# Patient Record
Sex: FEMALE | Race: WHITE | URBAN - METROPOLITAN AREA
[De-identification: names, ages, dates, MRNs, and addresses within clinical notes are randomized per-mention and may not be internally consistent; named-entity substitution may affect disease eponyms.]

---

## 2022-11-01 ENCOUNTER — OFFICE (OUTPATIENT)
Dept: URBAN - METROPOLITAN AREA CLINIC 109 | Facility: CLINIC | Age: 82
Setting detail: OPHTHALMOLOGY
End: 2022-11-01
Payer: MEDICARE

## 2022-11-01 DIAGNOSIS — H40.1132: ICD-10-CM

## 2022-11-01 PROCEDURE — 92133 CPTRZD OPH DX IMG PST SGM ON: CPT | Performed by: OPHTHALMOLOGY

## 2022-11-01 PROCEDURE — 92020 GONIOSCOPY: CPT | Performed by: OPHTHALMOLOGY

## 2022-11-01 PROCEDURE — 92083 EXTENDED VISUAL FIELD XM: CPT | Performed by: OPHTHALMOLOGY

## 2022-11-01 PROCEDURE — 99213 OFFICE O/P EST LOW 20 MIN: CPT | Performed by: OPHTHALMOLOGY

## 2022-11-01 ASSESSMENT — REFRACTION_AUTOREFRACTION
OS_CYLINDER: -1.25
OS_SPHERE: +0.50
OD_CYLINDER: -1.50
OD_SPHERE: +0.75
OS_AXIS: 70
OD_AXIS: 105

## 2022-11-01 ASSESSMENT — TONOMETRY
OS_IOP_MMHG: 15
OD_IOP_MMHG: 15

## 2022-11-01 ASSESSMENT — SUPERFICIAL PUNCTATE KERATITIS (SPK)
OS_SPK: 2+
OD_SPK: 2+

## 2022-11-01 ASSESSMENT — SPHEQUIV_DERIVED
OD_SPHEQUIV: 0
OS_SPHEQUIV: -0.125

## 2022-11-01 ASSESSMENT — VISUAL ACUITY
OD_BCVA: 20/20-2
OS_BCVA: 20/20-2

## 2022-11-01 ASSESSMENT — CONFRONTATIONAL VISUAL FIELD TEST (CVF)
OD_FINDINGS: FULL
OS_FINDINGS: FULL

## 2023-05-10 ENCOUNTER — OFFICE (OUTPATIENT)
Dept: URBAN - METROPOLITAN AREA CLINIC 109 | Facility: CLINIC | Age: 83
Setting detail: OPHTHALMOLOGY
End: 2023-05-10
Payer: MEDICARE

## 2023-05-10 DIAGNOSIS — H40.1132: ICD-10-CM

## 2023-05-10 DIAGNOSIS — H43.393: ICD-10-CM

## 2023-05-10 PROCEDURE — 92083 EXTENDED VISUAL FIELD XM: CPT | Performed by: OPHTHALMOLOGY

## 2023-05-10 PROCEDURE — 92014 COMPRE OPH EXAM EST PT 1/>: CPT | Performed by: OPHTHALMOLOGY

## 2023-05-10 PROCEDURE — 92250 FUNDUS PHOTOGRAPHY W/I&R: CPT | Performed by: OPHTHALMOLOGY

## 2023-05-10 ASSESSMENT — REFRACTION_AUTOREFRACTION
OD_SPHERE: +0.75
OS_SPHERE: +0.50
OD_CYLINDER: -1.50
OS_AXIS: 70
OD_AXIS: 105
OS_CYLINDER: -1.25

## 2023-05-10 ASSESSMENT — SUPERFICIAL PUNCTATE KERATITIS (SPK)
OS_SPK: 2+
OD_SPK: 2+

## 2023-05-10 ASSESSMENT — CONFRONTATIONAL VISUAL FIELD TEST (CVF)
OD_FINDINGS: FULL
OS_FINDINGS: FULL

## 2023-05-10 ASSESSMENT — SPHEQUIV_DERIVED
OS_SPHEQUIV: -0.125
OD_SPHEQUIV: 0

## 2023-05-10 ASSESSMENT — VISUAL ACUITY
OD_BCVA: 20/20-2
OS_BCVA: 20/20-2

## 2023-11-14 ENCOUNTER — OFFICE (OUTPATIENT)
Dept: URBAN - METROPOLITAN AREA CLINIC 109 | Facility: CLINIC | Age: 83
Setting detail: OPHTHALMOLOGY
End: 2023-11-14
Payer: MEDICARE

## 2023-11-14 DIAGNOSIS — H40.1132: ICD-10-CM

## 2023-11-14 PROCEDURE — 92012 INTRM OPH EXAM EST PATIENT: CPT | Performed by: OPHTHALMOLOGY

## 2023-11-14 PROCEDURE — 92083 EXTENDED VISUAL FIELD XM: CPT | Performed by: OPHTHALMOLOGY

## 2023-11-14 PROCEDURE — 92133 CPTRZD OPH DX IMG PST SGM ON: CPT | Performed by: OPHTHALMOLOGY

## 2023-11-14 PROCEDURE — 92020 GONIOSCOPY: CPT | Performed by: OPHTHALMOLOGY

## 2023-11-14 ASSESSMENT — REFRACTION_AUTOREFRACTION
OD_CYLINDER: -1.50
OD_SPHERE: +0.75
OS_CYLINDER: -1.25
OD_AXIS: 105
OS_SPHERE: +0.50
OS_AXIS: 70

## 2023-11-14 ASSESSMENT — SPHEQUIV_DERIVED
OD_SPHEQUIV: 0
OS_SPHEQUIV: -0.125

## 2023-11-14 ASSESSMENT — CONFRONTATIONAL VISUAL FIELD TEST (CVF)
OD_FINDINGS: FULL
OS_FINDINGS: FULL

## 2023-11-14 ASSESSMENT — SUPERFICIAL PUNCTATE KERATITIS (SPK)
OS_SPK: 2+
OD_SPK: 2+

## 2024-03-21 ENCOUNTER — INPATIENT (INPATIENT)
Facility: HOSPITAL | Age: 84
LOS: 1 days | Discharge: HOME | DRG: 305 | End: 2024-03-23
Attending: INTERNAL MEDICINE | Admitting: INTERNAL MEDICINE
Payer: MEDICARE

## 2024-03-21 VITALS
WEIGHT: 134.92 LBS | DIASTOLIC BLOOD PRESSURE: 102 MMHG | RESPIRATION RATE: 18 BRPM | HEIGHT: 65 IN | TEMPERATURE: 98 F | SYSTOLIC BLOOD PRESSURE: 208 MMHG | HEART RATE: 86 BPM | OXYGEN SATURATION: 96 %

## 2024-03-21 DIAGNOSIS — I10 ESSENTIAL (PRIMARY) HYPERTENSION: ICD-10-CM

## 2024-03-21 LAB
ALBUMIN SERPL ELPH-MCNC: 4 G/DL — SIGNIFICANT CHANGE UP (ref 3.3–5)
ALP SERPL-CCNC: 93 U/L — SIGNIFICANT CHANGE UP (ref 40–120)
ALT FLD-CCNC: 54 U/L — SIGNIFICANT CHANGE UP (ref 12–78)
ANION GAP SERPL CALC-SCNC: 9 MMOL/L — SIGNIFICANT CHANGE UP (ref 5–17)
AST SERPL-CCNC: 30 U/L — SIGNIFICANT CHANGE UP (ref 15–37)
BASOPHILS # BLD AUTO: 0.07 K/UL — SIGNIFICANT CHANGE UP (ref 0–0.2)
BASOPHILS NFR BLD AUTO: 0.8 % — SIGNIFICANT CHANGE UP (ref 0–2)
BILIRUB SERPL-MCNC: 1.4 MG/DL — HIGH (ref 0.2–1.2)
BUN SERPL-MCNC: 11 MG/DL — SIGNIFICANT CHANGE UP (ref 7–23)
CALCIUM SERPL-MCNC: 9 MG/DL — SIGNIFICANT CHANGE UP (ref 8.5–10.1)
CHLORIDE SERPL-SCNC: 108 MMOL/L — SIGNIFICANT CHANGE UP (ref 96–108)
CO2 SERPL-SCNC: 26 MMOL/L — SIGNIFICANT CHANGE UP (ref 22–31)
CREAT SERPL-MCNC: 0.75 MG/DL — SIGNIFICANT CHANGE UP (ref 0.5–1.3)
EGFR: 78 ML/MIN/1.73M2 — SIGNIFICANT CHANGE UP
EOSINOPHIL # BLD AUTO: 0.12 K/UL — SIGNIFICANT CHANGE UP (ref 0–0.5)
EOSINOPHIL NFR BLD AUTO: 1.3 % — SIGNIFICANT CHANGE UP (ref 0–6)
GLUCOSE SERPL-MCNC: 95 MG/DL — SIGNIFICANT CHANGE UP (ref 70–99)
HCT VFR BLD CALC: 37.2 % — SIGNIFICANT CHANGE UP (ref 34.5–45)
HGB BLD-MCNC: 12.1 G/DL — SIGNIFICANT CHANGE UP (ref 11.5–15.5)
IMM GRANULOCYTES NFR BLD AUTO: 0.4 % — SIGNIFICANT CHANGE UP (ref 0–0.9)
LYMPHOCYTES # BLD AUTO: 1.7 K/UL — SIGNIFICANT CHANGE UP (ref 1–3.3)
LYMPHOCYTES # BLD AUTO: 18.7 % — SIGNIFICANT CHANGE UP (ref 13–44)
MAGNESIUM SERPL-MCNC: 2 MG/DL — SIGNIFICANT CHANGE UP (ref 1.6–2.6)
MCHC RBC-ENTMCNC: 30 PG — SIGNIFICANT CHANGE UP (ref 27–34)
MCHC RBC-ENTMCNC: 32.5 GM/DL — SIGNIFICANT CHANGE UP (ref 32–36)
MCV RBC AUTO: 92.3 FL — SIGNIFICANT CHANGE UP (ref 80–100)
MONOCYTES # BLD AUTO: 0.92 K/UL — HIGH (ref 0–0.9)
MONOCYTES NFR BLD AUTO: 10.1 % — SIGNIFICANT CHANGE UP (ref 2–14)
NEUTROPHILS # BLD AUTO: 6.23 K/UL — SIGNIFICANT CHANGE UP (ref 1.8–7.4)
NEUTROPHILS NFR BLD AUTO: 68.7 % — SIGNIFICANT CHANGE UP (ref 43–77)
NRBC # BLD: 0 /100 WBCS — SIGNIFICANT CHANGE UP (ref 0–0)
PLATELET # BLD AUTO: 320 K/UL — SIGNIFICANT CHANGE UP (ref 150–400)
POTASSIUM SERPL-MCNC: 3.5 MMOL/L — SIGNIFICANT CHANGE UP (ref 3.5–5.3)
POTASSIUM SERPL-SCNC: 3.5 MMOL/L — SIGNIFICANT CHANGE UP (ref 3.5–5.3)
PROT SERPL-MCNC: 7.5 G/DL — SIGNIFICANT CHANGE UP (ref 6–8.3)
RBC # BLD: 4.03 M/UL — SIGNIFICANT CHANGE UP (ref 3.8–5.2)
RBC # FLD: 14.2 % — SIGNIFICANT CHANGE UP (ref 10.3–14.5)
SODIUM SERPL-SCNC: 143 MMOL/L — SIGNIFICANT CHANGE UP (ref 135–145)
TROPONIN I, HIGH SENSITIVITY RESULT: 6.6 NG/L — SIGNIFICANT CHANGE UP
TSH SERPL-MCNC: 0.69 UIU/ML — SIGNIFICANT CHANGE UP (ref 0.36–3.74)
WBC # BLD: 9.08 K/UL — SIGNIFICANT CHANGE UP (ref 3.8–10.5)
WBC # FLD AUTO: 9.08 K/UL — SIGNIFICANT CHANGE UP (ref 3.8–10.5)

## 2024-03-21 PROCEDURE — 93010 ELECTROCARDIOGRAM REPORT: CPT

## 2024-03-21 PROCEDURE — 71045 X-RAY EXAM CHEST 1 VIEW: CPT | Mod: 26

## 2024-03-21 PROCEDURE — 99285 EMERGENCY DEPT VISIT HI MDM: CPT

## 2024-03-21 RX ORDER — OLMESARTAN MEDOXOMIL 5 MG/1
1 TABLET, FILM COATED ORAL
Qty: 30 | Refills: 0
Start: 2024-03-21 | End: 2024-04-19

## 2024-03-21 RX ORDER — METOPROLOL TARTRATE 50 MG
5 TABLET ORAL EVERY 6 HOURS
Refills: 0 | Status: DISCONTINUED | OUTPATIENT
Start: 2024-03-21 | End: 2024-03-22

## 2024-03-21 RX ORDER — HYDRALAZINE HCL 50 MG
10 TABLET ORAL ONCE
Refills: 0 | Status: COMPLETED | OUTPATIENT
Start: 2024-03-21 | End: 2024-03-21

## 2024-03-21 RX ORDER — ACETAMINOPHEN 500 MG
650 TABLET ORAL EVERY 6 HOURS
Refills: 0 | Status: DISCONTINUED | OUTPATIENT
Start: 2024-03-21 | End: 2024-03-23

## 2024-03-21 RX ORDER — METOPROLOL TARTRATE 50 MG
25 TABLET ORAL
Refills: 0 | Status: DISCONTINUED | OUTPATIENT
Start: 2024-03-21 | End: 2024-03-22

## 2024-03-21 RX ORDER — SODIUM CHLORIDE 9 MG/ML
1000 INJECTION INTRAMUSCULAR; INTRAVENOUS; SUBCUTANEOUS ONCE
Refills: 0 | Status: COMPLETED | OUTPATIENT
Start: 2024-03-21 | End: 2024-03-21

## 2024-03-21 RX ORDER — LOSARTAN POTASSIUM 100 MG/1
25 TABLET, FILM COATED ORAL DAILY
Refills: 0 | Status: DISCONTINUED | OUTPATIENT
Start: 2024-03-21 | End: 2024-03-23

## 2024-03-21 RX ORDER — METOPROLOL TARTRATE 50 MG
5 TABLET ORAL ONCE
Refills: 0 | Status: COMPLETED | OUTPATIENT
Start: 2024-03-21 | End: 2024-03-21

## 2024-03-21 RX ADMIN — SODIUM CHLORIDE 1000 MILLILITER(S): 9 INJECTION INTRAMUSCULAR; INTRAVENOUS; SUBCUTANEOUS at 20:11

## 2024-03-21 RX ADMIN — Medication 5 MILLIGRAM(S): at 19:55

## 2024-03-21 RX ADMIN — SODIUM CHLORIDE 1000 MILLILITER(S): 9 INJECTION INTRAMUSCULAR; INTRAVENOUS; SUBCUTANEOUS at 21:11

## 2024-03-21 RX ADMIN — Medication 10 MILLIGRAM(S): at 17:59

## 2024-03-21 NOTE — H&P ADULT - ASSESSMENT
84yo F ho htn, hld, hypothyroid, presents with elevated BP. pt has not had her coreg in a week, she ran out. also lost her  in january and had been taking care of him and was unable to care of herself. was seeing her pcp today and noted to have elevated BP so was sent to cardiologist. saw dr miranda and was found ot have /105. pt was given a 5 mg amlodipine and shanon dto go to ED for more aggressive management of her BP. she denies headache, chest pain, sob, vision changes leg swelling or other complaints. pt just feeling anxious  82yo F ho htn, hld, hypothyroid, presents with elevated BP. pt has not had her coreg in a week, she ran out. also lost her  in january and had been taking care of him and was unable to care of herself. was seeing her pcp today and noted to have elevated BP so was sent to cardiologist. saw dr miranda and was found ot have /105. pt was given a 5 mg amlodipine and shanon dto go to ED for more aggressive management of her BP. she denies headache, chest pain, sob, vision changes leg swelling or other complaints. pt just feeling anxious    1 HTN urgency  - resolved  - cw ACE  - cw bb  - cards fu   - DASH diet    2 Afib with RVR  - cw metoprolol  - cards fu     3 HLD  - cw statin    DVT prophylaxis

## 2024-03-21 NOTE — PATIENT PROFILE ADULT - FALL HARM RISK - HARM RISK INTERVENTIONS

## 2024-03-21 NOTE — ED PROVIDER NOTE - CLINICAL SUMMARY MEDICAL DECISION MAKING FREE TEXT BOX
84yo F ho htn, hld, hypothyroid, presents with elevated BP. pt has not had her coreg in a week, she ran out. also lost her  in january and had been taking care of him and was unable to care of herself. was seeing her pcp today and noted to have elevated BP so was sent to cardiologist. saw dr miranda and was found ot have /105. pt was given a 5 mg amlodipine and shanon dto go to ED for more aggressive management of her BP. she denies headache, chest pain, sob, vision changes leg swelling or other complaints. pt just feeling anxious   asymptomatic htn, I nsetting of missing coreg for 1 week, sent in by crds for iv antihypertensive and then add amlodipine and switch ace>arb   will check basic labs, reassess after hydralazine

## 2024-03-21 NOTE — ED PROVIDER NOTE - PROGRESS NOTE DETAILS
BP now 130/71, will dc with olmesartan 20mg QD, already got script for coreg and amlodipine and will fu with dr miranda  pt reassessed and found to be in rapid afib, will treat and keep for am cards marieal given iv metoprolol with response for afib, luz maria hinton

## 2024-03-21 NOTE — H&P ADULT - NSHPLABSRESULTS_GEN_ALL_CORE
Lab Results:  CBC  CBC Full  -  ( 21 Mar 2024 17:55 )  WBC Count : 9.08 K/uL  RBC Count : 4.03 M/uL  Hemoglobin : 12.1 g/dL  Hematocrit : 37.2 %  Platelet Count - Automated : 320 K/uL  Mean Cell Volume : 92.3 fl  Mean Cell Hemoglobin : 30.0 pg  Mean Cell Hemoglobin Concentration : 32.5 gm/dL  Auto Neutrophil # : 6.23 K/uL  Auto Lymphocyte # : 1.70 K/uL  Auto Monocyte # : 0.92 K/uL  Auto Eosinophil # : 0.12 K/uL  Auto Basophil # : 0.07 K/uL  Auto Neutrophil % : 68.7 %  Auto Lymphocyte % : 18.7 %  Auto Monocyte % : 10.1 %  Auto Eosinophil % : 1.3 %  Auto Basophil % : 0.8 %    .		Differential:	[] Automated		[] Manual  Chemistry                        12.1   9.08  )-----------( 320      ( 21 Mar 2024 17:55 )             37.2     03-21    143  |  108  |  11  ----------------------------<  95  3.5   |  26  |  0.75    Ca    9.0      21 Mar 2024 17:55  Mg     2.0     03-21    TPro  7.5  /  Alb  4.0  /  TBili  1.4<H>  /  DBili  x   /  AST  30  /  ALT  54  /  AlkPhos  93  03-21    LIVER FUNCTIONS - ( 21 Mar 2024 17:55 )  Alb: 4.0 g/dL / Pro: 7.5 g/dL / ALK PHOS: 93 U/L / ALT: 54 U/L / AST: 30 U/L / GGT: x             Urinalysis Basic - ( 21 Mar 2024 17:55 )    Color: x / Appearance: x / SG: x / pH: x  Gluc: 95 mg/dL / Ketone: x  / Bili: x / Urobili: x   Blood: x / Protein: x / Nitrite: x   Leuk Esterase: x / RBC: x / WBC x   Sq Epi: x / Non Sq Epi: x / Bacteria: x            MICROBIOLOGY/CULTURES:      RADIOLOGY RESULTS: reviewed

## 2024-03-21 NOTE — ED ADULT NURSE NOTE - OBJECTIVE STATEMENT
Pt presents to the ED, sent from her MD's office s/p elevated BP. EKG done, pt placed on cardiac monitor.

## 2024-03-21 NOTE — H&P ADULT - HISTORY OF PRESENT ILLNESS
84yo F ho htn, hld, hypothyroid, presents with elevated BP. pt has not had her coreg in a week, she ran out. also lost her  in january and had been taking care of him and was unable to care of herself. was seeing her pcp today and noted to have elevated BP so was sent to cardiologist. saw dr miranda and was found ot have /105. pt was given a 5 mg amlodipine and shanon dto go to ED for more aggressive management of her BP. she denies headache, chest pain, sob, vision changes leg swelling or other complaints. pt just feeling anxious

## 2024-03-21 NOTE — H&P ADULT - NSHPPHYSICALEXAM_GEN_ALL_CORE
General: WN/WD NAD  PERRLA  Neurology: A&Ox3, nonfocal, CRANDALL x 4  Respiratory: CTA B/L  CV: RRR, S1S2, no murmurs, rubs or gallops  Abdominal: Soft, NT, ND +BS, Last BM  Extremities: No edema, + peripheral pulses  Skin Normal

## 2024-03-21 NOTE — ED ADULT TRIAGE NOTE - CHIEF COMPLAINT QUOTE
patient ambulatory to triage a&ox4 with c/o high BP sent from cardiologist. denies chest pain/headaches/changes in vision. takes lisinopril and amlodipine took her medications today

## 2024-03-21 NOTE — H&P ADULT - NSICDXPASTMEDICALHX_GEN_ALL_CORE_FT
"Oncology Rooming Note    April 27, 2022 12:08 PM   Josefina Bennett is a 54 year old female who presents for:    Chief Complaint   Patient presents with     Blood Draw     Labs drawn with  by rn.  VS taken.     Oncology Clinic Visit     Rtn Breast CA; Follow Up     Initial Vitals: BP (!) 153/85 (BP Location: Left arm, Patient Position: Sitting, Cuff Size: Adult Regular)   Pulse 102   Temp 98.2  F (36.8  C) (Oral)   Resp 16   Wt 61.2 kg (135 lb)   LMP 12/18/2014   SpO2 99%   BMI 23.17 kg/m   Estimated body mass index is 23.17 kg/m  as calculated from the following:    Height as of 4/7/22: 1.626 m (5' 4\").    Weight as of this encounter: 61.2 kg (135 lb). Body surface area is 1.66 meters squared.  Moderate Pain (5) Comment: Data Unavailable   Patient's last menstrual period was 12/18/2014.  Allergies reviewed: Yes  Medications reviewed: Yes    Medications: Medication refills not needed today.  Pharmacy name entered into Spo[facility]:    [facility] - [place], [place] - [address] [id number] - [place], [place] - [address]  [facility] - [place], [place] - [address] [facility] - [place], [place] - [address]    Clinical concerns: Pt has no concerns for their provider on April 27, 2022 and would like to discuss the original chief complaint of the appointment.     Kallie Colon, EMT on April 27, 2022 at 12:08 PM            " PAST MEDICAL HISTORY:  HLD (hyperlipidemia)     HTN (hypertension)

## 2024-03-22 LAB
BASE EXCESS BLDV CALC-SCNC: -0.2 MMOL/L — SIGNIFICANT CHANGE UP (ref -2–3)
HCO3 BLDV-SCNC: 25 MMOL/L — SIGNIFICANT CHANGE UP (ref 22–29)
PCO2 BLDV: 41 MMHG — SIGNIFICANT CHANGE UP (ref 39–42)
PH BLDV: 7.39 — SIGNIFICANT CHANGE UP (ref 7.32–7.43)
PO2 BLDV: 104 MMHG — HIGH (ref 25–45)
SAO2 % BLDV: 98.7 % — HIGH (ref 67–88)
T3 SERPL-MCNC: 86 NG/DL — SIGNIFICANT CHANGE UP (ref 80–200)
T4 AB SER-ACNC: 9 UG/DL — SIGNIFICANT CHANGE UP (ref 4.6–12)
TSH SERPL-MCNC: 0.8 UIU/ML — SIGNIFICANT CHANGE UP (ref 0.36–3.74)

## 2024-03-22 PROCEDURE — 93010 ELECTROCARDIOGRAM REPORT: CPT

## 2024-03-22 RX ORDER — LANOLIN ALCOHOL/MO/W.PET/CERES
5 CREAM (GRAM) TOPICAL ONCE
Refills: 0 | Status: COMPLETED | OUTPATIENT
Start: 2024-03-22 | End: 2024-03-22

## 2024-03-22 RX ORDER — ENOXAPARIN SODIUM 100 MG/ML
40 INJECTION SUBCUTANEOUS EVERY 24 HOURS
Refills: 0 | Status: DISCONTINUED | OUTPATIENT
Start: 2024-03-22 | End: 2024-03-23

## 2024-03-22 RX ORDER — LEVOTHYROXINE SODIUM 125 MCG
75 TABLET ORAL DAILY
Refills: 0 | Status: DISCONTINUED | OUTPATIENT
Start: 2024-03-22 | End: 2024-03-23

## 2024-03-22 RX ORDER — ATORVASTATIN CALCIUM 80 MG/1
10 TABLET, FILM COATED ORAL AT BEDTIME
Refills: 0 | Status: DISCONTINUED | OUTPATIENT
Start: 2024-03-22 | End: 2024-03-23

## 2024-03-22 RX ORDER — METOPROLOL TARTRATE 50 MG
50 TABLET ORAL
Refills: 0 | Status: DISCONTINUED | OUTPATIENT
Start: 2024-03-22 | End: 2024-03-23

## 2024-03-22 RX ADMIN — ATORVASTATIN CALCIUM 10 MILLIGRAM(S): 80 TABLET, FILM COATED ORAL at 21:16

## 2024-03-22 RX ADMIN — Medication 25 MILLIGRAM(S): at 05:56

## 2024-03-22 RX ADMIN — ENOXAPARIN SODIUM 40 MILLIGRAM(S): 100 INJECTION SUBCUTANEOUS at 13:56

## 2024-03-22 RX ADMIN — Medication 50 MILLIGRAM(S): at 13:56

## 2024-03-22 RX ADMIN — LOSARTAN POTASSIUM 25 MILLIGRAM(S): 100 TABLET, FILM COATED ORAL at 05:56

## 2024-03-22 NOTE — DIETITIAN INITIAL EVALUATION ADULT - ETIOLOGY
related to social circumstances in the setting of caring for spouse/reduced self care related to not taking BP med, higher than recommended intake of Na

## 2024-03-22 NOTE — DIETITIAN INITIAL EVALUATION ADULT - ORAL INTAKE PTA/DIET HISTORY
Pt reports tries to follow low salt diet at home- doesn't use table salt much, not much in cooking and tries to read labels.  Diet hx reveals Na intake higher than pt thinks as she eats regular bologna, canned soup, Rice-a-Fabien side dish, etc.  Eats out for dinner about 1x/wk with neice. Eats 3 meals a day at home.  Reports wt of 160# 3-4 years ago.  +gradual wt loss due to caring for ill spouse (passed in January) and going up and down split level home stairs multiple times a day to care for him.

## 2024-03-22 NOTE — SOCIAL WORK PROGRESS NOTE - NSSWPROGRESSNOTE_GEN_ALL_CORE
SW consult for positive depression screen marked complete. CHARLEY met with pt. bedside to discuss feelings of depressin. Pt. explained that she has had significant loss since losing her daughter in 2020 to covid19, her son recently due to strokes and  in january. SW and pt. discussed her previous experience in therapy and that she is managing her losses with the support of family and friends support. SW provided support and will remain available as needed.

## 2024-03-22 NOTE — CONSULT NOTE ADULT - SUBJECTIVE AND OBJECTIVE BOX
[facility]    CHIEF COMPLAINT: Patient is a 83y old  Female who presents with a chief complaint of High blood pressure (22 Mar 2024 09:45)      HPI:   84yo F ho htn, hld, hypothyroid, presents with elevated BP. pt has not had her coreg in a week, she ran out. also lost her  in january and had been taking care of him and was unable to care of herself. was seeing her pcp today and noted to have elevated BP so was sent to cardiologist. saw dr galicia and was found ot have /105. pt was given a 5 mg amlodipine and shanon dto go to ED for more aggressive management of her BP. she denies headache, chest pain, sob, vision changes leg swelling or other complaints. pt just feeling anxious (21 Mar 2024 20:45)    PAST MEDICAL & SURGICAL HISTORY:  HTN (hypertension)      HLD (hyperlipidemia)        SOCIAL HISTORY: no tobacco  FAMILY HISTORY:   HTN  MEDICATIONS  (STANDING):  atorvastatin 10 milliGRAM(s) Oral at bedtime  enoxaparin Injectable 40 milliGRAM(s) SubCutaneous every 24 hours  levothyroxine 75 MICROGram(s) Oral daily  losartan 25 milliGRAM(s) Oral daily  metoprolol tartrate 25 milliGRAM(s) Oral two times a day    MEDICATIONS  (PRN):  acetaminophen     Tablet .. 650 milliGRAM(s) Oral every 6 hours PRN Temp greater or equal to 38C (100.4F), Mild Pain (1 - 3)  metoprolol tartrate Injectable 5 milliGRAM(s) IV Push every 6 hours PRN for HR more than 100    Allergies    No Known Allergies    Intolerances        REVIEW OF SYSTEMS:  CONSTITUTIONAL: No weakness, no fevers   EYES/ENT: No visual changes  NECK: No pain or stiffness  RESPIRATORY: No shortness of breath  CARDIOVASCULAR: No chest pain or palpitations  GASTROINTESTINAL: No abdominal pain  GENITOURINARY: No hematuria  NEUROLOGICAL: No weakness  SKIN: No rash  All other review of systems is negative unless indicated above    VITAL SIGNS:   Vital Signs Last 24 Hrs  T(C): 37.1 (22 Mar 2024 04:45), Max: 37.3 (21 Mar 2024 23:16)  T(F): 98.7 (22 Mar 2024 04:45), Max: 99.2 (21 Mar 2024 23:16)  HR: 80 (22 Mar 2024 04:45) (68 - 116)  BP: 158/73 (22 Mar 2024 04:45) (130/71 - 213/97)  BP(mean): --  RR: 18 (22 Mar 2024 04:45) (16 - 18)  SpO2: 93% (22 Mar 2024 04:45) (93% - 98%)    Parameters below as of 22 Mar 2024 04:45  Patient On (Oxygen Delivery Method): room air      I&O's Summary    PHYSICAL EXAM:  Constitutional: NAD  Neurological: Alert and oriented  HEENT: EOMI, no JVD  Cardiovascular: S1 and S2, no murmur  Pulmonary: breath sounds bilaterally  Gastrointestinal: Bowel Sounds present, soft, nontender  Ext: no peripheral edema  Skin: No rashes, No cyanosis.  Psych:  Mood calm    LABS: All Labs Reviewed:                        12.1   9.08  )-----------( 320      ( 21 Mar 2024 17:55 )             37.2     03-21    143  |  108  |  11  ----------------------------<  95  3.5   |  26  |  0.75    Ca    9.0      21 Mar 2024 17:55  Mg     2.0     03-21    TPro  7.5  /  Alb  4.0  /  TBili  1.4<H>  /  DBili  x   /  AST  30  /  ALT  54  /  AlkPhos  93  03-21        HPI:   84yo F ho htn, hld, hypothyroid, presents with elevated BP. pt has not had her coreg in a week, she ran out. also lost her  in january and had been taking care of him and was unable to care of herself. was seeing her pcp today and noted to have elevated BP so was sent to cardiologist. saw dr galicia and was found ot have /105. pt was given a 5 mg amlodipine and shanon dto go to ED for more aggressive management of her BP. she denies headache, chest pain, sob, vision changes leg swelling or other complaints. pt just feeling anxious (21 Mar 2024 20:45)    pt denies CP or SOB  12 lead ekg and ECHO ordered  would inc losartan to 50 mg  will follow here  outpt f/u with Dr GALICIA [phone]          
Date/Time Patient Seen:  		  Referring MD:   Data Reviewed	       Patient is a 83y old  Female who presents with a chief complaint of High blood pressure (21 Mar 2024 20:45)      Subjective/HPI   82yo F ho htn, hld, hypothyroid, presents with elevated BP. pt has not had her coreg in a week, she ran out. also lost her  in january and had been taking care of him and was unable to care of herself. was seeing her pcp today and noted to have elevated BP so was sent to cardiologist. saw dr miranda and was found ot have /105.  PAST MEDICAL & SURGICAL HISTORY:  HTN (hypertension)    HLD (hyperlipidemia)    PAST MEDICAL HISTORY:  HLD (hyperlipidemia)     HTN (hypertension).     Social History:  · Substance use	No   · Social History (marital status, living situation, occupation, and sexual history)	neg     Tobacco Screening:  · Core Measure Site	Yes  · Has the patient used tobacco in the past 30 days?	No     Risk Assessment:    Present on Admission:  Deep Venous Thrombosis	no   Pulmonary Embolus	no      HIV Screening:  · In accordance with [place] State law, we offer every patient who comes to our ED an HIV test. Would you like to be tested today?	Opt out         Medication list         MEDICATIONS  (STANDING):  atorvastatin 10 milliGRAM(s) Oral at bedtime  enoxaparin Injectable 40 milliGRAM(s) SubCutaneous every 24 hours  levothyroxine 75 MICROGram(s) Oral daily  losartan 25 milliGRAM(s) Oral daily  metoprolol tartrate 25 milliGRAM(s) Oral two times a day    MEDICATIONS  (PRN):  acetaminophen     Tablet .. 650 milliGRAM(s) Oral every 6 hours PRN Temp greater or equal to 38C (100.4F), Mild Pain (1 - 3)  metoprolol tartrate Injectable 5 milliGRAM(s) IV Push every 6 hours PRN for HR more than 100         Vitals log        ICU Vital Signs Last 24 Hrs  T(C): 37.1 (22 Mar 2024 04:45), Max: 37.3 (21 Mar 2024 23:16)  T(F): 98.7 (22 Mar 2024 04:45), Max: 99.2 (21 Mar 2024 23:16)  HR: 80 (22 Mar 2024 04:45) (68 - 116)  BP: 158/73 (22 Mar 2024 04:45) (130/71 - 213/97)  BP(mean): --  ABP: --  ABP(mean): --  RR: 18 (22 Mar 2024 04:45) (16 - 18)  SpO2: 93% (22 Mar 2024 04:45) (93% - 98%)    O2 Parameters below as of 22 Mar 2024 04:45  Patient On (Oxygen Delivery Method): room air                 Input and Output:  I&O's Detail      Lab Data                        12.1   9.08  )-----------( 320      ( 21 Mar 2024 17:55 )             37.2     03-21    143  |  108  |  11  ----------------------------<  95  3.5   |  26  |  0.75    Ca    9.0      21 Mar 2024 17:55  Mg     2.0     03-21    TPro  7.5  /  Alb  4.0  /  TBili  1.4<H>  /  DBili  x   /  AST  30  /  ALT  54  /  AlkPhos  93  03-21            Review of Systems	    depressed mood  flat affect    Objective     Physical Examination    heart s1s2  lung dc BS  head nc  verbal  alert  cn grossly int  kyphosis      Pertinent Lab findings & Imaging      Krishna:  NO   Adequate UO     I&O's Detail           Discussed with:     Cultures:	        Radiology  cxr noted  report pending

## 2024-03-22 NOTE — DIETITIAN INITIAL EVALUATION ADULT - PERTINENT MEDS FT
MEDICATIONS  (STANDING):  atorvastatin 10 milliGRAM(s) Oral at bedtime  enoxaparin Injectable 40 milliGRAM(s) SubCutaneous every 24 hours  levothyroxine 75 MICROGram(s) Oral daily  losartan 25 milliGRAM(s) Oral daily  metoprolol tartrate 25 milliGRAM(s) Oral two times a day    MEDICATIONS  (PRN):  acetaminophen     Tablet .. 650 milliGRAM(s) Oral every 6 hours PRN Temp greater or equal to 38C (100.4F), Mild Pain (1 - 3)  metoprolol tartrate Injectable 5 milliGRAM(s) IV Push every 6 hours PRN for HR more than 100

## 2024-03-22 NOTE — DIETITIAN INITIAL EVALUATION ADULT - OTHER INFO
"84yo F ho htn, hld, hypothyroid, presents with elevated BP" admitted for hypertensive urgency, Afib.

## 2024-03-22 NOTE — PROGRESS NOTE ADULT - ASSESSMENT
82yo F ho htn, hld, hypothyroid, presents with elevated BP. pt has not had her coreg in a week, she ran out. also lost her  in january and had been taking care of him and was unable to care of herself. was seeing her pcp today and noted to have elevated BP so was sent to cardiologist. saw dr miranda and was found ot have /105. pt was given a 5 mg amlodipine and shanon dto go to ED for more aggressive management of her BP. she denies headache, chest pain, sob, vision changes leg swelling or other complaints. pt just feeling anxious    1 HTN urgency  - resolved  - cw ACE  - cw bb  - cards fu   - DASH diet    2 Afib with RVR  - unclear as per cards  - telemonitor x 24 hrs   - cw metoprolol  - cards fu     3 HLD  - cw statin    DVT prophylaxis

## 2024-03-22 NOTE — CARE COORDINATION ASSESSMENT. - NSCAREPROVIDERS_GEN_ALL_CORE_FT
CARE PROVIDERS:  Accepting Physician: Liliane Ewing  Administration: Issac Jones  Administration: Beatris Allen  Administration: Cesilia England  Admitting: Liliane Ewing  Attending: Liliane Ewing  Clinical Doc. Improvement: Navneet Kennedy  Consultant: Klever Burnham  Consultant: Corby Seo  ED Attending: Deanna Don  ED Nurse: Marilyn Chau  ED Nurse 2: Justin Cast  Nurse: Flor Mason  Nurse: Beatris Aguilar  Nurse: Tirso Ledezma  Nurse: Lizbet Alonzo  Ordered: ADM, User  Override: Rubia Hines  Override: Norma Kim  Override: Tirso Ledezma  PCA/Nursing Assistant: Nik Ordonez  Registered Dietitian: Sharon Alicia  Registered Dietitian: Manjula Waldron  : Clotilde Chavarria

## 2024-03-22 NOTE — PROVIDER CONTACT NOTE (OTHER) - SITUATION
Informed provider pt /67 HR 74 93%RA 99.2 T
Informed provider pt /75 and HR 76. Informed provider pt complained of mild headache

## 2024-03-22 NOTE — CARE COORDINATION ASSESSMENT. - OTHER PERTINENT DISCHARGE PLANNING INFORMATION:
CM met with the patient at the bedside, educated pt on role of CM and transition planning. Patient verbalized understanding. CM provided direct contact/resource folder and remains available. Pt resides in a house alone, has 3 steps to enter, 7 to upstairs, 7 to basement. Patient has 2 walkers, 3 wheelchairs, but does not use. Per Pt she is independent with ADL's, ambulating and negotiating steps and still drives. Denies any prior home care services. PMD Dr Sangeeta Swain, pharmacy [facility].

## 2024-03-22 NOTE — DIETITIAN INITIAL EVALUATION ADULT - PERTINENT LABORATORY DATA
03-21    143  |  108  |  11  ----------------------------<  95  3.5   |  26  |  0.75    Ca    9.0      21 Mar 2024 17:55  Mg     2.0     03-21    TPro  7.5  /  Alb  4.0  /  TBili  1.4<H>  /  DBili  x   /  AST  30  /  ALT  54  /  AlkPhos  93  03-21

## 2024-03-22 NOTE — CONSULT NOTE ADULT - ASSESSMENT
84yo F ho htn, hld, hypothyroid, presents with elevated BP. pt has not had her coreg in a week, she ran out. also lost her  in january and had been taking care of him and was unable to care of herself. was seeing her pcp today and noted to have elevated BP so was sent to cardiologist. saw dr miranda and was found ot have /105.    HTN  HLD  Kyphosis  Grieving  Hypothyroidism    cardio eval  cvs rx regimen optimization and BP control  dietary discretion  kyphosis on exam - assessment for restrictive lung disease - non urgent PFT, will check VBG  monitor VS and HD and Sat  seasonal vaccination discussion  emotional support - grieving

## 2024-03-23 ENCOUNTER — TRANSCRIPTION ENCOUNTER (OUTPATIENT)
Age: 84
End: 2024-03-23

## 2024-03-23 ENCOUNTER — RESULT REVIEW (OUTPATIENT)
Age: 84
End: 2024-03-23

## 2024-03-23 VITALS
SYSTOLIC BLOOD PRESSURE: 158 MMHG | HEART RATE: 60 BPM | RESPIRATION RATE: 18 BRPM | OXYGEN SATURATION: 94 % | TEMPERATURE: 99 F | DIASTOLIC BLOOD PRESSURE: 82 MMHG

## 2024-03-23 LAB
CHOLEST SERPL-MCNC: 145 MG/DL — SIGNIFICANT CHANGE UP
HDLC SERPL-MCNC: 71 MG/DL — SIGNIFICANT CHANGE UP
LIDOCAIN IGE QN: 72 U/L — SIGNIFICANT CHANGE UP (ref 13–75)
LIPID PNL WITH DIRECT LDL SERPL: 64 MG/DL — SIGNIFICANT CHANGE UP
NON HDL CHOLESTEROL: 74 MG/DL — SIGNIFICANT CHANGE UP
TRIGL SERPL-MCNC: 41 MG/DL — SIGNIFICANT CHANGE UP

## 2024-03-23 PROCEDURE — 84480 ASSAY TRIIODOTHYRONINE (T3): CPT

## 2024-03-23 PROCEDURE — 85025 COMPLETE CBC W/AUTO DIFF WBC: CPT

## 2024-03-23 PROCEDURE — 80053 COMPREHEN METABOLIC PANEL: CPT

## 2024-03-23 PROCEDURE — 82803 BLOOD GASES ANY COMBINATION: CPT

## 2024-03-23 PROCEDURE — 96374 THER/PROPH/DIAG INJ IV PUSH: CPT

## 2024-03-23 PROCEDURE — 93306 TTE W/DOPPLER COMPLETE: CPT

## 2024-03-23 PROCEDURE — 83690 ASSAY OF LIPASE: CPT

## 2024-03-23 PROCEDURE — 84484 ASSAY OF TROPONIN QUANT: CPT

## 2024-03-23 PROCEDURE — 99285 EMERGENCY DEPT VISIT HI MDM: CPT

## 2024-03-23 PROCEDURE — 83735 ASSAY OF MAGNESIUM: CPT

## 2024-03-23 PROCEDURE — 36415 COLL VENOUS BLD VENIPUNCTURE: CPT

## 2024-03-23 PROCEDURE — 93005 ELECTROCARDIOGRAM TRACING: CPT

## 2024-03-23 PROCEDURE — 84436 ASSAY OF TOTAL THYROXINE: CPT

## 2024-03-23 PROCEDURE — 71045 X-RAY EXAM CHEST 1 VIEW: CPT

## 2024-03-23 PROCEDURE — 84443 ASSAY THYROID STIM HORMONE: CPT

## 2024-03-23 PROCEDURE — 80061 LIPID PANEL: CPT

## 2024-03-23 PROCEDURE — 96375 TX/PRO/DX INJ NEW DRUG ADDON: CPT

## 2024-03-23 RX ORDER — LISINOPRIL 2.5 MG/1
1 TABLET ORAL
Refills: 0 | DISCHARGE

## 2024-03-23 RX ORDER — LOSARTAN POTASSIUM 100 MG/1
1 TABLET, FILM COATED ORAL
Qty: 30 | Refills: 0
Start: 2024-03-23 | End: 2024-04-21

## 2024-03-23 RX ORDER — LOSARTAN POTASSIUM 100 MG/1
50 TABLET, FILM COATED ORAL DAILY
Refills: 0 | Status: DISCONTINUED | OUTPATIENT
Start: 2024-03-23 | End: 2024-03-23

## 2024-03-23 RX ADMIN — Medication 5 MILLIGRAM(S): at 01:33

## 2024-03-23 RX ADMIN — LOSARTAN POTASSIUM 25 MILLIGRAM(S): 100 TABLET, FILM COATED ORAL at 06:25

## 2024-03-23 RX ADMIN — Medication 75 MICROGRAM(S): at 06:25

## 2024-03-23 RX ADMIN — Medication 50 MILLIGRAM(S): at 06:25

## 2024-03-23 NOTE — DISCHARGE NOTE PROVIDER - HOSPITAL COURSE
84yo F ho htn, hld, hypothyroid, presents with elevated BP. pt has not had her coreg in a week, she ran out. also lost her  in january and had been taking care of him and was unable to care of herself. was seeing her pcp today and noted to have elevated BP so was sent to cardiologist. saw dr miranda and was found ot have /105. pt was given a 5 mg amlodipine and shanon dto go to ED for more aggressive management of her BP. she denies headache, chest pain, sob, vision changes leg swelling or other complaints. pt just feeling anxious    1 HTN urgency  - resolved  - increase losartan to  50   - cw bb  - cards fu   - DASH diet    2 Afib with RVR  - ruled out  - dw cards likley artifact, pt has been in normal sinus  - cw metoprolol  - cards fu     3 HLD  - cw statin    DVT prophylaxis

## 2024-03-23 NOTE — DISCHARGE NOTE PROVIDER - NSDCCPCAREPLAN_GEN_ALL_CORE_FT
PRINCIPAL DISCHARGE DIAGNOSIS  Diagnosis: Hypertensive urgency  Assessment and Plan of Treatment: increased losartan to 50 daily  resolved  outpt fu withh cards      SECONDARY DISCHARGE DIAGNOSES  Diagnosis: Atrial fibrillation  Assessment and Plan of Treatment: ruled out  dw cards, pt has been normal sinus

## 2024-03-23 NOTE — PROGRESS NOTE ADULT - ASSESSMENT
84yo F ho htn, hld, hypothyroid, presents with elevated BP. pt has not had her coreg in a week, she ran out. also lost her  in january and had been taking care of him and was unable to care of herself. was seeing her pcp today and noted to have elevated BP so was sent to cardiologist. saw dr galicia and was found ot have /105. pt was given a 5 mg amlodipine and shanon dto go to ED for more aggressive management of her BP. she denies headache, chest pain, sob, vision changes leg swelling or other complaints. pt just feeling anxious (21 Mar 2024 20:45)    pt denies CP or SOB  12 lead ekg and ECHO ordered  would inc losartan to 50 mg  will follow here  outpt f/u with Dr GALICIA [phone]  82yo F ho htn, hld, hypothyroid, presents with elevated BP. pt has not had her coreg in a week, she ran out. also lost her  in january and had been taking care of him and was unable to care of herself. was seeing her pcp today and noted to have elevated BP so was sent to cardiologist. saw dr galicia and was found ot have /105. pt was given a 5 mg amlodipine and shanon dto go to ED for more aggressive management of her BP. she denies headache, chest pain, sob, vision changes leg swelling or other complaints. pt just feeling anxious (21 Mar 2024 20:45)    Sinus rhythm on monitor without events overnight  12 lead ekg and ECHO ordered  would inc losartan to 50 mg  will follow here  outpt f/u with Dr GALICIA [phone]

## 2024-03-23 NOTE — DISCHARGE NOTE NURSING/CASE MANAGEMENT/SOCIAL WORK - PATIENT PORTAL LINK FT
You can access the FollowMyHealth Patient Portal offered by [facility] by registering at the following website: [email or web address]. By joining [facility]’s FollowMyHealth portal, you will also be able to view your health information using other applications (apps) compatible with our system.

## 2024-03-23 NOTE — PROGRESS NOTE ADULT - ASSESSMENT
84yo F ho htn, hld, hypothyroid, presents with elevated BP. pt has not had her coreg in a week, she ran out. also lost her  in january and had been taking care of him and was unable to care of herself. was seeing her pcp today and noted to have elevated BP so was sent to cardiologist. saw dr miranda and was found ot have /105.    HTN  HLD  Kyphosis  Grieving  Hypothyroidism    TFT and VBG noted  vs noted  cardio eval noted    cardio eval  cvs rx regimen optimization and BP control  dietary discretion  kyphosis on exam - assessment for restrictive lung disease - non urgent PFT,   monitor VS and HD and Sat  seasonal vaccination discussion  emotional support - grieving

## 2024-03-23 NOTE — DISCHARGE NOTE PROVIDER - NSDCMRMEDTOKEN_GEN_ALL_CORE_FT
anastrozole 1 mg oral tablet: 1 tab(s) orally once a day  carvedilol 80 mg oral capsule, extended release: 1 cap(s) orally once a day  latanoprost 0.005% ophthalmic solution: 1 drop(s) in each eye 2 times a day  losartan 50 mg oral tablet: 1 tab(s) orally once a day  pravastatin 40 mg oral tablet: 1 tab(s) orally once a day  Synthroid 75 mcg (0.075 mg) oral tablet: 1 tab(s) orally once a day (at bedtime)

## 2024-03-23 NOTE — DISCHARGE NOTE PROVIDER - NSCORESITESY/N_GEN_A_CORE_RD
You can access the FollowMyHealth Patient Portal offered by [facility] by registering at the following website: [email or web address]. By joining [email or web address]’s FollowMyHealth portal, you will also be able to view your health information using other applications (apps) compatible with our system. Yes

## 2024-03-23 NOTE — DISCHARGE NOTE PROVIDER - CARE PROVIDER_API CALL
Umberto Frias  Cardiovascular Disease  [address] [place] [address], [place] [address]  Phone: [phone]  Fax: [phone]  Follow Up Time:

## 2024-03-23 NOTE — PROGRESS NOTE ADULT - SUBJECTIVE AND OBJECTIVE BOX
Date/Time Patient Seen:  		  Referring MD:   Data Reviewed	       Patient is a 83y old  Female who presents with a chief complaint of High blood pressure (22 Mar 2024 13:29)      Subjective/HPI     PAST MEDICAL & SURGICAL HISTORY:  HTN (hypertension)    HLD (hyperlipidemia)          Medication list         MEDICATIONS  (STANDING):  atorvastatin 10 milliGRAM(s) Oral at bedtime  enoxaparin Injectable 40 milliGRAM(s) SubCutaneous every 24 hours  levothyroxine 75 MICROGram(s) Oral daily  losartan 25 milliGRAM(s) Oral daily  metoprolol tartrate 50 milliGRAM(s) Oral two times a day    MEDICATIONS  (PRN):  acetaminophen     Tablet .. 650 milliGRAM(s) Oral every 6 hours PRN Temp greater or equal to 38C (100.4F), Mild Pain (1 - 3)         Vitals log        ICU Vital Signs Last 24 Hrs  T(C): 36.7 (23 Mar 2024 04:43), Max: 36.8 (22 Mar 2024 13:00)  T(F): 98.1 (23 Mar 2024 04:43), Max: 98.3 (22 Mar 2024 20:33)  HR: 63 (23 Mar 2024 04:43) (63 - 91)  BP: 163/76 (23 Mar 2024 04:43) (150/77 - 177/80)  BP(mean): --  ABP: --  ABP(mean): --  RR: 18 (23 Mar 2024 04:43) (18 - 18)  SpO2: 97% (23 Mar 2024 04:43) (95% - 97%)    O2 Parameters below as of 23 Mar 2024 04:43  Patient On (Oxygen Delivery Method): room air                 Input and Output:  I&O's Detail      Lab Data                        12.1   9.08  )-----------( 320      ( 21 Mar 2024 17:55 )             37.2     03-21    143  |  108  |  11  ----------------------------<  95  3.5   |  26  |  0.75    Ca    9.0      21 Mar 2024 17:55  Mg     2.0     03-21    TPro  7.5  /  Alb  4.0  /  TBili  1.4<H>  /  DBili  x   /  AST  30  /  ALT  54  /  AlkPhos  93  03-21            Review of Systems	      Objective     Physical Examination  heart s1s2  lung dc BS  head nc        Pertinent Lab findings & Imaging      Keller:  NO   Adequate UO     I&O's Detail           Discussed with:     Cultures:	        Radiology                            
[facility] Cardiology Progress Note [phone] (Dr. Frias, Can, Gabbi, Susan)    CHIEF COMPLAINT: Patient is a 83y old  Female who presents with a chief complaint of High blood pressure (23 Mar 2024 05:27)      Follow Up Today: The patient denies any chest discomfort or shortness of breath.    HPI:   84yo F ho htn, hld, hypothyroid, presents with elevated BP. pt has not had her coreg in a week, she ran out. also lost her  in january and had been taking care of him and was unable to care of herself. was seeing her pcp today and noted to have elevated BP so was sent to cardiologist. saw dr miranda and was found ot have /105. pt was given a 5 mg amlodipine and shanon dto go to ED for more aggressive management of her BP. she denies headache, chest pain, sob, vision changes leg swelling or other complaints. pt just feeling anxious (21 Mar 2024 20:45)      PAST MEDICAL & SURGICAL HISTORY:  HTN (hypertension)      HLD (hyperlipidemia)          MEDICATIONS  (STANDING):  atorvastatin 10 milliGRAM(s) Oral at bedtime  enoxaparin Injectable 40 milliGRAM(s) SubCutaneous every 24 hours  levothyroxine 75 MICROGram(s) Oral daily  losartan 25 milliGRAM(s) Oral daily  metoprolol tartrate 50 milliGRAM(s) Oral two times a day    MEDICATIONS  (PRN):  acetaminophen     Tablet .. 650 milliGRAM(s) Oral every 6 hours PRN Temp greater or equal to 38C (100.4F), Mild Pain (1 - 3)      Allergies    No Known Allergies    Intolerances        REVIEW OF SYSTEMS:    All other review of systems is negative unless indicated above    Vital Signs Last 24 Hrs  T(C): 36.7 (23 Mar 2024 04:43), Max: 36.8 (22 Mar 2024 13:00)  T(F): 98.1 (23 Mar 2024 04:43), Max: 98.3 (22 Mar 2024 20:33)  HR: 63 (23 Mar 2024 04:43) (63 - 91)  BP: 163/76 (23 Mar 2024 04:43) (150/77 - 177/80)  BP(mean): --  RR: 18 (23 Mar 2024 04:43) (18 - 18)  SpO2: 97% (23 Mar 2024 04:43) (95% - 97%)    Parameters below as of 23 Mar 2024 04:43  Patient On (Oxygen Delivery Method): room air        I&O's Summary      PHYSICAL EXAM:    Constitutional: NAD, awake and alert, well-developed  Eyes:  EOMI,  Pupils round, No oral cyanosis.  HEENT: No exudate or erythema  Pulmonary: Non-labored, breath sounds are clear bilaterally, No wheezing, rales or rhonchi  Cardiovascular: Regular, S1 and S2, No murmurs  Gastrointestinal: Bowel Sounds present, soft, nontender.   Ext: No significant LE edema  Neurological: Alert, no gross focal motor deficits  Skin: No rashes.  Psych:  Mood & affect appropriate    LABS: All Labs Reviewed:                        12.1   9.08  )-----------( 320      ( 21 Mar 2024 17:55 )             37.2     21 Mar 2024 17:55    143    |  108    |  11     ----------------------------<  95     3.5     |  26     |  0.75     Ca    9.0        21 Mar 2024 17:55  Mg     2.0       21 Mar 2024 17:55    TPro  7.5    /  Alb  4.0    /  TBili  1.4    /  DBili  x      /  AST  30     /  ALT  54     /  AlkPhos  93     21 Mar 2024 17:55          Blood Culture:     03-22 @ 10:00  TSH: 0.80  03-21 @ 17:55  TSH: 0.69      RADIOLOGY/EKG:    Attending Attestation:   50 minutes spent on total encounter; more than 50% of the visit was spent counseling and/or coordinating care by the attending physician.     ASSESSMENT AND PLAN
Patient is a 83y old  Female who presents with a chief complaint of Primary hypertension     (22 Mar 2024 10:58)    Date of servie : 03-22-24 @ 13:29  INTERVAL HPI/OVERNIGHT EVENTS:  T(C): 36.8 (03-22-24 @ 13:00), Max: 37.3 (03-21-24 @ 23:16)  HR: 91 (03-22-24 @ 13:00) (68 - 116)  BP: 177/80 (03-22-24 @ 13:00) (130/71 - 213/97)  RR: 18 (03-22-24 @ 13:00) (16 - 18)  SpO2: 93% (03-22-24 @ 04:45) (93% - 98%)  Wt(kg): --  I&O's Summary      LABS:                        12.1   9.08  )-----------( 320      ( 21 Mar 2024 17:55 )             37.2     03-21    143  |  108  |  11  ----------------------------<  95  3.5   |  26  |  0.75    Ca    9.0      21 Mar 2024 17:55  Mg     2.0     03-21    TPro  7.5  /  Alb  4.0  /  TBili  1.4<H>  /  DBili  x   /  AST  30  /  ALT  54  /  AlkPhos  93  03-21      Urinalysis Basic - ( 21 Mar 2024 17:55 )    Color: x / Appearance: x / SG: x / pH: x  Gluc: 95 mg/dL / Ketone: x  / Bili: x / Urobili: x   Blood: x / Protein: x / Nitrite: x   Leuk Esterase: x / RBC: x / WBC x   Sq Epi: x / Non Sq Epi: x / Bacteria: x      CAPILLARY BLOOD GLUCOSE            Urinalysis Basic - ( 21 Mar 2024 17:55 )    Color: x / Appearance: x / SG: x / pH: x  Gluc: 95 mg/dL / Ketone: x  / Bili: x / Urobili: x   Blood: x / Protein: x / Nitrite: x   Leuk Esterase: x / RBC: x / WBC x   Sq Epi: x / Non Sq Epi: x / Bacteria: x        MEDICATIONS  (STANDING):  atorvastatin 10 milliGRAM(s) Oral at bedtime  enoxaparin Injectable 40 milliGRAM(s) SubCutaneous every 24 hours  levothyroxine 75 MICROGram(s) Oral daily  losartan 25 milliGRAM(s) Oral daily  metoprolol tartrate 25 milliGRAM(s) Oral two times a day    MEDICATIONS  (PRN):  acetaminophen     Tablet .. 650 milliGRAM(s) Oral every 6 hours PRN Temp greater or equal to 38C (100.4F), Mild Pain (1 - 3)  metoprolol tartrate Injectable 5 milliGRAM(s) IV Push every 6 hours PRN for HR more than 100          PHYSICAL EXAM:  GENERAL: NAD, well-groomed, well-developed  HEAD:  Atraumatic, Normocephalic  CHEST/LUNG: Clear to percussion bilaterally; No rales, rhonchi, wheezing, or rubs  HEART: Regular rate and rhythm; No murmurs, rubs, or gallops  ABDOMEN: Soft, Nontender, Nondistended; Bowel sounds present  EXTREMITIES:  2+ Peripheral Pulses, No clubbing, cyanosis, or edema  LYMPH: No lymphadenopathy noted  SKIN: No rashes or lesions    Care Discussed with Consultants/Other Providers [ ] YES  [ ] NO

## 2024-03-23 NOTE — CASE MANAGEMENT PROGRESS NOTE - NSCMPROGRESSNOTE_GEN_ALL_CORE
Per MD, patient is medically cleared for discharge home today.  CM met with patient to discussed discharge disposition home with no formal services (patient declined). Discharge notice signed and copy given to patient. Patient stated her daughter in law will transport her home. Patient verbalized understanding of the transition plan and is in agreement.  CM remains available throughout hospital stay.

## 2024-03-24 RX ORDER — CARVEDILOL PHOSPHATE 80 MG/1
1 CAPSULE, EXTENDED RELEASE ORAL
Refills: 0
Start: 2024-03-24

## 2024-03-24 RX ORDER — CARVEDILOL PHOSPHATE 80 MG/1
1 CAPSULE, EXTENDED RELEASE ORAL
Qty: 30 | Refills: 0
Start: 2024-03-24 | End: 2024-04-22

## 2024-03-25 RX ORDER — CARVEDILOL PHOSPHATE 80 MG/1
1 CAPSULE, EXTENDED RELEASE ORAL
Qty: 30 | Refills: 0
Start: 2024-03-25 | End: 2024-04-23

## 2024-03-25 RX ORDER — CARVEDILOL PHOSPHATE 80 MG/1
1 CAPSULE, EXTENDED RELEASE ORAL
Refills: 0 | DISCHARGE

## 2024-04-19 PROBLEM — E78.5 HYPERLIPIDEMIA, UNSPECIFIED: Chronic | Status: ACTIVE | Noted: 2024-03-21

## 2024-04-19 PROBLEM — I10 ESSENTIAL (PRIMARY) HYPERTENSION: Chronic | Status: ACTIVE | Noted: 2024-03-21

## 2024-04-25 ENCOUNTER — APPOINTMENT (OUTPATIENT)
Dept: ORTHOPEDIC SURGERY | Facility: CLINIC | Age: 84
End: 2024-04-25
Payer: MEDICARE

## 2024-04-25 VITALS — HEIGHT: 65 IN | WEIGHT: 135 LBS | BODY MASS INDEX: 22.49 KG/M2

## 2024-04-25 DIAGNOSIS — M43.16 SPONDYLOLISTHESIS, LUMBAR REGION: ICD-10-CM

## 2024-04-25 DIAGNOSIS — M70.62 TROCHANTERIC BURSITIS, LEFT HIP: ICD-10-CM

## 2024-04-25 DIAGNOSIS — M51.16 INTERVERTEBRAL DISC DISORDERS WITH RADICULOPATHY, LUMBAR REGION: ICD-10-CM

## 2024-04-25 DIAGNOSIS — I10 ESSENTIAL (PRIMARY) HYPERTENSION: ICD-10-CM

## 2024-04-25 DIAGNOSIS — M51.36 OTHER INTERVERTEBRAL DISC DEGENERATION, LUMBAR REGION: ICD-10-CM

## 2024-04-25 PROBLEM — Z00.00 ENCOUNTER FOR PREVENTIVE HEALTH EXAMINATION: Status: ACTIVE | Noted: 2024-04-25

## 2024-04-25 PROCEDURE — 20610 DRAIN/INJ JOINT/BURSA W/O US: CPT | Mod: LT

## 2024-04-25 PROCEDURE — 99204 OFFICE O/P NEW MOD 45 MIN: CPT | Mod: 25

## 2024-04-25 RX ORDER — CARVEDILOL PHOSPHATE 80 MG/1
80 CAPSULE, EXTENDED RELEASE ORAL
Refills: 0 | Status: ACTIVE | COMMUNITY

## 2024-04-25 RX ORDER — LEVOTHYROXINE SODIUM 75 UG/1
75 CAPSULE ORAL
Refills: 0 | Status: ACTIVE | COMMUNITY

## 2024-04-25 RX ORDER — IBUPROFEN 800 MG/1
800 TABLET, FILM COATED ORAL
Refills: 0 | Status: ACTIVE | COMMUNITY

## 2024-04-25 RX ORDER — LATANOPROST/PF 0.005 %
DROPS OPHTHALMIC (EYE)
Refills: 0 | Status: ACTIVE | COMMUNITY

## 2024-04-25 RX ORDER — FERROUS SULFATE 325(65) MG
TABLET ORAL
Refills: 0 | Status: ACTIVE | COMMUNITY

## 2024-04-25 RX ORDER — PRAVASTATIN SODIUM 40 MG/1
40 TABLET ORAL
Refills: 0 | Status: ACTIVE | COMMUNITY

## 2024-04-25 RX ORDER — ANASTROZOLE 1 MG/1
1 TABLET ORAL
Refills: 0 | Status: ACTIVE | COMMUNITY

## 2024-04-25 RX ORDER — METHYLPREDNISOLONE 4 MG/1
4 TABLET ORAL
Qty: 1 | Refills: 0 | Status: ACTIVE | COMMUNITY
Start: 2024-04-25 | End: 1900-01-01

## 2024-04-25 RX ORDER — IRBESARTAN 150 MG/1
150 TABLET ORAL
Refills: 0 | Status: ACTIVE | COMMUNITY

## 2024-04-25 NOTE — HISTORY OF PRESENT ILLNESS
[10] : 10 [Dull/Aching] : dull/aching [Constant] : constant [Household chores] : household chores [Leisure] : leisure [Sleep] : sleep [Meds] : meds [Walking] : walking [de-identified] : Has pain left hip, radiating down leg to ankle. Had a fall 3 months ago, seemed to start after that. Walks without ambulatory aide. Had recent xrays of back and hip, referred to office [] : no [FreeTextEntry1] : Left hip [FreeTextEntry3] : 12/27/23 [FreeTextEntry5] : Pain started 12/27/23, patient fell. Wasn't initially aware that she hurt her hip, but states pain radiates down her left leg. Pain got worse over time, had x-ray at [facility] [FreeTextEntry7] : Pain radiates down left leg to foot

## 2024-04-25 NOTE — DATA REVIEWED
[Lumbar Spine] : lumbar spine [Outside X-rays] : outside x-rays [Left] : left [Hip] : hip [I reviewed the films/CD and agree] : I reviewed the films/CD and agree [FreeTextEntry1] : ddd, spondylolisthesis L4/5 [FreeTextEntry2] : negative

## 2024-04-25 NOTE — PHYSICAL EXAM
[Absent] : achilles reflex absent [Left] : left hip [5___] : flexion 5[unfilled]/5 [] : no groin pain with resisted straight leg raise

## 2024-04-25 NOTE — PROCEDURE
[Large Joint Injection] : Large joint injection [Left] : of the left [Pain] : pain [Inflammation] : inflammation [X-ray evidence of Osteoarthritis on this or prior visit] : x-ray evidence of Osteoarthritis on this or prior visit [Alcohol] : alcohol [Betadine] : betadine [Ethyl Chloride sprayed topically] : ethyl chloride sprayed topically [Sterile technique used] : sterile technique used [___ cc    6mg] :  Betamethasone (Celestone) ~Vcc of 6mg [___ cc    0.5%] : Bupivacaine (Marcaine) ~Vcc of 0.5%  [] : Patient tolerated procedure well [Call if redness, pain or fever occur] : call if redness, pain or fever occur [Apply ice for 15min out of every hour for the next 12-24 hours as tolerated] : apply ice for 15 minutes out of every hour for the next 12-24 hours as tolerated [Patient was advised to rest the joint(s) for ____ days] : patient was advised to rest the joint(s) for [unfilled] days [Previous OTC use and PT nontherapeutic] : patient has tried OTC's including aspirin, Ibuprofen, Aleve, etc or prescription NSAIDS, and/or exercises at home and/or physical therapy without satisfactory response [Patient had decreased mobility in the joint] : patient had decreased mobility in the joint [Risks, benefits, alternatives discussed / Verbal consent obtained] : the risks benefits, and alternatives have been discussed, and verbal consent was obtained

## 2024-04-28 ENCOUNTER — INPATIENT (INPATIENT)
Facility: HOSPITAL | Age: 84
LOS: 14 days | Discharge: NURSING, REHAB OR LONG-TERM CARE | DRG: 66 | End: 2024-05-13
Attending: STUDENT IN AN ORGANIZED HEALTH CARE EDUCATION/TRAINING PROGRAM | Admitting: STUDENT IN AN ORGANIZED HEALTH CARE EDUCATION/TRAINING PROGRAM
Payer: MEDICARE

## 2024-04-28 ENCOUNTER — EMERGENCY (EMERGENCY)
Facility: HOSPITAL | Age: 84
LOS: 1 days | Discharge: ANOTHER HOSPITAL | End: 2024-04-28
Attending: STUDENT IN AN ORGANIZED HEALTH CARE EDUCATION/TRAINING PROGRAM | Admitting: STUDENT IN AN ORGANIZED HEALTH CARE EDUCATION/TRAINING PROGRAM
Payer: MEDICARE

## 2024-04-28 VITALS
HEIGHT: 65 IN | HEART RATE: 69 BPM | DIASTOLIC BLOOD PRESSURE: 63 MMHG | SYSTOLIC BLOOD PRESSURE: 128 MMHG | RESPIRATION RATE: 16 BRPM | TEMPERATURE: 98 F | OXYGEN SATURATION: 95 %

## 2024-04-28 VITALS
DIASTOLIC BLOOD PRESSURE: 62 MMHG | OXYGEN SATURATION: 96 % | TEMPERATURE: 98 F | RESPIRATION RATE: 16 BRPM | HEART RATE: 61 BPM | SYSTOLIC BLOOD PRESSURE: 137 MMHG

## 2024-04-28 VITALS
RESPIRATION RATE: 16 BRPM | HEIGHT: 65 IN | HEART RATE: 56 BPM | TEMPERATURE: 98 F | DIASTOLIC BLOOD PRESSURE: 102 MMHG | SYSTOLIC BLOOD PRESSURE: 214 MMHG | OXYGEN SATURATION: 96 %

## 2024-04-28 DIAGNOSIS — I61.5 NONTRAUMATIC INTRACEREBRAL HEMORRHAGE, INTRAVENTRICULAR: ICD-10-CM

## 2024-04-28 LAB
ALBUMIN SERPL ELPH-MCNC: 3.2 G/DL — LOW (ref 3.3–5)
ALBUMIN SERPL ELPH-MCNC: 3.8 G/DL — SIGNIFICANT CHANGE UP (ref 3.3–5.2)
ALP SERPL-CCNC: 62 U/L — SIGNIFICANT CHANGE UP (ref 40–120)
ALP SERPL-CCNC: 63 U/L — SIGNIFICANT CHANGE UP (ref 40–120)
ALT FLD-CCNC: 47 U/L — HIGH
ALT FLD-CCNC: 60 U/L — SIGNIFICANT CHANGE UP (ref 12–78)
ANION GAP SERPL CALC-SCNC: 13 MMOL/L — SIGNIFICANT CHANGE UP (ref 5–17)
ANION GAP SERPL CALC-SCNC: 7 MMOL/L — SIGNIFICANT CHANGE UP (ref 5–17)
APTT BLD: 30.3 SEC — SIGNIFICANT CHANGE UP (ref 24.5–35.6)
AST SERPL-CCNC: 30 U/L — SIGNIFICANT CHANGE UP
AST SERPL-CCNC: 43 U/L — HIGH (ref 15–37)
BASOPHILS # BLD AUTO: 0 K/UL — SIGNIFICANT CHANGE UP (ref 0–0.2)
BASOPHILS # BLD AUTO: 0.01 K/UL — SIGNIFICANT CHANGE UP (ref 0–0.2)
BASOPHILS NFR BLD AUTO: 0 % — SIGNIFICANT CHANGE UP (ref 0–2)
BASOPHILS NFR BLD AUTO: 0.1 % — SIGNIFICANT CHANGE UP (ref 0–2)
BILIRUB SERPL-MCNC: 0.9 MG/DL — SIGNIFICANT CHANGE UP (ref 0.4–2)
BILIRUB SERPL-MCNC: 1.1 MG/DL — SIGNIFICANT CHANGE UP (ref 0.2–1.2)
BLD GP AB SCN SERPL QL: SIGNIFICANT CHANGE UP
BUN SERPL-MCNC: 21 MG/DL — HIGH (ref 8–20)
BUN SERPL-MCNC: 21 MG/DL — SIGNIFICANT CHANGE UP (ref 7–23)
BURR CELLS BLD QL SMEAR: PRESENT — SIGNIFICANT CHANGE UP
CALCIUM SERPL-MCNC: 8 MG/DL — LOW (ref 8.5–10.1)
CALCIUM SERPL-MCNC: 8.4 MG/DL — SIGNIFICANT CHANGE UP (ref 8.4–10.5)
CHLORIDE SERPL-SCNC: 101 MMOL/L — SIGNIFICANT CHANGE UP (ref 96–108)
CHLORIDE SERPL-SCNC: 94 MMOL/L — LOW (ref 96–108)
CO2 SERPL-SCNC: 22 MMOL/L — SIGNIFICANT CHANGE UP (ref 22–29)
CO2 SERPL-SCNC: 25 MMOL/L — SIGNIFICANT CHANGE UP (ref 22–31)
CREAT SERPL-MCNC: 0.43 MG/DL — LOW (ref 0.5–1.3)
CREAT SERPL-MCNC: 0.61 MG/DL — SIGNIFICANT CHANGE UP (ref 0.5–1.3)
EGFR: 88 ML/MIN/1.73M2 — SIGNIFICANT CHANGE UP
EGFR: 95 ML/MIN/1.73M2 — SIGNIFICANT CHANGE UP
ELLIPTOCYTES BLD QL SMEAR: SLIGHT — SIGNIFICANT CHANGE UP
EOSINOPHIL # BLD AUTO: 0 K/UL — SIGNIFICANT CHANGE UP (ref 0–0.5)
EOSINOPHIL # BLD AUTO: 0 K/UL — SIGNIFICANT CHANGE UP (ref 0–0.5)
EOSINOPHIL NFR BLD AUTO: 0 % — SIGNIFICANT CHANGE UP (ref 0–6)
EOSINOPHIL NFR BLD AUTO: 0 % — SIGNIFICANT CHANGE UP (ref 0–6)
GLUCOSE SERPL-MCNC: 122 MG/DL — HIGH (ref 70–99)
GLUCOSE SERPL-MCNC: 139 MG/DL — HIGH (ref 70–99)
HCT VFR BLD CALC: 33.2 % — LOW (ref 34.5–45)
HCT VFR BLD CALC: 33.3 % — LOW (ref 34.5–45)
HGB BLD-MCNC: 11 G/DL — LOW (ref 11.5–15.5)
HGB BLD-MCNC: 11.3 G/DL — LOW (ref 11.5–15.5)
IMM GRANULOCYTES NFR BLD AUTO: 0.8 % — SIGNIFICANT CHANGE UP (ref 0–0.9)
INR BLD: 1.03 RATIO — SIGNIFICANT CHANGE UP (ref 0.85–1.18)
LYMPHOCYTES # BLD AUTO: 1.28 K/UL — SIGNIFICANT CHANGE UP (ref 1–3.3)
LYMPHOCYTES # BLD AUTO: 1.29 K/UL — SIGNIFICANT CHANGE UP (ref 1–3.3)
LYMPHOCYTES # BLD AUTO: 7.8 % — LOW (ref 13–44)
LYMPHOCYTES # BLD AUTO: 9.4 % — LOW (ref 13–44)
MAGNESIUM SERPL-MCNC: 1.8 MG/DL — SIGNIFICANT CHANGE UP (ref 1.6–2.6)
MANUAL SMEAR VERIFICATION: SIGNIFICANT CHANGE UP
MCHC RBC-ENTMCNC: 29.5 PG — SIGNIFICANT CHANGE UP (ref 27–34)
MCHC RBC-ENTMCNC: 30.1 PG — SIGNIFICANT CHANGE UP (ref 27–34)
MCHC RBC-ENTMCNC: 33.1 GM/DL — SIGNIFICANT CHANGE UP (ref 32–36)
MCHC RBC-ENTMCNC: 33.9 GM/DL — SIGNIFICANT CHANGE UP (ref 32–36)
MCV RBC AUTO: 88.6 FL — SIGNIFICANT CHANGE UP (ref 80–100)
MCV RBC AUTO: 89 FL — SIGNIFICANT CHANGE UP (ref 80–100)
MONOCYTES # BLD AUTO: 1.47 K/UL — HIGH (ref 0–0.9)
MONOCYTES # BLD AUTO: 2.02 K/UL — HIGH (ref 0–0.9)
MONOCYTES NFR BLD AUTO: 10.8 % — SIGNIFICANT CHANGE UP (ref 2–14)
MONOCYTES NFR BLD AUTO: 12.2 % — SIGNIFICANT CHANGE UP (ref 2–14)
NEUTROPHILS # BLD AUTO: 10.69 K/UL — HIGH (ref 1.8–7.4)
NEUTROPHILS # BLD AUTO: 13.22 K/UL — HIGH (ref 1.8–7.4)
NEUTROPHILS NFR BLD AUTO: 78.9 % — HIGH (ref 43–77)
NEUTROPHILS NFR BLD AUTO: 79.1 % — HIGH (ref 43–77)
NEUTS BAND # BLD: 0.9 % — SIGNIFICANT CHANGE UP (ref 0–8)
NRBC # BLD: 0 /100 WBCS — SIGNIFICANT CHANGE UP (ref 0–0)
PHOSPHATE SERPL-MCNC: 2.1 MG/DL — LOW (ref 2.5–4.5)
PLAT MORPH BLD: NORMAL — SIGNIFICANT CHANGE UP
PLATELET # BLD AUTO: 266 K/UL — SIGNIFICANT CHANGE UP (ref 150–400)
PLATELET # BLD AUTO: 273 K/UL — SIGNIFICANT CHANGE UP (ref 150–400)
POIKILOCYTOSIS BLD QL AUTO: SLIGHT — SIGNIFICANT CHANGE UP
POLYCHROMASIA BLD QL SMEAR: SLIGHT — SIGNIFICANT CHANGE UP
POTASSIUM SERPL-MCNC: 3.5 MMOL/L — SIGNIFICANT CHANGE UP (ref 3.5–5.3)
POTASSIUM SERPL-MCNC: 3.9 MMOL/L — SIGNIFICANT CHANGE UP (ref 3.5–5.3)
POTASSIUM SERPL-SCNC: 3.5 MMOL/L — SIGNIFICANT CHANGE UP (ref 3.5–5.3)
POTASSIUM SERPL-SCNC: 3.9 MMOL/L — SIGNIFICANT CHANGE UP (ref 3.5–5.3)
PROT SERPL-MCNC: 6.1 G/DL — LOW (ref 6.6–8.7)
PROT SERPL-MCNC: 6.5 G/DL — SIGNIFICANT CHANGE UP (ref 6–8.3)
PROTHROM AB SERPL-ACNC: 11.4 SEC — SIGNIFICANT CHANGE UP (ref 9.5–13)
RBC # BLD: 3.73 M/UL — LOW (ref 3.8–5.2)
RBC # BLD: 3.76 M/UL — LOW (ref 3.8–5.2)
RBC # FLD: 13.7 % — SIGNIFICANT CHANGE UP (ref 10.3–14.5)
RBC # FLD: 13.8 % — SIGNIFICANT CHANGE UP (ref 10.3–14.5)
RBC BLD AUTO: ABNORMAL
SODIUM SERPL-SCNC: 129 MMOL/L — LOW (ref 135–145)
SODIUM SERPL-SCNC: 133 MMOL/L — LOW (ref 135–145)
TROPONIN I, HIGH SENSITIVITY RESULT: 3.3 NG/L — SIGNIFICANT CHANGE UP
WBC # BLD: 13.56 K/UL — HIGH (ref 3.8–10.5)
WBC # BLD: 16.53 K/UL — HIGH (ref 3.8–10.5)
WBC # FLD AUTO: 13.56 K/UL — HIGH (ref 3.8–10.5)
WBC # FLD AUTO: 16.53 K/UL — HIGH (ref 3.8–10.5)

## 2024-04-28 PROCEDURE — 83735 ASSAY OF MAGNESIUM: CPT

## 2024-04-28 PROCEDURE — 99291 CRITICAL CARE FIRST HOUR: CPT | Mod: FS

## 2024-04-28 PROCEDURE — 70450 CT HEAD/BRAIN W/O DYE: CPT | Mod: 26,XU,MC

## 2024-04-28 PROCEDURE — 84484 ASSAY OF TROPONIN QUANT: CPT

## 2024-04-28 PROCEDURE — 70450 CT HEAD/BRAIN W/O DYE: CPT | Mod: 26,59,MC

## 2024-04-28 PROCEDURE — 93005 ELECTROCARDIOGRAM TRACING: CPT

## 2024-04-28 PROCEDURE — 70450 CT HEAD/BRAIN W/O DYE: CPT | Mod: XU,MC

## 2024-04-28 PROCEDURE — 70498 CT ANGIOGRAPHY NECK: CPT | Mod: 26,MC

## 2024-04-28 PROCEDURE — 99291 CRITICAL CARE FIRST HOUR: CPT

## 2024-04-28 PROCEDURE — 70496 CT ANGIOGRAPHY HEAD: CPT | Mod: 26,MC

## 2024-04-28 PROCEDURE — 82962 GLUCOSE BLOOD TEST: CPT

## 2024-04-28 PROCEDURE — 99292 CRITICAL CARE ADDL 30 MIN: CPT | Mod: FS

## 2024-04-28 PROCEDURE — 96375 TX/PRO/DX INJ NEW DRUG ADDON: CPT | Mod: XU

## 2024-04-28 PROCEDURE — 84100 ASSAY OF PHOSPHORUS: CPT

## 2024-04-28 PROCEDURE — 36415 COLL VENOUS BLD VENIPUNCTURE: CPT

## 2024-04-28 PROCEDURE — 96374 THER/PROPH/DIAG INJ IV PUSH: CPT | Mod: XU

## 2024-04-28 PROCEDURE — 96376 TX/PRO/DX INJ SAME DRUG ADON: CPT | Mod: XU

## 2024-04-28 PROCEDURE — 70496 CT ANGIOGRAPHY HEAD: CPT | Mod: MC

## 2024-04-28 PROCEDURE — 93010 ELECTROCARDIOGRAM REPORT: CPT

## 2024-04-28 PROCEDURE — 85025 COMPLETE CBC W/AUTO DIFF WBC: CPT

## 2024-04-28 PROCEDURE — 80053 COMPREHEN METABOLIC PANEL: CPT

## 2024-04-28 PROCEDURE — 70498 CT ANGIOGRAPHY NECK: CPT | Mod: MC

## 2024-04-28 RX ORDER — CHLORHEXIDINE GLUCONATE 213 G/1000ML
1 SOLUTION TOPICAL DAILY
Refills: 0 | Status: DISCONTINUED | OUTPATIENT
Start: 2024-04-28 | End: 2024-04-30

## 2024-04-28 RX ORDER — SODIUM CHLORIDE 5 G/100ML
1000 INJECTION, SOLUTION INTRAVENOUS
Refills: 0 | Status: DISCONTINUED | OUTPATIENT
Start: 2024-04-28 | End: 2024-04-29

## 2024-04-28 RX ORDER — ONDANSETRON 8 MG/1
4 TABLET, FILM COATED ORAL ONCE
Refills: 0 | Status: COMPLETED | OUTPATIENT
Start: 2024-04-28 | End: 2024-04-28

## 2024-04-28 RX ORDER — ANDEXANET ALFA 200 MG/20ML
480 INJECTION, POWDER, LYOPHILIZED, FOR SOLUTION INTRAVENOUS ONCE
Refills: 0 | Status: COMPLETED | OUTPATIENT
Start: 2024-04-28 | End: 2024-04-28

## 2024-04-28 RX ORDER — HYDRALAZINE HCL 50 MG
10 TABLET ORAL ONCE
Refills: 0 | Status: COMPLETED | OUTPATIENT
Start: 2024-04-28 | End: 2024-04-28

## 2024-04-28 RX ORDER — NICARDIPINE HYDROCHLORIDE 30 MG/1
5 CAPSULE, EXTENDED RELEASE ORAL
Qty: 40 | Refills: 0 | Status: DISCONTINUED | OUTPATIENT
Start: 2024-04-28 | End: 2024-05-02

## 2024-04-28 RX ORDER — HYDRALAZINE HCL 50 MG
10 TABLET ORAL
Refills: 0 | Status: DISCONTINUED | OUTPATIENT
Start: 2024-04-28 | End: 2024-05-13

## 2024-04-28 RX ORDER — NICARDIPINE HYDROCHLORIDE 30 MG/1
5 CAPSULE, EXTENDED RELEASE ORAL
Qty: 40 | Refills: 0 | Status: DISCONTINUED | OUTPATIENT
Start: 2024-04-28 | End: 2024-04-30

## 2024-04-28 RX ORDER — ANDEXANET ALFA 200 MG/20ML
400 INJECTION, POWDER, LYOPHILIZED, FOR SOLUTION INTRAVENOUS ONCE
Refills: 0 | Status: COMPLETED | OUTPATIENT
Start: 2024-04-28 | End: 2024-04-28

## 2024-04-28 RX ORDER — MORPHINE SULFATE 50 MG/1
2 CAPSULE, EXTENDED RELEASE ORAL ONCE
Refills: 0 | Status: DISCONTINUED | OUTPATIENT
Start: 2024-04-28 | End: 2024-04-28

## 2024-04-28 RX ORDER — ACETAMINOPHEN 500 MG
1000 TABLET ORAL ONCE
Refills: 0 | Status: COMPLETED | OUTPATIENT
Start: 2024-04-28 | End: 2024-04-28

## 2024-04-28 RX ORDER — POTASSIUM CHLORIDE 20 MEQ
10 PACKET (EA) ORAL
Refills: 0 | Status: COMPLETED | OUTPATIENT
Start: 2024-04-28 | End: 2024-04-29

## 2024-04-28 RX ORDER — CHLORHEXIDINE GLUCONATE 213 G/1000ML
1 SOLUTION TOPICAL DAILY
Refills: 0 | Status: DISCONTINUED | OUTPATIENT
Start: 2024-04-28 | End: 2024-04-28

## 2024-04-28 RX ORDER — NICARDIPINE HYDROCHLORIDE 30 MG/1
5 CAPSULE, EXTENDED RELEASE ORAL
Qty: 40 | Refills: 0 | Status: DISCONTINUED | OUTPATIENT
Start: 2024-04-28 | End: 2024-04-28

## 2024-04-28 RX ORDER — SODIUM CHLORIDE 9 MG/ML
1000 INJECTION INTRAMUSCULAR; INTRAVENOUS; SUBCUTANEOUS
Refills: 0 | Status: DISCONTINUED | OUTPATIENT
Start: 2024-04-28 | End: 2024-04-28

## 2024-04-28 RX ORDER — SENNA PLUS 8.6 MG/1
2 TABLET ORAL AT BEDTIME
Refills: 0 | Status: DISCONTINUED | OUTPATIENT
Start: 2024-04-28 | End: 2024-05-13

## 2024-04-28 RX ORDER — LABETALOL HCL 100 MG
10 TABLET ORAL
Refills: 0 | Status: DISCONTINUED | OUTPATIENT
Start: 2024-04-28 | End: 2024-05-13

## 2024-04-28 RX ADMIN — ANDEXANET ALFA 184.62 MILLIGRAM(S): 200 INJECTION, POWDER, LYOPHILIZED, FOR SOLUTION INTRAVENOUS at 20:38

## 2024-04-28 RX ADMIN — MORPHINE SULFATE 2 MILLIGRAM(S): 50 CAPSULE, EXTENDED RELEASE ORAL at 23:50

## 2024-04-28 RX ADMIN — NICARDIPINE HYDROCHLORIDE 25 MG/HR: 30 CAPSULE, EXTENDED RELEASE ORAL at 20:09

## 2024-04-28 RX ADMIN — Medication 1000 MILLIGRAM(S): at 23:45

## 2024-04-28 RX ADMIN — Medication 400 MILLIGRAM(S): at 23:07

## 2024-04-28 RX ADMIN — NICARDIPINE HYDROCHLORIDE 25 MG/HR: 30 CAPSULE, EXTENDED RELEASE ORAL at 23:37

## 2024-04-28 RX ADMIN — ONDANSETRON 4 MILLIGRAM(S): 8 TABLET, FILM COATED ORAL at 20:24

## 2024-04-28 RX ADMIN — MORPHINE SULFATE 2 MILLIGRAM(S): 50 CAPSULE, EXTENDED RELEASE ORAL at 23:40

## 2024-04-28 RX ADMIN — ANDEXANET ALFA 24 MILLIGRAM(S): 200 INJECTION, POWDER, LYOPHILIZED, FOR SOLUTION INTRAVENOUS at 20:43

## 2024-04-28 RX ADMIN — Medication 10 MILLIGRAM(S): at 19:52

## 2024-04-28 NOTE — ED PROVIDER NOTE - PHYSICAL EXAMINATION
Constitutional: Awake, Alert, non-toxic. NAD  HEAD: Normocephalic, atraumatic.   EYES: PERRL, EOM intact, conjunctiva and sclera are clear bilaterally  ENT: No rhinorrhea, normal pharynx, patent, no tonsillar exudate or enlargement, mucous membranes pink/moist, no erythema, no drooling or stridor.   NECK: Supple, non-tender  CARDIOVASCULAR: Normal S1, S2; regular rate and rhythm.  RESPIRATORY: Normal respiratory effort; breath sounds CTAB, no wheezes, rhonchi, or rales. Speaking in full sentences. No accessory muscle use.   ABDOMEN: Soft; non-tender, non-distended  EXTREMITIES: Full passive and active ROM in all extremities; non-tender to palpation; distal pulses palpable and symmetric, no edema, no crepitus or step off  SKIN: Warm, dry; good skin turgor, no apparent lesions or rashes, no ecchymosis, brisk capillary refill.  NEURO: A&O x3. Sensory and motor functions are grossly intact. Speech is normal. Appearance and judgement seem appropriate for gender and age. No neurological deficits. Neurovascular sensation intact motor function 5/5 of upper and lower extremities, CN II-XII grossly intact, no ataxia, absent pronator drift, intact cerebellar function. Speech clear, without articulation or word-finding difficulties. Eyes- PERRL bilaterally. EOMs in tact. No nystagmus. No facial droop. NIH-0
Pt stated he is a drinker and drank 4 beers yesterday

## 2024-04-28 NOTE — ED PROVIDER NOTE - CONSTITUTIONAL, MLM
normal... Well appearing, awake, alert, oriented to person, place only, not time and in no apparent distress.

## 2024-04-28 NOTE — ED ADULT NURSE NOTE - HOW OFTEN DO YOU HAVE A DRINK CONTAINING ALCOHOL?
Refill Routing Note   Medication(s) are not appropriate for processing by [facility] for the following reason(s):      Medication outside of protocol: OMEPRAZOLE dosage outside [facility] protocol    [facility] action(s):  Route  Approve Care Due:  Labs due              Appointments  past 12m or future 3m with PCP    Date Provider   Last Visit   8/8/2023 Janett Montoya MD   Next Visit   10/12/2023 Janett Montoya MD   ED visits in past 90 days: 0        Note composed:12:43 PM 10/12/2023         Never

## 2024-04-28 NOTE — H&P ADULT - NSHPLABSRESULTS_GEN_ALL_CORE
Radiology performed @ [facility]   CT head performed @ [facility] 4/28 @ 7:56PM   Intraventricular hemorrhage, as described above. Ill-defined parenchymal hemorrhage within the left caudate body and caudate head. Mild dilatation of the lateral and third ventricles.  Critical findings were discussed with Dr. Elizabeth at 8:08 PM on 4/28/2024.    CTA NECK @ [facility] 8:01 PM : No significant stenosis of the cervical carotid arteries based on NASCET criteria. Patent cervical vertebral arteries.    CTA HEAD@ [facility]: No high-grade stenosis or occlusion of the major proximal arterial branches. No evidence of an intracranial arterial aneurysm or arteriovenous malformation on CTA. No evidence of active bleeding within the left caudate and intraventricular hemorrhage.

## 2024-04-28 NOTE — ED PROVIDER NOTE - OBJECTIVE STATEMENT
84 y/o female with PMHx HTN and A Fib (on Eliquis) BIBA from home due to headache. pt reports her blood pressure was elevated earlier today but could not recall the number. According to EMS< BP around 206 systolic. pt currently on Carvedilol. pt unable to recall other bp meds. pt denies cp, sob, visual changes, slurred speech, fall/head injury, numbness/weakness, or any other complaints.

## 2024-04-28 NOTE — ED PROVIDER NOTE - CRITICAL CARE ATTENDING CONTRIBUTION TO CARE
82 y/o F PMH of HTN, HLD, Afib on Eliquis, Hypothyroidism with headache.  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 Myrick by DIEUDONNE Chatman accepted for Tier 1 transfer   Remains AAOx3 protecting airway NIHHS: 0 82 y/o F PMH of HTN, HLD, Afib on Eliquis, Hypothyroidism with headache.  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 Myrick by DIEUDONNE Chatman accepted for Tier 1 transfer   Remains AAOx3 protecting airway NIHHS: 0    The patient's condition is critical. Management options were put in place to ensure further deterioration does not occur. In addition to the usual care provided, I have spent additional time with this patient through but not limited to the following: additional documentation  reviewing test results,  discussing with consultants,  discussing with patient / patient's family prognosis and course of care,  reassessment of patient's status and response to interventions.

## 2024-04-28 NOTE — ED ADULT TRIAGE NOTE - CHIEF COMPLAINT QUOTE
Transfer from [facility] for intraventricular bleed.  Pt with HA and vomiting at 12pm. On Eliquis. On Nicardipine gtt at 5mg/ hour and Adexanate gtt. Pt brought to critical care.  MD edge called to bedside.

## 2024-04-28 NOTE — ED ADULT NURSE NOTE - CHPI ED NUR SYMPTOMS NEG
no blurred vision/no change in level of consciousness/no dizziness/no fever/no loss of consciousness/no numbness/no weakness

## 2024-04-28 NOTE — ED ADULT NURSE NOTE - NSFALLHARMRISKINTERV_ED_ALL_ED
Assistance OOB with selected safe patient handling equipment if applicable/Assistance with ambulation/Communicate risk of Fall with Harm to all staff, patient, and family/Monitor gait and stability/Monitor for mental status changes and reorient to person, place, and time, as needed/Provide visual cue: red socks, yellow wristband, yellow gown, etc/Reinforce activity limits and safety measures with patient and family/Toileting schedule using arm’s reach rule for commode and bathroom/Use of alarms - bed, stretcher, chair and/or video monitoring/Bed in lowest position, wheels locked, appropriate side rails in place/Call bell, personal items and telephone in reach/Instruct patient to call for assistance before getting out of bed/chair/stretcher/Non-slip footwear applied when patient is off stretcher/[place] to call system/Physically safe environment - no spills, clutter or unnecessary equipment/Purposeful Proactive Rounding/Room/bathroom lighting operational, light cord in reach

## 2024-04-28 NOTE — ED ADULT NURSE NOTE - NSFALLHARMRISKINTERV_ED_ALL_ED

## 2024-04-28 NOTE — ED ADULT NURSE NOTE - OBJECTIVE STATEMENT
Pt BIBEMS c/o headache since 8am worsening throughout the day with associated nausea.  Denies any chest pain or SOB.  Pt vomited x3 in ED.  h/o afib- pt takes eliquis and carvedilol.  No neuro deficits noted.  Denies vision changes.  Maintain comfort.

## 2024-04-28 NOTE — ED ADULT NURSE NOTE - CHIEF COMPLAINT QUOTE
Transfer from [facility] for intraventricular bleed.  Pt with HA and vomiting at 12pm. On Eliquis. On Nicardipine gtt at 5mg/ hour and Adexanate gtt. Pt brought to critical care.  MD edge called to bedside.

## 2024-04-28 NOTE — ED PROVIDER NOTE - OBJECTIVE STATEMENT
83-year-old female past medical history of hypertension, hyperlipidemia, A-fib on Eliquis presents as a transfer from [facility] for intraventricular hemorrhage.  Patient reports she had acute onset headache and confusion, was brought to [facility], where she was found to be markedly hypertensive with a systolic blood pressure over 200 and an acute intraventricular hemorrhage.  She was given andexanet jennifer for the Eliquis, started on a Cardene drip and transferred here for neurosurgical evaluation.

## 2024-04-28 NOTE — CONSULT NOTE ADULT - SUBJECTIVE AND OBJECTIVE BOX
HPI: 83F transfer from [facility] where she presented earlier today after headache, vomited in ED and CTH was done showing intraventricular hemorrhage, SBP there was > 200, pt on eliquis for Afib -   Andexa and KCentra given.      On arrival she is Alert and conversant, denies headache, N/V vision changes.  Lives alone,  recently passed away.        PAST MEDICAL & SURGICAL HISTORY:  HTN (hypertension)      HLD (hyperlipidemia)    2020 breast cancer - surgery, no chemo no radiation      FAMILY HISTORY:      HOME MEDICATIONS:  Home Medications:  anastrozole 1 mg oral tablet: 1 tab(s) orally once a day (22 Mar 2024 07:55)  latanoprost 0.005% ophthalmic solution: 1 drop(s) in each eye 2 times a day (22 Mar 2024 07:55)  pravastatin 40 mg oral tablet: 1 tab(s) orally once a day (22 Mar 2024 07:54)  Synthroid 75 mcg (0.075 mg) oral tablet: 1 tab(s) orally once a day (at bedtime) (22 Mar 2024 07:53)      MEDICATIONS:  Antibiotics:    Neuro:    Anticoagulation:    OTHER:  niCARdipine Infusion 5 mG/Hr IV Continuous <Continuous>    IVF:        Allergies    No Known Allergies    Intolerances          SOCIAL HISTORY:  Tobacco Use: denies  EtOH use:  denies  Substance:    LABS:                        11.0   13.56 )-----------( 273      ( 28 Apr 2024 20:20 )             33.2     04-28    133<L>  |  101  |  21  ----------------------------<  139<H>  3.9   |  25  |  0.61    Ca    8.0<L>      28 Apr 2024 20:20  Phos  2.1     04-28  Mg     1.8     04-28    TPro  6.5  /  Alb  3.2<L>  /  TBili  1.1  /  DBili  x   /  AST  43<H>  /  ALT  60  /  AlkPhos  62  04-28      Urinalysis Basic - ( 28 Apr 2024 20:20 )    Color: x / Appearance: x / SG: x / pH: x  Gluc: 139 mg/dL / Ketone: x  / Bili: x / Urobili: x   Blood: x / Protein: x / Nitrite: x   Leuk Esterase: x / RBC: x / WBC x   Sq Epi: x / Non Sq Epi: x / Bacteria: x        CULTURES:      RADIOLOGY & ADDITIONAL STUDIES:    [facility] - CTH shows IVH, 4th ventricle casted    CTA negative for aneurysm or malformation        REVIEW OF SYSTEMS  RESPIRATORY: No cough, wheezing  CARDIOVASCULAR: No chest pain, palpitations  GASTROINTESTINAL: No abdominal or epigastric pain  GENITOURINARY: No dysuria, frequency        Vital Signs Last 24 Hrs  T(C): 36.6 (28 Apr 2024 21:34), Max: 36.9 (28 Apr 2024 20:40)  T(F): 97.8 (28 Apr 2024 21:34), Max: 98.4 (28 Apr 2024 20:40)  HR: 64 (28 Apr 2024 21:46) (56 - 69)  BP: 149/60 (28 Apr 2024 21:46) (128/63 - 220/95)  BP(mean): 85 (28 Apr 2024 21:46) (85 - 85)  RR: 18 (28 Apr 2024 21:46) (16 - 18)  SpO2: 95% (28 Apr 2024 21:46) (92% - 97%)    Parameters below as of 28 Apr 2024 21:46  Patient On (Oxygen Delivery Method): nasal cannula  O2 Flow (L/min): 2        PHYSICAL EXAM:  GENERAL: NAD, well-groomed  HEAD:  Atraumatic, normocephalic  HERMANN COMA SCORE: E4- V5- M6- = 15  MENTAL STATUS: AAO x3  CRANIAL NERVES:  EOMI smile bilateral tongue midline SILT V123 b/l hearing ok head turn shoulder shrug ok  No drift  M5/5 UE LE b/l

## 2024-04-28 NOTE — H&P ADULT - ASSESSMENT
PLAN:   84 y/o female with PMHx HTN and A Fib (on Eliquis) BIBA from home due to headache. Found to have Large IVH with extension into 3rd ventricle. Transferred to [facility] for further treatment.     Neuro:  - Q1 hour Neuro checks, Q1 hour Vitals  - Low threshold for EVD placement  - potential Angiogram 4/29 to r/o vascular abnormalities  - S/p Adnexxa to reverse Eliquis use.   - HOB 30 degrees, Neck midline position  - Maintain normothermia, PO acetaminophen for temp>38 C or pain  - repeat CTH now and  in 6hrs  or sooner if neurological decline.   	  CV:  - SBP Goal   - on nicardipine to maintain SBP goals.   - PRN: hydralazine|labetolol   Pulm:  - Supplemental O2 PRN to maintain Spo2>92%  GI:  - NPO	  Gu:  - NS@75   - Monitor Electrolytes & Renal Function  Heme:  - Monitor H&H,  - Type and screen, PT, INR, APTT   - DVT ppx: SCDs  - pending  LE duplex 	  ID:  - Monitor WBC and Temperature  - MRSA swab   Endo  - monitor glucose.

## 2024-04-28 NOTE — H&P ADULT - HISTORY OF PRESENT ILLNESS
82 y/o female with PMHx HTN and A Fib (started taking  Eliquisx1 week ago, last dose 4/28 AM) BIBA from home due to headache. pt reports her blood pressure was elevated earlier today but could not recall the number. According to EMS< BP around 206 systolic. pt currently on Carvedilol. pt unable to recall other bp meds. pt denies cp, sob, visual changes, slurred speech, fall/head injury, numbness/weakness, or any other complaints. ICH 3      NIH Stroke Scale:   · Intubated	No   · Sedated/Unresponsive	No   · NIH Stroke Scale: LOC	(0) Alert; keenly responsive   · NIH Stroke Scale: LOC Question	(0) Answers both questions correctly   · NIH Stroke Scale: LOC Command	(0) Performs both tasks correctly   · NIH Stroke Scale: Gaze	(0) Normal   · NIH Stroke Scale: Visual	(0) No visual loss   · NIH Stroke Scale: Facial	(0) Normal symmetrical movements   · NIH Stroke Scale: Arm Left	(0) No drift; limb holds 90 (or 45) degrees for full 10 secs   · NIH Stroke Scale: Arm Right	(0) No drift; limb holds 90 (or 45) degrees for full 10 secs   · NIH Stroke Scale: Leg Left	(0) No drift; leg holds 30 degree position for full 5 secs   · NIH Stroke Scale: Leg Right	(0) No drift; leg holds 30 degree position for full 5 secs   · NIH Stroke Scale: Ataxia	(0) Absent   · NIH Stroke Scale: Sensory	(0) Normal; no sensory loss   · NIH Stroke Scale: Language	(0) No aphasia; normal   · NIH Stroke Scale: Dysarthria	(0) Normal   · NIH Stroke Scale: Extinct Inattention	(0) No abnormality   · NIH Stroke Scale: Total	0

## 2024-04-28 NOTE — H&P ADULT - NSHPPHYSICALEXAM_GEN_ALL_CORE
NeuroExam:     PHYSICAL EXAM:  General: No Acute Distress   Neurological: Awake, alert & oriented to person, place and time, Following Commands, PERRL, EOMI, Visual fields intact, Face Symmetric, Speech Fluent, Moving all extremities   Muscle Strength: RUE: 5/5 LUE: 5/5 RLE: 5/5 LLE: 5/5  No Drift   Sensation Intact to Light Touch   Cerebellar: There is no dysmetria on finger to nose testing.  Gait : deferred  Pulmonary: non labored breathing, no use of accessory muscles  Cardiovascular:  Regular Rate and Rhythm   Gastrointestinal: Soft, Nontender, Nondistended

## 2024-04-28 NOTE — ED ADULT TRIAGE NOTE - NS ED NURSE AMBULANCES
History reviewed. No pertinent past medical history.    History reviewed. No pertinent surgical history.    Current Outpatient Prescriptions   Medication Sig    biotin 1 mg Cap Take 10,000 mcg by mouth.    calcium carbonate-vitamin D3 500 mg(1,250mg) -125 unit per tablet Take 1,500 mg by mouth.    hydrocodone-acetaminophen 10-325mg (NORCO)  mg Tab Take by mouth.    oxybutynin (DITROPAN) 5 MG Tab Take 5 mg by mouth.    pseudoephedrine (SUDAFED) 30 MG tablet Take 30 mg by mouth every 4 (four) hours as needed for Congestion.    tramadol (ULTRAM) 50 mg tablet Take 50 mg by mouth every 6 (six) hours as needed for Pain.    venlafaxine (EFFEXOR-XR) 75 MG 24 hr capsule Take 75 mg by mouth once daily.    vitamin D 1000 units Tab Take 5,000 Units by mouth.     No current facility-administered medications for this visit.        Review of patient's allergies indicates:   Allergen Reactions    Metrizamide Hives    Morphine Other (See Comments)     Took two days to recover    Oxycodone-acetaminophen Other (See Comments)     CONSTIPATION       History reviewed. No pertinent family history.    Social History     Social History    Marital status:      Spouse name: N/A    Number of children: N/A    Years of education: N/A     Occupational History    Not on file.     Social History Main Topics    Smoking status: Never Smoker    Smokeless tobacco: Never Used    Alcohol use No    Drug use: No    Sexual activity: Not on file     Other Topics Concern    Not on file     Social History Narrative    No narrative on file       Chief Complaint:   Chief Complaint   Patient presents with    Wrist Injury     closed fx distal end of LEFT radius 7/14/17       Consulting Physician: No ref. provider found    History of present illness:    This is a 72 y.o. year old female who complains of wrist pain following a fall on 7/14/17. She puts her pain today at a 0 out of 10.    Review of Systems:    Constitution: Denies  "chills, fever, and sweats.  HENT: Denies headaches or blurry vision.  Cardiovascular: Denies chest pain or irregular heart beat.  Respiratory: Denies cough or shortness of breath.  Gastrointestinal: Denies abdominal pain, nausea, or vomiting.  Musculoskeletal:  Denies muscle cramps.  Neurological: Denies dizziness or focal weakness.  Psychiatric/Behavioral: Normal mental status.  Hematologic/Lymphatic: Denies bleeding problem or easy bruising/bleeding.  Skin: Denies rash or suspicious lesions.    Examination:    Vital Signs:    Vitals:    10/02/17 1036   BP: 137/73   Pulse: 60   Weight: 59.4 kg (130 lb 15.3 oz)   Height: 5' 1" (1.549 m)   PainSc: 0-No pain   PainLoc: Wrist       Body mass index is 24.74 kg/m².    This a well-developed, well nourished patient in no acute distress.    Alert and oriented and cooperative to examination.       Physical Exam: Left Wrist Exam in cast    Skin  Scars:   None  Rash:   None    Inspection  Erythema:  None  Bruising:  none  Swelling:  none  Masses  None  Lymphadenopathy: None    Coordination:  Normal  Instability:  None    Range of Motion: Normal    Strength:  Normal    Tenderness:  none    Pulse:   2+ radial  Sensation:  Intact          Imaging: X-rays ordered and reviewed today of the left foot show a minimally displaced left distal radius fracture with acceptable alignment.        Assessment: Closed fracture of distal end of left radius with routine healing, unspecified fracture morphology, subsequent encounter        Plan: D/c wrist immobilizer today.  PT and HEP helpful.  Will follow up as needed.    DISCLAIMER: This note may have been dictated using voice recognition software and may contain grammatical errors.     NOTE: Consult report sent to referring provider via EPIC EMR.  " [facility]

## 2024-04-28 NOTE — CONSULT NOTE ADULT - ASSESSMENT
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

## 2024-04-28 NOTE — ED ADULT TRIAGE NOTE - IDEAL BODY WEIGHT(KG)
Alert-The patient is alert, awake and responds to voice. The patient is oriented to time, place, and person. The triage nurse is able to obtain subjective information.
57

## 2024-04-28 NOTE — ED PROVIDER NOTE - CARE PLAN
1 Principal Discharge DX:	Brain bleed   Principal Discharge DX:	Intraparenchymal hemorrhage of brain

## 2024-04-28 NOTE — ED ADULT TRIAGE NOTE - CHIEF COMPLAINT QUOTE
per ems from home with headache all day today, nausea, and high blood pressure. pt takes her bp medications as prescribed

## 2024-04-28 NOTE — ED ADULT NURSE REASSESSMENT NOTE - NS ED NURSE REASSESS COMMENT FT1
Pt headache relieved at this time.  Nicardipine drip at 5g/hr maintained- pt tolerating well.  Received Zofran, nausea resolved at this time.  Pt remains neurologically intact.  Pending transfer to [place].  Maintain comfort.

## 2024-04-28 NOTE — ED ADULT NURSE NOTE - NSFALLRISKFACTORS_ED_ALL_ED
eliquis/Age: 85 years old or older/Coagulation: Bleeding disorder either through use of anticoagulants or underlying clinical condition(s)

## 2024-04-29 ENCOUNTER — TRANSCRIPTION ENCOUNTER (OUTPATIENT)
Age: 84
End: 2024-04-29

## 2024-04-29 LAB
A1C WITH ESTIMATED AVERAGE GLUCOSE RESULT: 5.3 % — SIGNIFICANT CHANGE UP (ref 4–5.6)
ANION GAP SERPL CALC-SCNC: 12 MMOL/L — SIGNIFICANT CHANGE UP (ref 5–17)
ANION GAP SERPL CALC-SCNC: 12 MMOL/L — SIGNIFICANT CHANGE UP (ref 5–17)
ANION GAP SERPL CALC-SCNC: 14 MMOL/L — SIGNIFICANT CHANGE UP (ref 5–17)
APTT BLD: 27.9 SEC — SIGNIFICANT CHANGE UP (ref 24.5–35.6)
BLD GP AB SCN SERPL QL: SIGNIFICANT CHANGE UP
BUN SERPL-MCNC: 16.2 MG/DL — SIGNIFICANT CHANGE UP (ref 8–20)
BUN SERPL-MCNC: 17.7 MG/DL — SIGNIFICANT CHANGE UP (ref 8–20)
BUN SERPL-MCNC: 19.2 MG/DL — SIGNIFICANT CHANGE UP (ref 8–20)
CALCIUM SERPL-MCNC: 8 MG/DL — LOW (ref 8.4–10.5)
CALCIUM SERPL-MCNC: 8.4 MG/DL — SIGNIFICANT CHANGE UP (ref 8.4–10.5)
CALCIUM SERPL-MCNC: 8.6 MG/DL — SIGNIFICANT CHANGE UP (ref 8.4–10.5)
CHLORIDE SERPL-SCNC: 96 MMOL/L — SIGNIFICANT CHANGE UP (ref 96–108)
CHLORIDE SERPL-SCNC: 98 MMOL/L — SIGNIFICANT CHANGE UP (ref 96–108)
CHLORIDE SERPL-SCNC: 98 MMOL/L — SIGNIFICANT CHANGE UP (ref 96–108)
CHOLEST SERPL-MCNC: 151 MG/DL — SIGNIFICANT CHANGE UP
CO2 SERPL-SCNC: 20 MMOL/L — LOW (ref 22–29)
CO2 SERPL-SCNC: 22 MMOL/L — SIGNIFICANT CHANGE UP (ref 22–29)
CO2 SERPL-SCNC: 23 MMOL/L — SIGNIFICANT CHANGE UP (ref 22–29)
CREAT SERPL-MCNC: 0.41 MG/DL — LOW (ref 0.5–1.3)
CREAT SERPL-MCNC: 0.42 MG/DL — LOW (ref 0.5–1.3)
CREAT SERPL-MCNC: 0.42 MG/DL — LOW (ref 0.5–1.3)
EGFR: 96 ML/MIN/1.73M2 — SIGNIFICANT CHANGE UP
ESTIMATED AVERAGE GLUCOSE: 105 MG/DL — SIGNIFICANT CHANGE UP (ref 68–114)
GLUCOSE SERPL-MCNC: 103 MG/DL — HIGH (ref 70–99)
GLUCOSE SERPL-MCNC: 146 MG/DL — HIGH (ref 70–99)
GLUCOSE SERPL-MCNC: 97 MG/DL — SIGNIFICANT CHANGE UP (ref 70–99)
HDLC SERPL-MCNC: 79 MG/DL — SIGNIFICANT CHANGE UP
INR BLD: 1.25 RATIO — HIGH (ref 0.85–1.18)
LIPID PNL WITH DIRECT LDL SERPL: 60 MG/DL — SIGNIFICANT CHANGE UP
MAGNESIUM SERPL-MCNC: 1.7 MG/DL — LOW (ref 1.8–2.6)
MRSA PCR RESULT.: SIGNIFICANT CHANGE UP
NON HDL CHOLESTEROL: 72 MG/DL — SIGNIFICANT CHANGE UP
PHOSPHATE SERPL-MCNC: 2.3 MG/DL — LOW (ref 2.4–4.7)
POTASSIUM SERPL-MCNC: 3.6 MMOL/L — SIGNIFICANT CHANGE UP (ref 3.5–5.3)
POTASSIUM SERPL-MCNC: 3.7 MMOL/L — SIGNIFICANT CHANGE UP (ref 3.5–5.3)
POTASSIUM SERPL-MCNC: 4.2 MMOL/L — SIGNIFICANT CHANGE UP (ref 3.5–5.3)
POTASSIUM SERPL-SCNC: 3.6 MMOL/L — SIGNIFICANT CHANGE UP (ref 3.5–5.3)
POTASSIUM SERPL-SCNC: 3.7 MMOL/L — SIGNIFICANT CHANGE UP (ref 3.5–5.3)
POTASSIUM SERPL-SCNC: 4.2 MMOL/L — SIGNIFICANT CHANGE UP (ref 3.5–5.3)
PROTHROM AB SERPL-ACNC: 13.8 SEC — HIGH (ref 9.5–13)
S AUREUS DNA NOSE QL NAA+PROBE: SIGNIFICANT CHANGE UP
SODIUM SERPL-SCNC: 130 MMOL/L — LOW (ref 135–145)
SODIUM SERPL-SCNC: 131 MMOL/L — LOW (ref 135–145)
SODIUM SERPL-SCNC: 132 MMOL/L — LOW (ref 135–145)
TRIGL SERPL-MCNC: 62 MG/DL — SIGNIFICANT CHANGE UP
TSH SERPL-MCNC: 1.24 UIU/ML — SIGNIFICANT CHANGE UP (ref 0.27–4.2)

## 2024-04-29 PROCEDURE — 70450 CT HEAD/BRAIN W/O DYE: CPT | Mod: 26

## 2024-04-29 PROCEDURE — 99232 SBSQ HOSP IP/OBS MODERATE 35: CPT | Mod: FS

## 2024-04-29 PROCEDURE — 99291 CRITICAL CARE FIRST HOUR: CPT

## 2024-04-29 PROCEDURE — 93010 ELECTROCARDIOGRAM REPORT: CPT

## 2024-04-29 PROCEDURE — 93970 EXTREMITY STUDY: CPT | Mod: 26

## 2024-04-29 RX ORDER — SODIUM CHLORIDE 9 MG/ML
1000 INJECTION INTRAMUSCULAR; INTRAVENOUS; SUBCUTANEOUS
Refills: 0 | Status: DISCONTINUED | OUTPATIENT
Start: 2024-04-29 | End: 2024-04-29

## 2024-04-29 RX ORDER — ANASTROZOLE 1 MG/1
1 TABLET ORAL
Refills: 0 | DISCHARGE

## 2024-04-29 RX ORDER — POTASSIUM CHLORIDE 20 MEQ
10 PACKET (EA) ORAL ONCE
Refills: 0 | Status: DISCONTINUED | OUTPATIENT
Start: 2024-04-29 | End: 2024-04-29

## 2024-04-29 RX ORDER — LEVOTHYROXINE SODIUM 125 MCG
75 TABLET ORAL DAILY
Refills: 0 | Status: DISCONTINUED | OUTPATIENT
Start: 2024-04-29 | End: 2024-05-13

## 2024-04-29 RX ORDER — ONDANSETRON 8 MG/1
4 TABLET, FILM COATED ORAL ONCE
Refills: 0 | Status: COMPLETED | OUTPATIENT
Start: 2024-04-29 | End: 2024-04-29

## 2024-04-29 RX ORDER — SODIUM CHLORIDE 5 G/100ML
1000 INJECTION, SOLUTION INTRAVENOUS
Refills: 0 | Status: DISCONTINUED | OUTPATIENT
Start: 2024-04-29 | End: 2024-04-29

## 2024-04-29 RX ORDER — LATANOPROST 0.05 MG/ML
1 SOLUTION/ DROPS OPHTHALMIC; TOPICAL AT BEDTIME
Refills: 0 | Status: DISCONTINUED | OUTPATIENT
Start: 2024-04-29 | End: 2024-05-13

## 2024-04-29 RX ORDER — IRBESARTAN 75 MG/1
1 TABLET ORAL
Refills: 0 | DISCHARGE

## 2024-04-29 RX ORDER — ACETAMINOPHEN 500 MG
650 TABLET ORAL ONCE
Refills: 0 | Status: COMPLETED | OUTPATIENT
Start: 2024-04-29 | End: 2024-04-29

## 2024-04-29 RX ORDER — VALSARTAN 80 MG/1
40 TABLET ORAL DAILY
Refills: 0 | Status: DISCONTINUED | OUTPATIENT
Start: 2024-04-29 | End: 2024-05-01

## 2024-04-29 RX ORDER — LEVOTHYROXINE SODIUM 125 MCG
1 TABLET ORAL
Refills: 0 | DISCHARGE

## 2024-04-29 RX ORDER — INFLUENZA VIRUS VACCINE 15; 15; 15; 15 UG/.5ML; UG/.5ML; UG/.5ML; UG/.5ML
0.7 SUSPENSION INTRAMUSCULAR ONCE
Refills: 0 | Status: DISCONTINUED | OUTPATIENT
Start: 2024-04-29 | End: 2024-05-13

## 2024-04-29 RX ORDER — CARVEDILOL PHOSPHATE 80 MG/1
25 CAPSULE, EXTENDED RELEASE ORAL EVERY 12 HOURS
Refills: 0 | Status: DISCONTINUED | OUTPATIENT
Start: 2024-04-29 | End: 2024-04-30

## 2024-04-29 RX ORDER — ANASTROZOLE 1 MG/1
1 TABLET ORAL DAILY
Refills: 0 | Status: DISCONTINUED | OUTPATIENT
Start: 2024-04-29 | End: 2024-05-13

## 2024-04-29 RX ORDER — POTASSIUM CHLORIDE 20 MEQ
10 PACKET (EA) ORAL ONCE
Refills: 0 | Status: COMPLETED | OUTPATIENT
Start: 2024-04-29 | End: 2024-04-29

## 2024-04-29 RX ORDER — MAGNESIUM SULFATE 500 MG/ML
2 VIAL (ML) INJECTION ONCE
Refills: 0 | Status: COMPLETED | OUTPATIENT
Start: 2024-04-29 | End: 2024-04-29

## 2024-04-29 RX ORDER — SODIUM CHLORIDE 5 G/100ML
1000 INJECTION, SOLUTION INTRAVENOUS
Refills: 0 | Status: DISCONTINUED | OUTPATIENT
Start: 2024-04-29 | End: 2024-04-30

## 2024-04-29 RX ORDER — POTASSIUM CHLORIDE 20 MEQ
40 PACKET (EA) ORAL ONCE
Refills: 0 | Status: COMPLETED | OUTPATIENT
Start: 2024-04-29 | End: 2024-04-30

## 2024-04-29 RX ORDER — POTASSIUM PHOSPHATE, MONOBASIC POTASSIUM PHOSPHATE, DIBASIC 236; 224 MG/ML; MG/ML
15 INJECTION, SOLUTION INTRAVENOUS ONCE
Refills: 0 | Status: COMPLETED | OUTPATIENT
Start: 2024-04-29 | End: 2024-04-29

## 2024-04-29 RX ORDER — LATANOPROST 0.05 MG/ML
1 SOLUTION/ DROPS OPHTHALMIC; TOPICAL
Refills: 0 | DISCHARGE

## 2024-04-29 RX ADMIN — NICARDIPINE HYDROCHLORIDE 25 MG/HR: 30 CAPSULE, EXTENDED RELEASE ORAL at 06:17

## 2024-04-29 RX ADMIN — Medication 100 MILLIEQUIVALENT(S): at 02:13

## 2024-04-29 RX ADMIN — Medication 100 MILLIEQUIVALENT(S): at 00:02

## 2024-04-29 RX ADMIN — VALSARTAN 40 MILLIGRAM(S): 80 TABLET ORAL at 12:14

## 2024-04-29 RX ADMIN — Medication 100 MILLIEQUIVALENT(S): at 08:01

## 2024-04-29 RX ADMIN — POTASSIUM PHOSPHATE, MONOBASIC POTASSIUM PHOSPHATE, DIBASIC 62.5 MILLIMOLE(S): 236; 224 INJECTION, SOLUTION INTRAVENOUS at 07:47

## 2024-04-29 RX ADMIN — Medication 650 MILLIGRAM(S): at 14:01

## 2024-04-29 RX ADMIN — NICARDIPINE HYDROCHLORIDE 25 MG/HR: 30 CAPSULE, EXTENDED RELEASE ORAL at 22:47

## 2024-04-29 RX ADMIN — Medication 100 MILLIEQUIVALENT(S): at 01:08

## 2024-04-29 RX ADMIN — Medication 650 MILLIGRAM(S): at 15:00

## 2024-04-29 RX ADMIN — ONDANSETRON 4 MILLIGRAM(S): 8 TABLET, FILM COATED ORAL at 10:29

## 2024-04-29 RX ADMIN — CARVEDILOL PHOSPHATE 25 MILLIGRAM(S): 80 CAPSULE, EXTENDED RELEASE ORAL at 23:28

## 2024-04-29 RX ADMIN — SODIUM CHLORIDE 30 MILLILITER(S): 5 INJECTION, SOLUTION INTRAVENOUS at 02:17

## 2024-04-29 RX ADMIN — LATANOPROST 1 DROP(S): 0.05 SOLUTION/ DROPS OPHTHALMIC; TOPICAL at 22:47

## 2024-04-29 RX ADMIN — NICARDIPINE HYDROCHLORIDE 25 MG/HR: 30 CAPSULE, EXTENDED RELEASE ORAL at 07:47

## 2024-04-29 RX ADMIN — Medication 25 GRAM(S): at 07:47

## 2024-04-29 RX ADMIN — CHLORHEXIDINE GLUCONATE 1 APPLICATION(S): 213 SOLUTION TOPICAL at 12:15

## 2024-04-29 NOTE — OCCUPATIONAL THERAPY INITIAL EVALUATION ADULT - GENERAL OBSERVATIONS, REHAB EVAL
Received pt semifowler in bed, NAD, +IV, +Tele//BP, +2LO2NC, +b/l VCB, +primafit +bed alarm A&Ox2-3 with choices. Pre/post pain 4/10, "head pain"; RN aware. Pt agreeable to OT evaluation

## 2024-04-29 NOTE — OCCUPATIONAL THERAPY INITIAL EVALUATION ADULT - IMPAIRED TRANSFERS: SIT/STAND, REHAB EVAL
pt +dizzy and nauseous with transfer and mobility, vitals stable, left in chair with RN bedside and aware./impaired balance/impaired coordination/impaired motor control/decreased strength

## 2024-04-29 NOTE — DISCHARGE NOTE NURSING/CASE MANAGEMENT/SOCIAL WORK - NSDCPETBCESMAN_GEN_ALL_CORE
Department of Anesthesiology  Preprocedure Note       Name:  Chad Morgan   Age:  1 y.o.  :  2020                                          MRN:  [id number]         Date:  8/3/2021      Surgeon: Mita Rosas): Hossein Wynn MD    Procedure: Procedure(s):  REMOVAL OF FOREIGN BODY NOSE, 30 MINS, PAT ON ADMIT/ RAPID COVID ON ADMIT, TO BE LATEX FREE (DR. Lozano Friend TO COVER H&P)    Medications prior to admission:   Prior to Admission medications    Medication Sig Start Date End Date Taking? 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 Past Surgical History:  No past surgical history on file.     Social History:    Social History     Tobacco Use    Smoking status: Never Smoker    Smokeless tobacco: Never Used   Substance Use Topics    Alcohol use: Not on file                                Counseling given: Not Answered      Vital Signs (Current):   Vitals:    21 0615 21 0627   Pulse: 101    Resp: 22    Temp: 97.2 °F (36.2 °C)    TempSrc: Temporal    SpO2: 99%    Weight: 34 lb (15.4 kg) 34 lb (15.4 kg)   Height: 38\" (96.5 cm) 38\" (96.5 cm)                                              BP Readings from Last 3 Encounters:   20 94/60 (78 %, Z = 0.77 /  95 %, Z = 1.60)*     *BP percentiles are based on the 2017 AAP Clinical Practice Guideline for girls       NPO Status: Time of last liquid consumption:                         Time of last solid If you are a smoker, it is important for your health to stop smoking. Please be aware that second hand smoke is also harmful. consumption: 2000                        Date of last liquid consumption: 08/02/21                        Date of last solid food consumption: 08/02/21    BMI:   Wt Readings from Last 3 Encounters:   08/03/21 34 lb (15.4 kg) (67 %, Z= 0.44)*   10/06/20 29 lb 9.6 oz (13.4 kg) (59 %, Z= 0.23)*   02/05/20 25 lb (11.3 kg) (54 %, Z= 0.09)     * Growth percentiles are based on CDC (Girls, 2-20 Years) data.  Growth percentiles are based on WHO (Girls, 0-2 years) data. Body mass index is 16.55 kg/m². CBC: No results found for: WBC, RBC, HGB, HCT, MCV, RDW, PLT    CMP: No results found for: NA, K, CL, CO2, BUN, CREATININE, GFRAA, AGRATIO, LABGLOM, GLUCOSE, PROT, CALCIUM, BILITOT, ALKPHOS, AST, ALT    POC Tests: No results for input(s): POCGLU, POCNA, POCK, POCCL, POCBUN, POCHEMO, POCHCT in the last 72 hours. Coags: No results found for: PROTIME, INR, APTT    HCG (If Applicable): No results found for: PREGTESTUR, PREGSERUM, HCG, HCGQUANT     ABGs: No results found for: PHART, PO2ART, XCI0EAI, LWK8MLK, BEART, M0CBRCTD     Type & Screen (If Applicable):  No results found for: LABABO, LABRH    Drug/Infectious Status (If Applicable):  No results found for: HIV, HEPCAB    COVID-19 Screening (If Applicable):   Lab Results   Component Value Date    COVID19 Not Detected 10/06/2020           Anesthesia Evaluation  Patient summary reviewed and Nursing notes reviewed no history of anesthetic complications:   Airway: Mallampati: Unable to assess / NA        Dental: normal exam         Pulmonary:Negative Pulmonary ROS and normal exam                               Cardiovascular:Negative CV ROS  Exercise tolerance: good (>4 METS),         ECG reviewed               Beta Blocker:  Not on Beta Blocker         Neuro/Psych:   Negative Neuro/Psych ROS              GI/Hepatic/Renal: Neg GI/Hepatic/Renal ROS            Endo/Other: Negative Endo/Other ROS             Pt had PAT visit.        Abdominal:             Vascular: negative vascular ROS. Other Findings: Normal exam for age            Anesthesia Plan      general     ASA 1       Induction: inhalational.      Anesthetic plan and risks discussed with father. Plan discussed with CRNA and attending.     Attending anesthesiologist reviewed and agrees with Preprocedure content              Lilibeth Sim MD   8/3/2021

## 2024-04-29 NOTE — PHARMACOTHERAPY INTERVENTION NOTE - COMMENTS
Used Surescripts and spoke with patient at bedside in regards to home medication list. Patient reports stopped aspirin when placed on eliquis

## 2024-04-29 NOTE — DISCHARGE NOTE NURSING/CASE MANAGEMENT/SOCIAL WORK - NSDCPEFALRISK_GEN_ALL_CORE
For information on Fall & Injury Prevention, visit: [email or web address] OR  [email or web address] OR  https://www.cdc.gov/steadi/patient.html

## 2024-04-29 NOTE — OCCUPATIONAL THERAPY INITIAL EVALUATION ADULT - DIAGNOSIS, OT EVAL
83 year old Female with PMHx HTN and A Fib (on Eliquis) BIBA from home due to headache. 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 Transferred to [facility] from [place] for further treatment.

## 2024-04-29 NOTE — PHYSICAL THERAPY INITIAL EVALUATION ADULT - GENERAL OBSERVATIONS, REHAB EVAL
Pt received in bed, + IV + tele//BP monitoring, +2LO2NC, +b/l VCB, +primafit, in 0/10 pain, breathing on RA in NAD,

## 2024-04-29 NOTE — PROGRESS NOTE ADULT - ASSESSMENT
83F PMH Afib (on Eliquis), HTN with L Caudate ICH with significant IVH sp reversal    PLAN  - Stability CTH at 8p  - Hydrocephalus watch  - HOLD AC  - SBP <150 on Cardene  - Start Valsartan for HTN, Hold home carvedilol given bradycardia  - Metoprolol for Afib rate control if needed  - SpO2 > 94% on RA  - NPO for now, advance diet if 24h CTH stable  - Na goal normal, 2% for hypovolemic hyponatremia  - DVT Ppx: SCD, SQL after 24h CTH    My full attention was spent providing medically necessary critical care to the patient with details documented in my note above.   Critical care time spent examining patient, reviewing vitals, labs, medications, imaging and discussing with the team goals of care   The combined critical care time provided to the patient was 60 minutes  This time does not include bedside procedures that are documented separately.

## 2024-04-29 NOTE — PROGRESS NOTE ADULT - ASSESSMENT
PLAN:   82 y/o female with PMHx HTN and A Fib (on Eliquis) BIBA from home due to headache. Found to have Large IVH with extension into 3rd ventricle. Transferred to [facility] for further treatment.     Neuro:  - Q1 hour Neuro checks, Q1 hour Vitals  - Low threshold for EVD placement  - potential Angiogram 4/29 to r/o vascular abnormalities  - S/p Adnexxa to reverse Eliquis use.   - repeat CTH if neurological decline.   -SBP: , on nicardipine to maintain   - NPO at this time.   -normonatremic, 2% @ 30,   - Monitor H&H,  - hold chemical DVT prophylaxis given acute hemorrhage.   - DVT ppx: SCDs  - pending  LE duplex

## 2024-04-29 NOTE — OCCUPATIONAL THERAPY INITIAL EVALUATION ADULT - LIVES WITH, PROFILE
Pt lives alone in a house with 4-5 DAVID no handrail, 7 steps inside with handrail to bed/bath. Pt states brother can probably come stay with her to assist (From [place]) and also has a daughter in law that can assist prn./alone

## 2024-04-29 NOTE — DISCHARGE NOTE NURSING/CASE MANAGEMENT/SOCIAL WORK - PATIENT PORTAL LINK FT
You can access the FollowMyHealth Patient Portal offered by [facility] by registering at the following website: [email or web address]. By joining [facility]’s FollowMyHealth portal, you will also be able to view your health information using other applications (apps) compatible with our system.

## 2024-04-29 NOTE — PROGRESS NOTE ADULT - SUBJECTIVE AND OBJECTIVE BOX
HPI:  HPI:   84 y/o female with PMHx HTN and A Fib (started taking  Eliquisx1 week ago, last dose 4/28 AM) BIBA from home due to headache. pt reports her blood pressure was elevated earlier today but could not recall the number. According to EMS< BP around 206 systolic. pt currently on Carvedilol. pt unable to recall other bp meds. pt denies cp, sob, visual changes, slurred speech, fall/head injury, numbness/weakness, or any other complaints. ICH 3   (28 Apr 2024 22:41)    INTERVAL HPI/OVERNIGHT EVENTS:  83y F seen and examined at bedside. Noted to be neurologically intact. Rpeat CTH Stable. Low threshold for EVD placement if neruological decline     Vital Signs Last 24 Hrs  T(C): 36.4 (29 Apr 2024 00:36), Max: 36.9 (28 Apr 2024 20:40)  T(F): 97.6 (29 Apr 2024 00:36), Max: 98.4 (28 Apr 2024 20:40)  HR: 63 (28 Apr 2024 23:42) (56 - 69)  BP: 148/77 (28 Apr 2024 23:42) (128/63 - 220/95)  BP(mean): 98 (28 Apr 2024 23:42) (85 - 98)  RR: 18 (28 Apr 2024 23:42) (15 - 18)  SpO2: 95% (28 Apr 2024 23:42) (92% - 97%)    Parameters below as of 28 Apr 2024 23:42  Patient On (Oxygen Delivery Method): room air    LABS:                        11.3   16.53 )-----------( 266      ( 28 Apr 2024 21:54 )             33.3     04-28    129<L>  |  94<L>  |  21.0<H>  ----------------------------<  122<H>  3.5   |  22.0  |  0.43<L>    Ca    8.4      28 Apr 2024 21:54  Phos  2.1     04-28  Mg     1.8     04-28    TPro  6.1<L>  /  Alb  3.8  /  TBili  0.9  /  DBili  x   /  AST  30  /  ALT  47<H>  /  AlkPhos  63  04-28    PT/INR - ( 28 Apr 2024 21:54 )   PT: 11.4 sec;   INR: 1.03 ratio         PTT - ( 28 Apr 2024 21:54 )  PTT:30.3 sec  Urinalysis Basic - ( 28 Apr 2024 21:54 )    Color: x / Appearance: x / SG: x / pH: x  Gluc: 122 mg/dL / Ketone: x  / Bili: x / Urobili: x   Blood: x / Protein: x / Nitrite: x   Leuk Esterase: x / RBC: x / WBC x   Sq Epi: x / Non Sq Epi: x / Bacteria: x      PHYSICAL EXAM:  General: No Acute Distress   Neurological: Awake, alert & oriented to person, place and time, Following Commands, PERRL, EOMI, Visual fields intact, Face Symmetric, Speech Fluent, Moving all extremities   Muscle Strength: RUE: 5/5 LUE: 5/5 RLE: 5/5 LLE: 5/5  No Drift   Sensation Intact to Light Touch   Cerebellar: There is no dysmetria on finger to nose testing.  Gait : deferred  Pulmonary: non labored breathing, no use of accessory muscles  Cardiovascular:  Regular Rate and Rhythm   Gastrointestinal: Soft, Nontender, Nondistended      RADIOLOGY & ADDITIONAL TESTS:  < from: CT Head No Cont (04.28.24 @ 22:53) >  IMPRESSION:    No change inintraventricular hemorrhage and dilatation of the lateral   and third ventricles compared with prior exam. Ill-defined hemorrhage   again seen in the left caudate body and left caudate head.    < end of copied text >

## 2024-04-29 NOTE — OCCUPATIONAL THERAPY INITIAL EVALUATION ADULT - RANGE OF MOTION EXAMINATION, UPPER EXTREMITY
RUE noted with effortful movement for all, weakness present, shoulder AROM grossly 110 degrees +drift, decreased motor control throughout/bilateral UE Active ROM was WFL  (within functional limits)

## 2024-04-29 NOTE — PATIENT PROFILE ADULT - FALL HARM RISK - HARM RISK INTERVENTIONS
Assistance with ambulation/Communicate Risk of Fall with Harm to all staff/Reinforce activity limits and safety measures with patient and family/Tailored Fall Risk Interventions/Use of alarms - bed, chair and/or voice tab/Visual Cue: Yellow wristband and red socks/Bed in lowest position, wheels locked, appropriate side rails in place/Call bell, personal items and telephone in reach/Instruct patient to call for assistance before getting out of bed or chair/Non-slip footwear when patient is out of bed/[place] to call system/Physically safe environment - no spills, clutter or unnecessary equipment/Purposeful Proactive Rounding/Room/bathroom lighting operational, light cord in reach

## 2024-04-29 NOTE — PHYSICAL THERAPY INITIAL EVALUATION ADULT - PERTINENT HX OF CURRENT PROBLEM, REHAB EVAL
82 y/o female with PMHx HTN and A Fib (on Eliquis) BIBA from home due to headache. Found to have Large IVH with extension into 3rd ventricle. Transferred to [facility] for further treatment.

## 2024-04-29 NOTE — PROGRESS NOTE ADULT - SUBJECTIVE AND OBJECTIVE BOX
Chief complaint:   Patient is a 83y old  Female who presents with a chief complaint of IVH (29 Apr 2024 00:37)    HPI:   84 y/o female with PMHx HTN and A Fib (started taking  Eliquisx1 week ago, last dose 4/28 AM) BIBA from home due to headache. pt reports her blood pressure was elevated earlier today but could not recall the number. According to EMS< BP around 206 systolic. pt currently on Carvedilol. pt unable to recall other bp meds. pt denies cp, sob, visual changes, slurred speech, fall/head injury, numbness/weakness, or any other complaints. ICH 3    24hr EVENTS:  4/28 - Eliquis reversed, Admitted to NSICU    ROS: [ ]  Unable to assess due to mental status   All other systems negative      -----------------------------------------------------------------------------------------------------------------------------------------------------------------------------------    PHYSICAL EXAM:  General: Calm  HEENT: MMM  Neuro:  -Mental status- No acute distress  -CN- PERRL 3mm, EOMI, tongue midline, face symmetric  - Strength & Sensation intact, symmetric    CV: RRR  Pulm: clear to auscultation  Abd: Soft, nontender, nondistended  Ext: no noted edema in lower ext  Skin: warm, dry        -----------------------------------------------------------------------------------------------------------------------------------------------------------------------------------  ICU Vital Signs Last 24 Hrs  T(C): 36.7 (29 Apr 2024 11:26), Max: 36.9 (28 Apr 2024 20:40)  T(F): 98 (29 Apr 2024 11:26), Max: 98.4 (28 Apr 2024 20:40)  HR: 55 (29 Apr 2024 11:00) (51 - 81)  BP: 135/85 (29 Apr 2024 11:00) (121/84 - 220/95)  BP(mean): 100 (29 Apr 2024 11:00) (79 - 103)  ABP: --  ABP(mean): --  RR: 14 (29 Apr 2024 11:00) (12 - 25)  SpO2: 96% (29 Apr 2024 11:00) (91% - 97%)    O2 Parameters below as of 29 Apr 2024 11:00  Patient On (Oxygen Delivery Method): nasal cannula  O2 Flow (L/min): 2          I&O's Summary    28 Apr 2024 07:01  -  29 Apr 2024 07:00  --------------------------------------------------------  IN: 557.5 mL / OUT: 600 mL / NET: -42.5 mL    29 Apr 2024 07:01  -  29 Apr 2024 12:11  --------------------------------------------------------  IN: 540 mL / OUT: 450 mL / NET: 90 mL        MEDICATIONS  (STANDING):  chlorhexidine 2% Cloths 1 Application(s) Topical daily  influenza  Vaccine (HIGH DOSE) 0.7 milliLiter(s) IntraMuscular once  latanoprost 0.005% Ophthalmic Solution 1 Drop(s) Both EYES at bedtime  levothyroxine 75 MICROGram(s) Oral daily  niCARdipine Infusion 5 mG/Hr (25 mL/Hr) IV Continuous <Continuous>  sodium chloride 2% . 1000 milliLiter(s) (50 mL/Hr) IV Continuous <Continuous>  valsartan 40 milliGRAM(s) Oral daily      RESPIRATORY:        IMAGING:   Recent imaging studies were reviewed.    LAB RESULTS:                          11.3   16.53 )-----------( 266      ( 28 Apr 2024 21:54 )             33.3       PT/INR - ( 29 Apr 2024 06:00 )   PT: 13.8 sec;   INR: 1.25 ratio         PTT - ( 29 Apr 2024 06:00 )  PTT:27.9 sec    04-29    130<L>  |  96  |  17.7  ----------------------------<  103<H>  3.6   |  22.0  |  0.42<L>    Ca    8.4      29 Apr 2024 06:00  Phos  2.3     04-29  Mg     1.7     04-29    TPro  6.1<L>  /  Alb  3.8  /  TBili  0.9  /  DBili  x   /  AST  30  /  ALT  47<H>  /  AlkPhos  63  04-28

## 2024-04-29 NOTE — OCCUPATIONAL THERAPY INITIAL EVALUATION ADULT - ADDITIONAL COMMENTS
Pt has a tub with curtains and no grab bars. Pt was independent PTA without device but admits to furniture surfing past 2 days for stabilization. Pt owns a RW, cane, shower chair and commode. Pt R handed and drives. Pt is not on home O2. Pt states can have rail installed on outside steps if needed.

## 2024-04-30 ENCOUNTER — RESULT REVIEW (OUTPATIENT)
Age: 84
End: 2024-04-30

## 2024-04-30 LAB
ANION GAP SERPL CALC-SCNC: 11 MMOL/L — SIGNIFICANT CHANGE UP (ref 5–17)
ANION GAP SERPL CALC-SCNC: 12 MMOL/L — SIGNIFICANT CHANGE UP (ref 5–17)
ANION GAP SERPL CALC-SCNC: 12 MMOL/L — SIGNIFICANT CHANGE UP (ref 5–17)
BUN SERPL-MCNC: 19.5 MG/DL — SIGNIFICANT CHANGE UP (ref 8–20)
BUN SERPL-MCNC: 23.7 MG/DL — HIGH (ref 8–20)
BUN SERPL-MCNC: 29.7 MG/DL — HIGH (ref 8–20)
CALCIUM SERPL-MCNC: 7.8 MG/DL — LOW (ref 8.4–10.5)
CALCIUM SERPL-MCNC: 8.3 MG/DL — LOW (ref 8.4–10.5)
CALCIUM SERPL-MCNC: 8.4 MG/DL — SIGNIFICANT CHANGE UP (ref 8.4–10.5)
CHLORIDE SERPL-SCNC: 104 MMOL/L — SIGNIFICANT CHANGE UP (ref 96–108)
CHLORIDE SERPL-SCNC: 106 MMOL/L — SIGNIFICANT CHANGE UP (ref 96–108)
CHLORIDE SERPL-SCNC: 99 MMOL/L — SIGNIFICANT CHANGE UP (ref 96–108)
CO2 SERPL-SCNC: 21 MMOL/L — LOW (ref 22–29)
CO2 SERPL-SCNC: 22 MMOL/L — SIGNIFICANT CHANGE UP (ref 22–29)
CO2 SERPL-SCNC: 22 MMOL/L — SIGNIFICANT CHANGE UP (ref 22–29)
CREAT SERPL-MCNC: 0.37 MG/DL — LOW (ref 0.5–1.3)
CREAT SERPL-MCNC: 0.54 MG/DL — SIGNIFICANT CHANGE UP (ref 0.5–1.3)
CREAT SERPL-MCNC: 0.63 MG/DL — SIGNIFICANT CHANGE UP (ref 0.5–1.3)
EGFR: 87 ML/MIN/1.73M2 — SIGNIFICANT CHANGE UP
EGFR: 90 ML/MIN/1.73M2 — SIGNIFICANT CHANGE UP
EGFR: 99 ML/MIN/1.73M2 — SIGNIFICANT CHANGE UP
GLUCOSE SERPL-MCNC: 120 MG/DL — HIGH (ref 70–99)
GLUCOSE SERPL-MCNC: 134 MG/DL — HIGH (ref 70–99)
GLUCOSE SERPL-MCNC: 142 MG/DL — HIGH (ref 70–99)
HCT VFR BLD CALC: 32.3 % — LOW (ref 34.5–45)
HGB BLD-MCNC: 11.2 G/DL — LOW (ref 11.5–15.5)
MAGNESIUM SERPL-MCNC: 2 MG/DL — SIGNIFICANT CHANGE UP (ref 1.6–2.6)
MCHC RBC-ENTMCNC: 30.1 PG — SIGNIFICANT CHANGE UP (ref 27–34)
MCHC RBC-ENTMCNC: 34.7 GM/DL — SIGNIFICANT CHANGE UP (ref 32–36)
MCV RBC AUTO: 86.8 FL — SIGNIFICANT CHANGE UP (ref 80–100)
PHOSPHATE SERPL-MCNC: 2.7 MG/DL — SIGNIFICANT CHANGE UP (ref 2.4–4.7)
PLATELET # BLD AUTO: 238 K/UL — SIGNIFICANT CHANGE UP (ref 150–400)
POTASSIUM SERPL-MCNC: 3.8 MMOL/L — SIGNIFICANT CHANGE UP (ref 3.5–5.3)
POTASSIUM SERPL-MCNC: 3.8 MMOL/L — SIGNIFICANT CHANGE UP (ref 3.5–5.3)
POTASSIUM SERPL-MCNC: 3.9 MMOL/L — SIGNIFICANT CHANGE UP (ref 3.5–5.3)
POTASSIUM SERPL-SCNC: 3.8 MMOL/L — SIGNIFICANT CHANGE UP (ref 3.5–5.3)
POTASSIUM SERPL-SCNC: 3.8 MMOL/L — SIGNIFICANT CHANGE UP (ref 3.5–5.3)
POTASSIUM SERPL-SCNC: 3.9 MMOL/L — SIGNIFICANT CHANGE UP (ref 3.5–5.3)
RBC # BLD: 3.72 M/UL — LOW (ref 3.8–5.2)
RBC # FLD: 13.2 % — SIGNIFICANT CHANGE UP (ref 10.3–14.5)
SODIUM SERPL-SCNC: 132 MMOL/L — LOW (ref 135–145)
SODIUM SERPL-SCNC: 138 MMOL/L — SIGNIFICANT CHANGE UP (ref 135–145)
SODIUM SERPL-SCNC: 139 MMOL/L — SIGNIFICANT CHANGE UP (ref 135–145)
WBC # BLD: 8.19 K/UL — SIGNIFICANT CHANGE UP (ref 3.8–10.5)
WBC # FLD AUTO: 8.19 K/UL — SIGNIFICANT CHANGE UP (ref 3.8–10.5)

## 2024-04-30 PROCEDURE — 99223 1ST HOSP IP/OBS HIGH 75: CPT

## 2024-04-30 PROCEDURE — 99232 SBSQ HOSP IP/OBS MODERATE 35: CPT | Mod: FS

## 2024-04-30 PROCEDURE — 93308 TTE F-UP OR LMTD: CPT | Mod: 26

## 2024-04-30 PROCEDURE — 93321 DOPPLER ECHO F-UP/LMTD STD: CPT | Mod: 26

## 2024-04-30 PROCEDURE — 93325 DOPPLER ECHO COLOR FLOW MAPG: CPT | Mod: 26

## 2024-04-30 RX ORDER — POTASSIUM CHLORIDE 20 MEQ
20 PACKET (EA) ORAL ONCE
Refills: 0 | Status: DISCONTINUED | OUTPATIENT
Start: 2024-04-30 | End: 2024-04-30

## 2024-04-30 RX ORDER — HEPARIN SODIUM 5000 [USP'U]/ML
5000 INJECTION INTRAVENOUS; SUBCUTANEOUS EVERY 8 HOURS
Refills: 0 | Status: DISCONTINUED | OUTPATIENT
Start: 2024-04-30 | End: 2024-05-13

## 2024-04-30 RX ORDER — CALCIUM GLUCONATE 100 MG/ML
1 VIAL (ML) INTRAVENOUS ONCE
Refills: 0 | Status: COMPLETED | OUTPATIENT
Start: 2024-04-30 | End: 2024-04-30

## 2024-04-30 RX ORDER — ENOXAPARIN SODIUM 100 MG/ML
40 INJECTION SUBCUTANEOUS
Refills: 0 | Status: DISCONTINUED | OUTPATIENT
Start: 2024-04-30 | End: 2024-04-30

## 2024-04-30 RX ORDER — CARVEDILOL PHOSPHATE 80 MG/1
25 CAPSULE, EXTENDED RELEASE ORAL EVERY 12 HOURS
Refills: 0 | Status: DISCONTINUED | OUTPATIENT
Start: 2024-04-30 | End: 2024-05-01

## 2024-04-30 RX ADMIN — Medication 75 MICROGRAM(S): at 05:32

## 2024-04-30 RX ADMIN — Medication 10 MILLIGRAM(S): at 19:11

## 2024-04-30 RX ADMIN — SODIUM CHLORIDE 100 MILLILITER(S): 5 INJECTION, SOLUTION INTRAVENOUS at 00:00

## 2024-04-30 RX ADMIN — CARVEDILOL PHOSPHATE 25 MILLIGRAM(S): 80 CAPSULE, EXTENDED RELEASE ORAL at 11:25

## 2024-04-30 RX ADMIN — ANASTROZOLE 1 MILLIGRAM(S): 1 TABLET ORAL at 11:24

## 2024-04-30 RX ADMIN — HEPARIN SODIUM 5000 UNIT(S): 5000 INJECTION INTRAVENOUS; SUBCUTANEOUS at 21:09

## 2024-04-30 RX ADMIN — Medication 100 GRAM(S): at 07:32

## 2024-04-30 RX ADMIN — VALSARTAN 40 MILLIGRAM(S): 80 TABLET ORAL at 05:30

## 2024-04-30 RX ADMIN — CHLORHEXIDINE GLUCONATE 1 APPLICATION(S): 213 SOLUTION TOPICAL at 11:25

## 2024-04-30 RX ADMIN — LATANOPROST 1 DROP(S): 0.05 SOLUTION/ DROPS OPHTHALMIC; TOPICAL at 21:09

## 2024-04-30 RX ADMIN — Medication 40 MILLIEQUIVALENT(S): at 05:31

## 2024-04-30 NOTE — DIETITIAN INITIAL EVALUATION ADULT - PERTINENT LABORATORY DATA
04-30    132<L>  |  99  |  19.5  ----------------------------<  120<H>  3.8   |  21.0<L>  |  0.37<L>    Ca    7.8<L>      30 Apr 2024 04:30  Phos  2.7     04-30  Mg     2.0     04-30    TPro  6.1<L>  /  Alb  3.8  /  TBili  0.9  /  DBili  x   /  AST  30  /  ALT  47<H>  /  AlkPhos  63  04-28  A1C with Estimated Average Glucose Result: 5.3 % (04-29-24 @ 06:00)

## 2024-04-30 NOTE — CHART NOTE - NSCHARTNOTEFT_GEN_A_CORE
[facility]    [facility]    Transfer to: (  ) Medicine    (  ) Telemetry    (  ) RCU   ( ) Neurosurgery                               (  ) Palliative    ( x ) Stroke Unit    (  ) MICU    (  ) __________________    Accepting Physician: Dr. Wu    Signout given to: Dr. Wu    [facility]:  84 y/o female with PMHx HTN and A Fib (started taking  Eliquisx1 week ago, last dose 4/28 AM) BIBA from home due to headache. According to EMS< BP around 206 systolic. On CTH found to have Left sided Caudate hemorrhage w/ extension into the ventricle. ICH 3. Repeat CTH Stable. Exam intact.      Vital Signs Last 24 Hrs  T(C): 36.6 (30 Apr 2024 08:06), Max: 37 (29 Apr 2024 20:00)  T(F): 97.9 (30 Apr 2024 08:06), Max: 98.6 (29 Apr 2024 20:00)  HR: 76 (30 Apr 2024 11:00) (53 - 81)  BP: 148/69 (30 Apr 2024 11:00) (101/66 - 155/68)  BP(mean): 93 (30 Apr 2024 11:00) (70 - 120)  RR: 15 (30 Apr 2024 11:00) (13 - 24)  SpO2: 95% (30 Apr 2024 11:00) (82% - 100%)    Parameters below as of 30 Apr 2024 04:00  Patient On (Oxygen Delivery Method): nasal cannula  O2 Flow (L/min): 2      I&O's Summary    29 Apr 2024 07:01  -  30 Apr 2024 07:00  --------------------------------------------------------  IN: 2602.5 mL / OUT: 1975 mL / NET: 627.5 mL    30 Apr 2024 07:01  -  30 Apr 2024 13:35  --------------------------------------------------------  IN: 690 mL / OUT: 600 mL / NET: 90 mL        Physical Exam:   AAOX3, cooperative, slightly off at baseline, follows commands, speech fluent, comprehension seem intact, face symmetric, tongue midline, EOMI, PERRLA, motor - CRANDALL 5/5 with no drift, sensation intact.  S1S2 present.  CTAB, no crackles.  Abd soft, NT, ND.  No peripheral swelling.      LABS:                           11.2   8.19  )-----------( 238      ( 30 Apr 2024 04:30 )             32.3     04-30    132<L>  |  99  |  19.5  ----------------------------<  120<H>  3.8   |  21.0<L>  |  0.37<L>    Ca    7.8<L>      30 Apr 2024 04:30  Phos  2.7     04-30  Mg     2.0     04-30    TPro  6.1<L>  /  Alb  3.8  /  TBili  0.9  /  DBili  x   /  AST  30  /  ALT  47<H>  /  AlkPhos  63  04-28      PT/INR - ( 29 Apr 2024 06:00 )   PT: 13.8 sec;   INR: 1.25 ratio         PTT - ( 29 Apr 2024 06:00 )  PTT:27.9 sec    Imaging:  < from: CT Head No Cont (04.29.24 @ 20:33) >    IMPRESSION: Stable acute small left caudate head intraparenchymal   hemorrhage measuring 6 mm. Moderate intraventricular hemorrhage   throughout the left lateral ventricle, third ventricle and fourth   ventricle. Mild intraventricular hemorrhage in the right lateral   ventricle. Mild to moderate hydrocephalus, unchanged..    --- End of Report ---            ADRIENNE LAWSON MD; Attending Radiologist  This document has been electronically signed. Apr 29 2024  8:59PM    < end of copied text >    < from: CT Head No Cont (04.28.24 @ 22:53) >    IMPRESSION:    No change inintraventricular hemorrhage and dilatation of the lateral   and third ventricles compared with prior exam. Ill-defined hemorrhage   again seen in the left caudate body and left caudate head.    --- End of Report ---            KEVIN BARBOSA DO; Attending Radiologist  This document has been electronically signed. Apr 28 2024 11:46PM    < end of copied text >    < from: CT Brain Stroke Protocol (04.28.24 @ 20:02) >    IMPRESSION:    Intraventricular hemorrhage, as described above. Ill-defined parenchymal   hemorrhage within the left caudate body and caudate head. Mild dilatation   of the lateral and third ventricles.    Critical findings were discussed with Dr. Elizabeth at 8:08 PM on   4/28/2024.    --- End of Report ---            KEVIN BARBOSA DO; Attending Radiologist  This document has been electronically signed. Apr 28 2024  8:12PM    < end of copied text >          ASSESSMENT & PLAN:   HPI:     ASSESSMENT/PLAN:    NEURO:  Neuro/Vital checks q 2  MRI decision per primary team  Pain control- PRN  Activity: [x] OOB as tolerated [] Bedrest [x] PT [x] OT [] PMNR    PULM: RA  Incentive Spirometry as tolerated    CV:  SBP goal: 100-160  Carvedilol 25q12  Valsartan 40qd    RENAL:  Fluids: 2% @ 100, Wean per primary team    GI:  Diet: Regular  GI prophylaxis [] not indicated [] PPI [] other:  Bowel regimen [] colace [x] senna [] other:    ENDO:   Goal euglycemia (-180)  Anastrazole 1mg daily  Levothyroxine 75mcg daily    HEME/ONC:  VTE prophylaxis: [x] SCDs    ID: Afebrile  Monitor Fever Curve [facility]    [facility]    Transfer to: (  ) Medicine    (  ) Telemetry    (  ) RCU   ( ) Neurosurgery                               (  ) Palliative    ( x ) Stroke Unit    (  ) MICU    (  ) __________________    Accepting Physician: Dr. Wu    Signout given to: Dr. Wu    [facility]:  82 y/o female with PMHx HTN and A Fib (started taking  Eliquisx1 week ago, last dose 4/28 AM) BIBA from home due to headache. According to EMS< BP around 206 systolic. On CTH found to have Left sided Caudate hemorrhage w/ extension into the ventricle. ICH 3. Repeat CTH Stable. Exam intact.      Vital Signs Last 24 Hrs  T(C): 36.6 (30 Apr 2024 08:06), Max: 37 (29 Apr 2024 20:00)  T(F): 97.9 (30 Apr 2024 08:06), Max: 98.6 (29 Apr 2024 20:00)  HR: 76 (30 Apr 2024 11:00) (53 - 81)  BP: 148/69 (30 Apr 2024 11:00) (101/66 - 155/68)  BP(mean): 93 (30 Apr 2024 11:00) (70 - 120)  RR: 15 (30 Apr 2024 11:00) (13 - 24)  SpO2: 95% (30 Apr 2024 11:00) (82% - 100%)    Parameters below as of 30 Apr 2024 04:00  Patient On (Oxygen Delivery Method): nasal cannula  O2 Flow (L/min): 2      I&O's Summary    29 Apr 2024 07:01  -  30 Apr 2024 07:00  --------------------------------------------------------  IN: 2602.5 mL / OUT: 1975 mL / NET: 627.5 mL    30 Apr 2024 07:01  -  30 Apr 2024 13:35  --------------------------------------------------------  IN: 690 mL / OUT: 600 mL / NET: 90 mL        Physical Exam:   AAOX3, cooperative, slightly off at baseline, follows commands, speech fluent, comprehension seem intact, face symmetric, tongue midline, EOMI, PERRLA, motor - CRANDALL 5/5 with no drift, sensation intact.  S1S2 present.  CTAB, no crackles.  Abd soft, NT, ND.  No peripheral swelling.      LABS:                           11.2   8.19  )-----------( 238      ( 30 Apr 2024 04:30 )             32.3     04-30    132<L>  |  99  |  19.5  ----------------------------<  120<H>  3.8   |  21.0<L>  |  0.37<L>    Ca    7.8<L>      30 Apr 2024 04:30  Phos  2.7     04-30  Mg     2.0     04-30    TPro  6.1<L>  /  Alb  3.8  /  TBili  0.9  /  DBili  x   /  AST  30  /  ALT  47<H>  /  AlkPhos  63  04-28      PT/INR - ( 29 Apr 2024 06:00 )   PT: 13.8 sec;   INR: 1.25 ratio         PTT - ( 29 Apr 2024 06:00 )  PTT:27.9 sec    Imaging:  < from: CT Head No Cont (04.29.24 @ 20:33) >    IMPRESSION: Stable acute small left caudate head intraparenchymal   hemorrhage measuring 6 mm. Moderate intraventricular hemorrhage   throughout the left lateral ventricle, third ventricle and fourth   ventricle. Mild intraventricular hemorrhage in the right lateral   ventricle. Mild to moderate hydrocephalus, unchanged..    --- End of Report ---            ADRIENNE LAWSON MD; Attending Radiologist  This document has been electronically signed. Apr 29 2024  8:59PM    < end of copied text >    < from: CT Head No Cont (04.28.24 @ 22:53) >    IMPRESSION:    No change inintraventricular hemorrhage and dilatation of the lateral   and third ventricles compared with prior exam. Ill-defined hemorrhage   again seen in the left caudate body and left caudate head.    --- End of Report ---            KEVIN BARBOSA DO; Attending Radiologist  This document has been electronically signed. Apr 28 2024 11:46PM    < end of copied text >    < from: CT Brain Stroke Protocol (04.28.24 @ 20:02) >    IMPRESSION:    Intraventricular hemorrhage, as described above. Ill-defined parenchymal   hemorrhage within the left caudate body and caudate head. Mild dilatation   of the lateral and third ventricles.    Critical findings were discussed with Dr. Elizabeth at 8:08 PM on   4/28/2024.    --- End of Report ---            KEVIN BARBOSA DO; Attending Radiologist  This document has been electronically signed. Apr 28 2024  8:12PM    < end of copied text >          ASSESSMENT & PLAN:  83F s/p HTN ICH w/ ivh extension      NEURO:  Neuro/Vital checks q 2  MRI decision per primary team  Pain control- PRN  Activity: [x] OOB as tolerated [] Bedrest [x] PT [x] OT [] PMNR    PULM: RA  Incentive Spirometry as tolerated    CV:  SBP goal: 100-160  Carvedilol 25q12  Valsartan 40qd    RENAL:  Fluids: 2% @ 100, Wean per primary team    GI:  Diet: Regular  GI prophylaxis [] not indicated [] PPI [] other:  Bowel regimen [] colace [x] senna [] other:    ENDO:   Goal euglycemia (-180)  Anastrazole 1mg daily  Levothyroxine 75mcg daily    HEME/ONC:  VTE prophylaxis: [x] SCDs [ x ] Lovenox 40mg daily     ID: Afebrile  Monitor Fever Curve

## 2024-04-30 NOTE — CHART NOTE - NSCHARTNOTEFT_GEN_A_CORE
Spoke with patient's brother, Umberto Butler ([phone]). Updated on patient's clinical status, all questions were answered to family's satisfaction.

## 2024-04-30 NOTE — CONSULT NOTE ADULT - ASSESSMENT
82 y/o female with PMHx HTN and A Fib ( reportedly only started taking  Eliquis prior to admission, last dose 4/28 AM), hx of Breast CA? on Anastrazole, who presented to [facility] hospital with headache.  Patient is a poor historian so hx obtained from chart.  Per notes from [facility], EMS had reported SBP > 200 upon arrival.  Upon [facility] arrival, CT head was done and showed an IVH and L caudate IPH and she was reversed with Andexxa and started on Cardene gtt, then transferred here.  Cardene was discontinued this AM.    Repeat CTs stables.      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  Patient has a hx of noncompliance to meds.        PLAN:  Neuro: IPH/IVH, Disorientation, Hydrocephalus  - close monitoring and frequent neurochecks for signs of neurologic deterioration   - STAT head CT for any neurologic change, especially given patient has hydrocephalus  - Will need serial imaging to monitor hydrocephalus  - head of bed at 30 degrees   - check MRI of the brain w/wo  - blood pressure control with goal blood pressure under 150/90 (goal range 130-150 systolic)   - hold all antiplatelets/anticoagulants.  Eventually will need to be restarted on at minimum antiplatelets, but would hold of at least 14 days.  Anticoagulation only to be restarted if BP remains stable and if she can demonstrate compliance (with current mental status, likely NO)  - maintain normovolemia, normoglycemia, normonatremia, and normothermia   - consider restarting pharmacologic DVT prophylaxis  - consult PT/OT/SLP    Cardio:  HTN emergency on admission  - Cont. Diovan for now. Was on Irbesartan at home  - Cont. Coreg 25mg BID  - Hydralazine prn  - TTE wnl    Heme: Anemia  - Monitor H&H  - LE duplex     Renal: Hyponatremia  - Monitor Na      OK to transfer patient to Stroke service

## 2024-04-30 NOTE — DIETITIAN INITIAL EVALUATION ADULT - ADD RECOMMEND
Refusing supplementation at this time  Encourage improved appetite  nutrition food choices discussed for optimal po intake.   Will provide magic cup BID  Rec MVI daily

## 2024-04-30 NOTE — PROGRESS NOTE ADULT - SUBJECTIVE AND OBJECTIVE BOX
HPI:   84 y/o female with PMHx HTN and A Fib (started taking  Eliquisx1 week ago, last dose 4/28 AM) BIBA from home due to headache. pt reports her blood pressure was elevated earlier today but could not recall the number. According to EMS< BP around 206 systolic. pt currently on Carvedilol. pt unable to recall other bp meds. pt denies cp, sob, visual changes, slurred speech, fall/head injury, numbness/weakness, or any other complaints. ICH 3   (28 Apr 2024 22:41)      INTERVAL HPI/OVERNIGHT EVENTS:  83y F seen and examined at bedside. Noted to be neurologically intact. Rpeat CTH Stable. Remains Low threshold for EVD placement if neurological decline       MEDICATIONS  (STANDING):  anastrozole 1 milliGRAM(s) Oral daily  carvedilol 25 milliGRAM(s) Oral every 12 hours  chlorhexidine 2% Cloths 1 Application(s) Topical daily  influenza  Vaccine (HIGH DOSE) 0.7 milliLiter(s) IntraMuscular once  latanoprost 0.005% Ophthalmic Solution 1 Drop(s) Both EYES at bedtime  levothyroxine 75 MICROGram(s) Oral daily  niCARdipine Infusion 5 mG/Hr (25 mL/Hr) IV Continuous <Continuous>  potassium chloride   Powder 40 milliEquivalent(s) Oral once  sodium chloride 2% . 1000 milliLiter(s) (100 mL/Hr) IV Continuous <Continuous>  valsartan 40 milliGRAM(s) Oral daily    MEDICATIONS  (PRN):  hydrALAZINE Injectable 10 milliGRAM(s) IV Push every 2 hours PRN SBP> 150 and DBP> 90  labetalol Injectable 10 milliGRAM(s) IV Push every 2 hours PRN SBP> 150 and DBP> 90  senna 2 Tablet(s) Oral at bedtime PRN Constipation      Allergies    No Known Allergies    Intolerances      Vital Signs Last 24 Hrs  T(C): 36.5 (29 Apr 2024 23:08), Max: 36.9 (29 Apr 2024 03:20)  T(F): 97.7 (29 Apr 2024 23:08), Max: 98.4 (29 Apr 2024 03:20)  HR: 64 (29 Apr 2024 19:00) (51 - 81)  BP: 150/101 (29 Apr 2024 19:00) (105/72 - 150/101)  BP(mean): 117 (29 Apr 2024 19:00) (79 - 120)  RR: 17 (29 Apr 2024 19:00) (12 - 25)  SpO2: 98% (29 Apr 2024 19:00) (91% - 100%)    Parameters below as of 29 Apr 2024 18:00  Patient On (Oxygen Delivery Method): nasal cannula  O2 Flow (L/min): 2      PHYSICAL EXAM:  General: No Acute Distress   Neurological: Awake, alert & oriented to person, place and time, Following Commands, PERRL, EOMI, Visual fields intact, Face Symmetric, Speech Fluent, Moving all extremities   Muscle Strength: RUE: 5/5 LUE: 5/5 RLE: 5/5 LLE: 5/5  No Drift   Sensation Intact to Light Touch   Cerebellar: There is no dysmetria on finger to nose testing.  Gait : deferred  Pulmonary: non labored breathing, no use of accessory muscles  Cardiovascular:  Regular Rate and Rhythm   Gastrointestinal: Soft, Nontender, Nondistended      LABS:                        11.3   16.53 )-----------( 266      ( 28 Apr 2024 21:54 )             33.3     04-29    131<L>  |  98  |  19.2  ----------------------------<  146<H>  3.7   |  20.0<L>  |  0.41<L>    Ca    8.6      29 Apr 2024 21:40  Phos  2.3     04-29  Mg     1.7     04-29    TPro  6.1<L>  /  Alb  3.8  /  TBili  0.9  /  DBili  x   /  AST  30  /  ALT  47<H>  /  AlkPhos  63  04-28        PT/INR - ( 29 Apr 2024 06:00 )   PT: 13.8 sec;   INR: 1.25 ratio         PTT - ( 29 Apr 2024 06:00 )  PTT:27.9 sec  Urinalysis Basic - ( 29 Apr 2024 21:40 )    Color: x / Appearance: x / SG: x / pH: x  Gluc: 146 mg/dL / Ketone: x  / Bili: x / Urobili: x   Blood: x / Protein: x / Nitrite: x   Leuk Esterase: x / RBC: x / WBC x   Sq Epi: x / Non Sq Epi: x / Bacteria: x        RADIOLOGY & ADDITIONAL TESTS:

## 2024-04-30 NOTE — DIETITIAN INITIAL EVALUATION ADULT - ORAL INTAKE PTA/DIET HISTORY
Met with pt who reports good po intake PTA, however poor since admission x 2 days. # noted with possible 2# weight loss noted if accurate. Pt with some nausea as well. Pt refusing nutritional supplementation at this time. Pt overall feels better as per pt.

## 2024-04-30 NOTE — CONSULT NOTE ADULT - SUBJECTIVE AND OBJECTIVE BOX
[facility] Stroke Attending Note  CC: IVH  HPI:   84 y/o female with PMHx HTN and A Fib ( reportedly only started taking  Eliquis prior to admission, last dose 4/28 AM), hx of Breast CA? on Anastrazole, who presented to [facility] hospital with headache.  Patient is a poor historian so hx obtained from chart.  Per notes from [facility], EMS had reported SBP > 200 upon arrival.  Upon [facility] arrival, CT head was done and showed an IVH and she was reversed with Andexxa and started on Cardene gtt, then transferred here.  Cardene was discontinued this AM.    Repeat CTs stables.  Currently patient denies any symptoms--no HA, no diplopia.      Patient seen by [facility] cardio in March:  "pt has not had her  coreg in a week, she ran out. also lost her  in january and had been  taking care of him and was unable to care of herself. was seeing her pcp today  and noted to have elevated BP so was sent to cardiologist. saw dr miranda and was  found ot have /105. pt was given a 5 mg amlodipine and shanon dto go to ED  for more aggressive management of her BP."      Stroke risk factors:    PAST MEDICAL & SURGICAL HISTORY:  HTN (hypertension)  HLD (hyperlipidemia)      MEDICATIONS  (STANDING):  anastrozole 1 milliGRAM(s) Oral daily  carvedilol 25 milliGRAM(s) Oral every 12 hours  chlorhexidine 2% Cloths 1 Application(s) Topical daily  influenza  Vaccine (HIGH DOSE) 0.7 milliLiter(s) IntraMuscular once  latanoprost 0.005% Ophthalmic Solution 1 Drop(s) Both EYES at bedtime  levothyroxine 75 MICROGram(s) Oral daily  niCARdipine Infusion 5 mG/Hr (25 mL/Hr) IV Continuous <Continuous>  sodium chloride 2% . 1000 milliLiter(s) (100 mL/Hr) IV Continuous <Continuous>  valsartan 40 milliGRAM(s) Oral daily    MEDICATIONS  (PRN):  hydrALAZINE Injectable 10 milliGRAM(s) IV Push every 2 hours PRN SBP> 150 and DBP> 90  labetalol Injectable 10 milliGRAM(s) IV Push every 2 hours PRN SBP> 150 and DBP> 90  senna 2 Tablet(s) Oral at bedtime PRN Constipation      Allergies    No Known Allergies    Intolerances        SOCIAL HISTORY:  no tob,   no alcohol   no drugs    FAMILY HISTORY:        ROS: 14 point ROS negative other than what is present in HPI or below    Vital Signs Last 24 Hrs  T(C): 36.6 (30 Apr 2024 08:06), Max: 37 (29 Apr 2024 20:00)  T(F): 97.9 (30 Apr 2024 08:06), Max: 98.6 (29 Apr 2024 20:00)  HR: 76 (30 Apr 2024 11:00) (53 - 81)  BP: 148/69 (30 Apr 2024 11:00) (101/66 - 155/68)  BP(mean): 93 (30 Apr 2024 11:00) (70 - 120)  RR: 15 (30 Apr 2024 11:00) (13 - 24)  SpO2: 95% (30 Apr 2024 11:00) (82% - 100%)    Parameters below as of 30 Apr 2024 04:00  Patient On (Oxygen Delivery Method): nasal cannula  O2 Flow (L/min): 2        Physical Exam:  General: No acute distress.   HEENT: Head normocephalic, atraumatic. Conjunctivae clear w/o exudates or hemorrhage. Sclera non-icteric. Nares are patent bilaterally. Mucous membranes pink and moist.  No tonsillar swelling or exudates.    Neck: Supple, no adenopathy. Trachea midline. No JVD.  Cardiac: Normal rate and rhythm. S1, S2 auscultated. No murmurs, gallops, or rubs.     Respiratory: Lung sounds clear in all fields. Chest wall symmetric, nontender.   Abdominal: Soft, nondistended, nontender. Bowel sounds normoactive x 4 quadrants. No masses, hepatomegaly, or splenomegaly.   Skin: Skin is warm, dry and intact without rashes or lesions. Appropriate color for ethnicity. Nailbeds pink with no cyanosis or clubbing.   Extremities: No edema, 2+ peripheral pulses in b/l upper and b/l lower extremities. Full range of motion in all joints.     Detailed Neurologic Exam:    Mental status: The patient is awake and alert and has normal attention span.  The patient is oriented to self only, does not know the place, thinks she is home, thinks it is 2020, + perseveration,  The patient is able to name objects, follow commands with perseveration, repeat sentences.    Cranial nerves: Pupils equal and react symmetrically to light. There is no visual field deficit to confrontation. Extraocular motion is full with no nystagmus. There is no ptosis. Facial sensation is intact. Facial musculature is symmetric. Palate elevates symmetrically. Tongue is midline.    Motor: There is normal bulk and tone.  There is no tremor.  Strength is 5/5 in the right arm and leg.   Strength is 5/5 in the left arm and leg.    Sensation: Intact to light touch and pin in 4 extremities    Reflexes: 1-2+ throughout and plantar responses are flexor.    Cerebellar: There is no dysmetria on finger to nose testing.    Gait : deferred          LABS:                         11.2   8.19  )-----------( 238      ( 30 Apr 2024 04:30 )             32.3       04-30    132<L>  |  99  |  19.5  ----------------------------<  120<H>  3.8   |  21.0<L>  |  0.37<L>    Ca    7.8<L>      30 Apr 2024 04:30  Phos  2.7     04-30  Mg     2.0     04-30    TPro  6.1<L>  /  Alb  3.8  /  TBili  0.9  /  DBili  x   /  AST  30  /  ALT  47<H>  /  AlkPhos  63  04-28      PT/INR - ( 29 Apr 2024 06:00 )   PT: 13.8 sec;   INR: 1.25 ratio         PTT - ( 29 Apr 2024 06:00 )  PTT:27.9 sec    04-29 Chol 151 LDL -- HDL 79 Trig 62, 03-23 Chol 145 LDL -- HDL 71 Trig 41    A1C:         RADIOLOGY & ADDITIONAL STUDIES (independently reviewed unless otherwise noted):  < from: CT Angio Neck Stroke Protocol w/ IV Cont (04.28.24 @ 20:02) >    CTA NECK:  No significant stenosis of the cervical carotid arteries based   on NASCET criteria. Patent cervical vertebral arteries.    CTA HEAD: No high-grade stenosis or occlusion of the major proximal   arterial branches. No evidence of an intracranial arterial aneurysm or   arteriovenous malformation on CTA. No evidence of active bleeding within   the left caudate and intraventricular hemorrhage.    < end of copied text >  < from: CT Brain Stroke Protocol (04.28.24 @ 20:02) >  There is intraventricular hemorrhage with blood casting the left lateral   ventricle, third ventricle, and fourth ventricle and small volume of   intraventricular hemorrhage in the anterior body of the right lateral   ventricle. There is ill-defined parenchymal hemorrhage within the left   caudate body and caudate head. The lateral and third ventricles are   mildly dilated. There is no shift of the midline or central herniation.   The basal cisterns are not effaced. There is generalized cerebral and   cerebellar volume loss. There are chronic ischemic changes in the   frontoparietal white matter. There are atherosclerotic calcifications of   the intracranial carotid arteries.    The regional soft tissues and osseous structures are unremarkable apart   from evidence of bilateral lens surgery and scattered calcified scalp   sebaceous cysts including a large midline suboccipital scalp sebaceous   cyst measuring up to 3.0 x 2.0 x 1.5 cm. The visualized paranasal sinuses   and tympanic/mastoid cavities are clear.    < end of copied text >  < from: CT Head No Cont (04.29.24 @ 20:33) >   Stable acute small left caudate head intraparenchymal   hemorrhage measuring 6 mm. Moderate intraventricular hemorrhage   throughout the left lateral ventricle, third ventricle and fourth   ventricle. Mild intraventricular hemorrhage in the right lateral   ventricle. Mild to moderate hydrocephalus, unchanged..    < end of copied text >

## 2024-04-30 NOTE — PROGRESS NOTE ADULT - ASSESSMENT
PLAN:   82 y/o female with PMHx HTN and A Fib (on Eliquis) BIBA from home due to headache. Found to have Large IVH with extension into 3rd ventricle. Transferred to [facility] for further treatment.       Neuro:  - Q1 hour Neuro checks, Q1 hour Vitals  - Low threshold for EVD placement  - potential Angiogram to r/o vascular abnormalities  - S/p Adnexxa to reverse Eliquis use.   - CTH stable  - repeat CTH if neurological decline.   -SBP: , on nicardipine to maintain   - NPO at this time.   - hyponatremic, 2% increased to 100,   - Monitor H&H,  - hold chemical DVT prophylaxis given acute hemorrhage.   - DVT ppx: SCDs  - pending  LE duplex     Case to be discussed in am with Neuro sx Attending Dr. Zuluaga

## 2024-04-30 NOTE — DIETITIAN INITIAL EVALUATION ADULT - PERTINENT MEDS FT
MEDICATIONS  (STANDING):  anastrozole 1 milliGRAM(s) Oral daily  carvedilol 25 milliGRAM(s) Oral every 12 hours  chlorhexidine 2% Cloths 1 Application(s) Topical daily  influenza  Vaccine (HIGH DOSE) 0.7 milliLiter(s) IntraMuscular once  latanoprost 0.005% Ophthalmic Solution 1 Drop(s) Both EYES at bedtime  levothyroxine 75 MICROGram(s) Oral daily  niCARdipine Infusion 5 mG/Hr (25 mL/Hr) IV Continuous <Continuous>  sodium chloride 2% . 1000 milliLiter(s) (100 mL/Hr) IV Continuous <Continuous>  valsartan 40 milliGRAM(s) Oral daily    MEDICATIONS  (PRN):  hydrALAZINE Injectable 10 milliGRAM(s) IV Push every 2 hours PRN SBP> 150 and DBP> 90  labetalol Injectable 10 milliGRAM(s) IV Push every 2 hours PRN SBP> 150 and DBP> 90  senna 2 Tablet(s) Oral at bedtime PRN Constipation

## 2024-04-30 NOTE — DIETITIAN INITIAL EVALUATION ADULT - IDEAL BODY WEIGHT (KG)
Faxed.  
Lena with [employer] is looking for 1/18/22 office notes be faxed to them at fax number [phone], no ATTN needed. Any questions may call their office.  
[phone]

## 2024-04-30 NOTE — DIETITIAN INITIAL EVALUATION ADULT - OTHER INFO
84 y/o female with PMHx HTN and A Fib (started taking  Eliquisx1 week ago, last dose 4/28 AM) BIBA from home due to headache. pt reports her blood pressure was elevated earlier today but could not recall the number. According to EMS< BP around 206 systolic. pt currently on Carvedilol. pt unable to recall other bp meds. pt denies cp, sob, visual changes, slurred speech, fall/head injury, numbness/weakness, or any other complaints.

## 2024-05-01 DIAGNOSIS — I61.9 NONTRAUMATIC INTRACEREBRAL HEMORRHAGE, UNSPECIFIED: ICD-10-CM

## 2024-05-01 DIAGNOSIS — I48.91 UNSPECIFIED ATRIAL FIBRILLATION: ICD-10-CM

## 2024-05-01 DIAGNOSIS — I61.5 NONTRAUMATIC INTRACEREBRAL HEMORRHAGE, INTRAVENTRICULAR: ICD-10-CM

## 2024-05-01 DIAGNOSIS — I10 ESSENTIAL (PRIMARY) HYPERTENSION: ICD-10-CM

## 2024-05-01 LAB
ANION GAP SERPL CALC-SCNC: 10 MMOL/L — SIGNIFICANT CHANGE UP (ref 5–17)
BUN SERPL-MCNC: 21 MG/DL — HIGH (ref 8–20)
CALCIUM SERPL-MCNC: 8.4 MG/DL — SIGNIFICANT CHANGE UP (ref 8.4–10.5)
CHLORIDE SERPL-SCNC: 101 MMOL/L — SIGNIFICANT CHANGE UP (ref 96–108)
CO2 SERPL-SCNC: 22 MMOL/L — SIGNIFICANT CHANGE UP (ref 22–29)
CREAT SERPL-MCNC: 0.44 MG/DL — LOW (ref 0.5–1.3)
EGFR: 95 ML/MIN/1.73M2 — SIGNIFICANT CHANGE UP
GLUCOSE SERPL-MCNC: 113 MG/DL — HIGH (ref 70–99)
HCT VFR BLD CALC: 34.6 % — SIGNIFICANT CHANGE UP (ref 34.5–45)
HGB BLD-MCNC: 11.7 G/DL — SIGNIFICANT CHANGE UP (ref 11.5–15.5)
MAGNESIUM SERPL-MCNC: 1.8 MG/DL — SIGNIFICANT CHANGE UP (ref 1.6–2.6)
MCHC RBC-ENTMCNC: 29.6 PG — SIGNIFICANT CHANGE UP (ref 27–34)
MCHC RBC-ENTMCNC: 33.8 GM/DL — SIGNIFICANT CHANGE UP (ref 32–36)
MCV RBC AUTO: 87.6 FL — SIGNIFICANT CHANGE UP (ref 80–100)
PHOSPHATE SERPL-MCNC: 1.8 MG/DL — LOW (ref 2.4–4.7)
PLATELET # BLD AUTO: 305 K/UL — SIGNIFICANT CHANGE UP (ref 150–400)
POTASSIUM SERPL-MCNC: 3.6 MMOL/L — SIGNIFICANT CHANGE UP (ref 3.5–5.3)
POTASSIUM SERPL-SCNC: 3.6 MMOL/L — SIGNIFICANT CHANGE UP (ref 3.5–5.3)
RBC # BLD: 3.95 M/UL — SIGNIFICANT CHANGE UP (ref 3.8–5.2)
RBC # FLD: 13.9 % — SIGNIFICANT CHANGE UP (ref 10.3–14.5)
SODIUM SERPL-SCNC: 133 MMOL/L — LOW (ref 135–145)
WBC # BLD: 13 K/UL — HIGH (ref 3.8–10.5)
WBC # FLD AUTO: 13 K/UL — HIGH (ref 3.8–10.5)

## 2024-05-01 PROCEDURE — 99232 SBSQ HOSP IP/OBS MODERATE 35: CPT

## 2024-05-01 PROCEDURE — 99223 1ST HOSP IP/OBS HIGH 75: CPT

## 2024-05-01 PROCEDURE — 70450 CT HEAD/BRAIN W/O DYE: CPT | Mod: 26

## 2024-05-01 PROCEDURE — 99233 SBSQ HOSP IP/OBS HIGH 50: CPT

## 2024-05-01 RX ORDER — METOPROLOL TARTRATE 50 MG
75 TABLET ORAL EVERY 8 HOURS
Refills: 0 | Status: DISCONTINUED | OUTPATIENT
Start: 2024-05-01 | End: 2024-05-07

## 2024-05-01 RX ORDER — ACETAMINOPHEN 500 MG
650 TABLET ORAL ONCE
Refills: 0 | Status: COMPLETED | OUTPATIENT
Start: 2024-05-01 | End: 2024-05-01

## 2024-05-01 RX ORDER — MAGNESIUM SULFATE 500 MG/ML
2 VIAL (ML) INJECTION ONCE
Refills: 0 | Status: COMPLETED | OUTPATIENT
Start: 2024-05-01 | End: 2024-05-01

## 2024-05-01 RX ORDER — VALSARTAN 80 MG/1
80 TABLET ORAL DAILY
Refills: 0 | Status: ACTIVE | OUTPATIENT
Start: 2024-05-01 | End: 2025-03-30

## 2024-05-01 RX ORDER — SODIUM,POTASSIUM PHOSPHATES 278-250MG
1 POWDER IN PACKET (EA) ORAL ONCE
Refills: 0 | Status: COMPLETED | OUTPATIENT
Start: 2024-05-01 | End: 2024-05-01

## 2024-05-01 RX ORDER — VALSARTAN 80 MG/1
80 TABLET ORAL DAILY
Refills: 0 | Status: DISCONTINUED | OUTPATIENT
Start: 2024-05-01 | End: 2024-05-01

## 2024-05-01 RX ORDER — POTASSIUM PHOSPHATE, MONOBASIC POTASSIUM PHOSPHATE, DIBASIC 236; 224 MG/ML; MG/ML
30 INJECTION, SOLUTION INTRAVENOUS ONCE
Refills: 0 | Status: COMPLETED | OUTPATIENT
Start: 2024-05-01 | End: 2024-05-01

## 2024-05-01 RX ORDER — SODIUM CHLORIDE 9 MG/ML
1 INJECTION INTRAMUSCULAR; INTRAVENOUS; SUBCUTANEOUS DAILY
Refills: 0 | Status: DISCONTINUED | OUTPATIENT
Start: 2024-05-01 | End: 2024-05-02

## 2024-05-01 RX ORDER — VALSARTAN 80 MG/1
40 TABLET ORAL DAILY
Refills: 0 | Status: DISCONTINUED | OUTPATIENT
Start: 2024-05-01 | End: 2024-05-01

## 2024-05-01 RX ADMIN — Medication 650 MILLIGRAM(S): at 13:03

## 2024-05-01 RX ADMIN — Medication 10 MILLIGRAM(S): at 05:29

## 2024-05-01 RX ADMIN — HEPARIN SODIUM 5000 UNIT(S): 5000 INJECTION INTRAVENOUS; SUBCUTANEOUS at 13:04

## 2024-05-01 RX ADMIN — Medication 1 PACKET(S): at 08:53

## 2024-05-01 RX ADMIN — Medication 10 MILLIGRAM(S): at 16:10

## 2024-05-01 RX ADMIN — HEPARIN SODIUM 5000 UNIT(S): 5000 INJECTION INTRAVENOUS; SUBCUTANEOUS at 05:17

## 2024-05-01 RX ADMIN — SODIUM CHLORIDE 1 GRAM(S): 9 INJECTION INTRAMUSCULAR; INTRAVENOUS; SUBCUTANEOUS at 11:02

## 2024-05-01 RX ADMIN — VALSARTAN 40 MILLIGRAM(S): 80 TABLET ORAL at 05:16

## 2024-05-01 RX ADMIN — Medication 10 MILLIGRAM(S): at 03:01

## 2024-05-01 RX ADMIN — Medication 10 MILLIGRAM(S): at 18:18

## 2024-05-01 RX ADMIN — Medication 650 MILLIGRAM(S): at 06:00

## 2024-05-01 RX ADMIN — ANASTROZOLE 1 MILLIGRAM(S): 1 TABLET ORAL at 11:01

## 2024-05-01 RX ADMIN — Medication 75 MILLIGRAM(S): at 21:43

## 2024-05-01 RX ADMIN — Medication 25 GRAM(S): at 08:53

## 2024-05-01 RX ADMIN — LATANOPROST 1 DROP(S): 0.05 SOLUTION/ DROPS OPHTHALMIC; TOPICAL at 21:46

## 2024-05-01 RX ADMIN — Medication 650 MILLIGRAM(S): at 13:10

## 2024-05-01 RX ADMIN — Medication 650 MILLIGRAM(S): at 05:29

## 2024-05-01 RX ADMIN — VALSARTAN 80 MILLIGRAM(S): 80 TABLET ORAL at 11:15

## 2024-05-01 RX ADMIN — Medication 75 MILLIGRAM(S): at 13:04

## 2024-05-01 RX ADMIN — POTASSIUM PHOSPHATE, MONOBASIC POTASSIUM PHOSPHATE, DIBASIC 83.33 MILLIMOLE(S): 236; 224 INJECTION, SOLUTION INTRAVENOUS at 09:48

## 2024-05-01 RX ADMIN — Medication 75 MICROGRAM(S): at 05:16

## 2024-05-01 RX ADMIN — CARVEDILOL PHOSPHATE 25 MILLIGRAM(S): 80 CAPSULE, EXTENDED RELEASE ORAL at 01:18

## 2024-05-01 RX ADMIN — HEPARIN SODIUM 5000 UNIT(S): 5000 INJECTION INTRAVENOUS; SUBCUTANEOUS at 21:43

## 2024-05-01 NOTE — CONSULT NOTE ADULT - PROBLEM SELECTOR RECOMMENDATION 9
.  - [id number]  - now s/p intraventricular bleed on Eliquis, endorses fall prior to initiation of Eliquis but did not communicate to team  - Currently Afib with rates to 120s, 130s  - change coreg to Metoprolol 75mg PO q8H with hold parameters  - TTE with EF 55-60%, normal RV mild to mod TR, severe pHTN  - Will need OP monitor to assess AF burden  - will also need OP evaluation for LAAO when current medical problems resolve

## 2024-05-01 NOTE — PROGRESS NOTE ADULT - ASSESSMENT
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  Pt surgically stable a time. Minimal change to neuro exam. CTH from today stable.     MRI today- results pending  Q2 hour Neuro checks  Low threshold for EVD placement  CTH from 5/1 stable  repeat CTH if neurological decline.   regular diet  Monitor H&H     case and plan to be discussed with Dr. 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  Pt surgically stable a time. Minimal change to neuro exam. CTH from today stable.     MRI today- results pending  Q2 hour Neuro checks  Low threshold for EVD placement  CTH from 5/1 stable  repeat CTH if neurological decline.   regular diet  Monitor H&H     case and plan discussed with Dr. Myrick

## 2024-05-01 NOTE — CONSULT NOTE ADULT - SUBJECTIVE AND OBJECTIVE BOX
[facility]                                              CARDIOLOGY AT [address], [place] [address]                                             Telephone: [phone]. Fax:[phone]                                                       CARDIOLOGY CONSULTATION NOTE                                                                                             History obtained by: Patient and medical record  Community Cardiologist: Dr. North [phone]   obtained: Yes [  ] No [ x ]  Reason for Consultation: HTN, Afib with ICH  Available out pt records reviewed: Yes [ x ] No [  ]    Chief complaint:    Patient is a 83y old  Female who presents with a chief complaint of IVH (30 Apr 2024 12:59)      HPI:  84 y/o F with PMHx HTN (noncompliant with meds), aFIB        84 y/o female with PMHx HTN and A Fib (started taking  Eliquisx1 week ago, last dose 4/28 AM) BIBA from home due to headache. pt reports her blood pressure was elevated earlier today but could not recall the number. According to EMS< BP around 206 systolic. pt currently on Carvedilol. pt unable to recall other bp meds. pt denies cp, sob, visual changes, slurred speech, fall/head injury, numbness/weakness, or any other complaints. ICH 3      CARDIAC TESTING   ECHO:    STRESS:    CATH:     ELECTROPHYSIOLOGY:     PAST MEDICAL HISTORY  HTN (hypertension)    HLD (hyperlipidemia)        PAST SURGICAL HISTORY      SOCIAL HISTORY:  Denies smoking/alcohol/drugs  CIGARETTES:     ALCOHOL:  DRUGS:    FAMILY HISTORY:    Family History of Cardiovascular Disease:  Yes [  ] No [  ]  Coronary Artery Disease in first degree relative: Yes [  ] No [  ]  Sudden Cardiac Death in First degree relative: Yes [  ] No [  ]    HOME MEDICATIONS:  anastrozole 1 mg oral tablet: 1 tab(s) orally once a day (29 Apr 2024 09:47)  eliquis:  (29 Apr 2024 09:47)  irbesartan 150 mg oral tablet: 1 tab(s) orally 2 times a day (29 Apr 2024 09:46)  latanoprost 0.005% ophthalmic solution: 1 drop(s) in each eye 2 times a day (29 Apr 2024 09:47)  pravastatin 40 mg oral tablet: 1 tab(s) orally once a day (29 Apr 2024 09:47)  Synthroid 75 mcg (0.075 mg) oral tablet: 1 tab(s) orally once a day (at bedtime) (29 Apr 2024 09:47)      CURRENT CARDIAC MEDICATIONS:  carvedilol 25 milliGRAM(s) Oral every 12 hours  hydrALAZINE Injectable 10 milliGRAM(s) IV Push every 2 hours PRN SBP> 150 and DBP> 90  labetalol Injectable 10 milliGRAM(s) IV Push every 2 hours PRN SBP> 150 and DBP> 90  valsartan 80 milliGRAM(s) Oral daily      CURRENT OTHER MEDICATIONS:  senna 2 Tablet(s) Oral at bedtime PRN Constipation  anastrozole 1 milliGRAM(s) Oral daily  heparin   Injectable 5000 Unit(s) SubCutaneous every 8 hours  influenza  Vaccine (HIGH DOSE) 0.7 milliLiter(s) IntraMuscular once  latanoprost 0.005% Ophthalmic Solution 1 Drop(s) Both EYES at bedtime  levothyroxine 75 MICROGram(s) Oral daily  potassium phosphate IVPB 30 milliMole(s) IV Intermittent once, Stop order after: 1 Doses      ALLERGIES:   No Known Allergies      REVIEW OF SYMPTOMS:   CONSTITUTIONAL: No fever, no chills, no weight loss, no weight gain, no fatigue   ENMT:  No vertigo; No sinus or throat pain  NECK: No pain or stiffness  CARDIOVASCULAR: No chest pain, no dyspnea, no syncope/presyncope, no palpitations, no dizziness, no Orthopnea, no Paroxsymal nocturnal dyspnea  RESPIRATORY: no Shortness of breath, no cough, no wheezing  : No dysuria, no hematuria   GI: No dark color stool, no nausea, no diarrhea, no constipation, no abdominal pain   NEURO: No headache, no slurred speech   MUSCULOSKELETAL: No joint pain or swelling; No muscle, back, or extremity pain  PSYCH: No agitation, no anxiety.    ALL OTHER REVIEW OF SYSTEMS ARE NEGATIVE.    VITAL SIGNS:  T(C): 36.3 (05-01-24 @ 04:02), Max: 36.9 (04-30-24 @ 17:02)  T(F): 97.4 (05-01-24 @ 04:02), Max: 98.4 (04-30-24 @ 17:02)  HR: 121 (05-01-24 @ 06:00) (58 - 121)  BP: 148/91 (05-01-24 @ 06:00) (114/48 - 186/88)  RR: 20 (05-01-24 @ 06:00) (12 - 22)  SpO2: 96% (05-01-24 @ 06:00) (82% - 99%)    INTAKE AND OUTPUT:     04-30 @ 07:01  -  05-01 @ 07:00  --------------------------------------------------------  IN: 1590 mL / OUT: 1000 mL / NET: 590 mL        PHYSICAL EXAM:  Constitutional: Comfortable . No acute distress.   HEENT: Atraumatic and normocephalic , neck is supple . no JVD. No carotid bruit.  CNS: A&Ox3. No focal deficits.   Respiratory: CTAB, unlabored   Cardiovascular: RRR normal s1 s2. No murmur. No rubs or gallop.  Gastrointestinal: Soft, non-tender. +Bowel sounds.   Extremities: 2+ Peripheral Pulses, No clubbing, cyanosis, or edema  Psychiatric: Calm . no agitation.   Skin: Warm and dry, no ulcers on extremities     LABS:                            11.7   13.00 )-----------( 305      ( 01 May 2024 05:11 )             34.6     05-01    133<L>  |  101  |  21.0<H>  ----------------------------<  113<H>  3.6   |  22.0  |  0.44<L>    Ca    8.4      01 May 2024 05:11  Phos  1.8     05-01  Mg     1.8     05-01        Urinalysis Basic - ( 01 May 2024 05:11 )    Color: x / Appearance: x / SG: x / pH: x  Gluc: 113 mg/dL / Ketone: x  / Bili: x / Urobili: x   Blood: x / Protein: x / Nitrite: x   Leuk Esterase: x / RBC: x / WBC x   Sq Epi: x / Non Sq Epi: x / Bacteria: x              INTERPRETATION OF TELEMETRY:     ECG:   Prior ECG: Yes [  ] No [  ]    RADIOLOGY & ADDITIONAL STUDIES:    X-ray:    CT scan:   MRI:   US:                                                [facility]                                              CARDIOLOGY AT [address], [place] [address]                                             Telephone: [phone]. Fax:[phone]                                                       CARDIOLOGY CONSULTATION NOTE                                                                                             History obtained by: Patient and medical record  Community Cardiologist: Dr. North [phone]   obtained: Yes [  ] No [ x ]  Reason for Consultation: HTN, Afib with ICH  Available out pt records reviewed: Yes [ x ] No [  ]    Chief complaint:    Patient is a 83y old  Female who presents with a chief complaint of IVH (30 Apr 2024 12:59)      HPI:   84 y/o female with PMHx HTN, Breast Ca (dx 2020, surgery, no chemo/radiation) and A Fib (started taking  Eliquisx1 week ago, last dose 4/28 AM) BIBA to [facility]  from home due to headache. Patient had recently been admitted due to HTN after running out of her coreg. CT head found with intraventricular bleed and was transferred to [facility] ED for further evaluation. She was given Andexxa for Eliquis reversal and BP was controlled with nicardipine for SBP > 200s. She recently lost her  and has not been compliant with medications. She is now weaned off nicardipine. She denies chest pain, back pain, SOB, LOVELACE, diaphoresis, syncope or N/V.    CARDIAC TESTING   ECHO: < from: TTE W or WO Ultrasound Enhancing Agent (04.30.24 @ 14:03) >    TRANSTHORACIC ECHOCARDIOGRAM REPORT  ________________________________________________________________________________                                      _______       Pt. Name:       JUMA DOMINGUEZ Study Date:    4/30/2024  MRN:            [id number]    YOB: 1940  Accession #:    [id number]    Age:           83 years  Account#:       [id number]   Gender:        F  Visit ID#  Heart Rate:                  Height:        64.96 in (165.00 cm)  Rhythm:                      Weight:132.28 lb (60.00 kg)  Blood Pressure: 132/93 mmHg  BSA/BMI:       1.66 m² / 22.04 kg/m²  ________________________________________________________________________________________  Referring Physician:    Surendra Smart  Interpreting Physician: Bryan Millan MD  Primary Sonographer:    Immanuel Pate    CPT:               ECHO TTE W/O CON F/U LTD - 85738.m;LIMITED SPECTRAL -                     71584.m;DOPPL ECHO COLOR FLOW - 77809.m  Indication(s):     Abnormal electrocardiogram ECG/EKG - R94.31  Procedure:         Limited transthoracic echocardiogram.  Ordering Location: [facility]  Admission Status:  Inpatient  UEA Reaction:      Patient had a rash after injection of Ultrasound Enhancing                     Agent.  Study Information: Image quality for this study is fair.    _______________________________________________________________________________________     CONCLUSIONS:      1. Limited study f/u for LV function.   2. Left ventricular cavity is normal in size. Left ventricular systolic function is normal with an ejection fraction visually estimated at 55 to 60 %.   3. Normal right ventricular cavity size and normal systolic function.   4. Mild to moderate tricuspid regurgitation.   5. No pericardial effusion seen.   6. Estimated pulmonary artery systolic pressure is 60 mmHg, consistent with severe pulmonary hypertension.    ________________________________________________________________________________________    < end of copied text >      STRESS:    CATH:     ELECTROPHYSIOLOGY:     PAST MEDICAL HISTORY  HTN (hypertension)  HLD (hyperlipidemia)    PAST SURGICAL HISTORY      SOCIAL HISTORY:  Denies smoking/alcohol/drugs  CIGARETTES:     ALCOHOL:  DRUGS:    FAMILY HISTORY:    Family History of Cardiovascular Disease:  Yes [  ] No [ x ]  Coronary Artery Disease in first degree relative: Yes [  ] No [ x ]  Sudden Cardiac Death in First degree relative: Yes [  ] No [ x ]    HOME MEDICATIONS:  anastrozole 1 mg oral tablet: 1 tab(s) orally once a day (29 Apr 2024 09:47)  eliquis:  (29 Apr 2024 09:47)  irbesartan 150 mg oral tablet: 1 tab(s) orally 2 times a day (29 Apr 2024 09:46)  latanoprost 0.005% ophthalmic solution: 1 drop(s) in each eye 2 times a day (29 Apr 2024 09:47)  pravastatin 40 mg oral tablet: 1 tab(s) orally once a day (29 Apr 2024 09:47)  Synthroid 75 mcg (0.075 mg) oral tablet: 1 tab(s) orally once a day (at bedtime) (29 Apr 2024 09:47)      CURRENT CARDIAC MEDICATIONS:  carvedilol 25 milliGRAM(s) Oral every 12 hours  hydrALAZINE Injectable 10 milliGRAM(s) IV Push every 2 hours PRN SBP> 150 and DBP> 90  labetalol Injectable 10 milliGRAM(s) IV Push every 2 hours PRN SBP> 150 and DBP> 90  valsartan 80 milliGRAM(s) Oral daily      CURRENT OTHER MEDICATIONS:  senna 2 Tablet(s) Oral at bedtime PRN Constipation  anastrozole 1 milliGRAM(s) Oral daily  heparin   Injectable 5000 Unit(s) SubCutaneous every 8 hours  influenza  Vaccine (HIGH DOSE) 0.7 milliLiter(s) IntraMuscular once  latanoprost 0.005% Ophthalmic Solution 1 Drop(s) Both EYES at bedtime  levothyroxine 75 MICROGram(s) Oral daily  potassium phosphate IVPB 30 milliMole(s) IV Intermittent once, Stop order after: 1 Doses      ALLERGIES:   No Known Allergies      REVIEW OF SYMPTOMS:   CONSTITUTIONAL: No fever, no chills, no weight loss, no weight gain, no fatigue   ENMT:  No vertigo; No sinus or throat pain  NECK: No pain or stiffness  CARDIOVASCULAR: No chest pain, no dyspnea, no syncope/presyncope, no palpitations, no dizziness, no Orthopnea, no Paroxsymal nocturnal dyspnea  RESPIRATORY: no Shortness of breath, no cough, no wheezing  : No dysuria, no hematuria   GI: No dark color stool, no nausea, no diarrhea, no constipation, no abdominal pain   NEURO: No headache, no slurred speech   MUSCULOSKELETAL: No joint pain or swelling; No muscle, back, or extremity pain  PSYCH: No agitation, no anxiety.    ALL OTHER REVIEW OF SYSTEMS ARE NEGATIVE.    VITAL SIGNS:  T(C): 36.3 (05-01-24 @ 04:02), Max: 36.9 (04-30-24 @ 17:02)  T(F): 97.4 (05-01-24 @ 04:02), Max: 98.4 (04-30-24 @ 17:02)  HR: 121 (05-01-24 @ 06:00) (58 - 121)  BP: 148/91 (05-01-24 @ 06:00) (114/48 - 186/88)  RR: 20 (05-01-24 @ 06:00) (12 - 22)  SpO2: 96% (05-01-24 @ 06:00) (82% - 99%)    INTAKE AND OUTPUT:     04-30 @ 07:01  -  05-01 @ 07:00  --------------------------------------------------------  IN: 1590 mL / OUT: 1000 mL / NET: 590 mL        PHYSICAL EXAM:  Constitutional: Comfortable . No acute distress.   HEENT: Atraumatic and normocephalic , neck is supple . no JVD. No carotid bruit.  CNS: A&Ox3. No focal deficits.   Respiratory: CTAB, unlabored   Cardiovascular: RRR normal s1 s2. No murmur. No rubs or gallop.  Gastrointestinal: Soft, non-tender. +Bowel sounds.   Extremities: 2+ Peripheral Pulses, No clubbing, cyanosis, or edema  Psychiatric: Calm . no agitation.   Skin: Warm and dry, no ulcers on extremities     LABS:                            11.7   13.00 )-----------( 305      ( 01 May 2024 05:11 )             34.6     05-01    133<L>  |  101  |  21.0<H>  ----------------------------<  113<H>  3.6   |  22.0  |  0.44<L>    Ca    8.4      01 May 2024 05:11  Phos  1.8     05-01  Mg     1.8     05-01        Urinalysis Basic - ( 01 May 2024 05:11 )    Color: x / Appearance: x / SG: x / pH: x  Gluc: 113 mg/dL / Ketone: x  / Bili: x / Urobili: x   Blood: x / Protein: x / Nitrite: x   Leuk Esterase: x / RBC: x / WBC x   Sq Epi: x / Non Sq Epi: x / Bacteria: x              INTERPRETATION OF TELEMETRY:     ECG: SB   Prior ECG: Yes [  ] No [  ]    RADIOLOGY & ADDITIONAL STUDIES:    X-ray:    CT scan:   < from: CT Head No Cont (05.01.24 @ 08:30) >  ACC: [id number] EXAM:  CT BRAIN   ORDERED BY: NUPUR ZAZUETA     PROCEDURE DATE:  05/01/2024          INTERPRETATION:  Clinical indication: Headache.    Multiple axial sections were performed from the base skull to vertex   without contrast enhancement. Coronal and significant structures performed    This exam compared prior head CT performed on April 29, 2024.    Parenchymal volume loss and chronic microvessel ischemic changes again   seen.    Intraventricular hemorrhage is again seen involving the lateral third and   fourth ventricles. Dilated ventricles are also again identified.    Evaluation of the osseous with the appropriate window appears unremarkable    The visualized paranasal sinuses mastoid and middle ear regions appear   clear.    IMPRESSION: Stable exam.    --- End of Report ---    < end of copied text >  MRI:   US:< from: US Duplex Venous Lower Ext Complete, Bilateral (04.29.24 @ 08:33) >    ACC: [id number] EXAM:  US DPLX LWR EXT VEINS COMPL BI   ORDERED BY: ROXANNE NIÑO     PROCEDURE DATE:  04/29/2024          INTERPRETATION:  CLINICAL INFORMATION: Status post stroke    COMPARISON: None available.    TECHNIQUE: Duplex sonography ofthe BILATERAL LOWER extremity veins with   color and spectral Doppler, with and without compression.    FINDINGS:    RIGHT:  Normal compressibility of the RIGHT common femoral, femoral and popliteal   veins.  Doppler examination shows normal spontaneous and phasic flow.  No RIGHT calf vein thrombosis is detected.    2.2 x 1.2 x 2.1 cm right Baker's cyst.    LEFT:  Normal compressibility of the LEFT common femoral, femoral and popliteal   veins.  Doppler examination shows normal spontaneous and phasic flow.  No LEFT calf vein thrombosis is detected.    IMPRESSION:  No evidence of deep venous thrombosis in either lower extremity.    Right Baker's cyst.    --- End of Report ---    < end of copied text >    < from: CT Angio Neck Stroke Protocol w/ IV Cont (04.28.24 @ 20:02) >  IMPRESSION:    CTA NECK:  No significant stenosis of the cervical carotid arteries based   on NASCET criteria. Patent cervical vertebral arteries.    CTA HEAD: No high-grade stenosis or occlusion of the major proximal   arterial branches. No evidence of an intracranial arterial aneurysm or   arteriovenous malformation on CTA. No evidence of active bleeding within   the left caudate and intraventricular hemorrhage.    --- End of Report ---    < end of copied text >                                                [facility]                                              CARDIOLOGY AT [address], [place] [address]                                             Telephone: [phone]. Fax:[phone]                                                       CARDIOLOGY CONSULTATION NOTE                                                                                             History obtained by: Patient and medical record  Community Cardiologist: Dr. North [phone]   obtained: Yes [  ] No [ x ]  Reason for Consultation: HTN, Afib with ICH  Available out pt records reviewed: Yes [ x ] No [  ]    Chief complaint:    Patient is a 83y old  Female who presents with a chief complaint of IVH (30 Apr 2024 12:59)      HPI:   82 y/o female with PMHx HTN, Breast Ca (dx 2020, surgery, no chemo/radiation) and A Fib (started taking  Eliquisx1 week ago, last dose 4/28 AM) BIBA to [facility]  from home due to headache. Patient had recently been admitted due to HTN after running out of her coreg. CT head found with intraventricular bleed and was transferred to [facility] ED for further evaluation. She was given Andexxa for Eliquis reversal and BP was controlled with nicardipine for SBP > 200s. She recently lost her  and has not been compliant with medications. She is now weaned off nicardipine. She denies chest pain, back pain, SOB, LOVELACE, diaphoresis, syncope or N/V.    CARDIAC TESTING   ECHO: < from: TTE W or WO Ultrasound Enhancing Agent (04.30.24 @ 14:03) >    TRANSTHORACIC ECHOCARDIOGRAM REPORT  ________________________________________________________________________________                                      _______       Pt. Name:       JUMA DOMINGUEZ Study Date:    4/30/2024  MRN:            [id number]    YOB: 1940  Accession #:    [id number]    Age:           83 years  Account#:       [id number]   Gender:        F  Visit ID#  Heart Rate:                  Height:        64.96 in (165.00 cm)  Rhythm:                      Weight:132.28 lb (60.00 kg)  Blood Pressure: 132/93 mmHg  BSA/BMI:       1.66 m² / 22.04 kg/m²  ________________________________________________________________________________________  Referring Physician:    Surendra Smart  Interpreting Physician: Bryan Millan MD  Primary Sonographer:    Immanuel Pate    CPT:               ECHO TTE W/O CON F/U LTD - 69642.m;LIMITED SPECTRAL -                     22827.m;DOPPL ECHO COLOR FLOW - 30990.m  Indication(s):     Abnormal electrocardiogram ECG/EKG - R94.31  Procedure:         Limited transthoracic echocardiogram.  Ordering Location: [facility]  Admission Status:  Inpatient  UEA Reaction:      Patient had a rash after injection of Ultrasound Enhancing                     Agent.  Study Information: Image quality for this study is fair.    _______________________________________________________________________________________     CONCLUSIONS:      1. Limited study f/u for LV function.   2. Left ventricular cavity is normal in size. Left ventricular systolic function is normal with an ejection fraction visually estimated at 55 to 60 %.   3. Normal right ventricular cavity size and normal systolic function.   4. Mild to moderate tricuspid regurgitation.   5. No pericardial effusion seen.   6. Estimated pulmonary artery systolic pressure is 60 mmHg, consistent with severe pulmonary hypertension.    ________________________________________________________________________________________    < end of copied text >      STRESS:    CATH:     ELECTROPHYSIOLOGY:     PAST MEDICAL HISTORY  HTN (hypertension)  HLD (hyperlipidemia)    PAST SURGICAL HISTORY      SOCIAL HISTORY:  Denies smoking/alcohol/drugs  CIGARETTES:     ALCOHOL:  DRUGS:    FAMILY HISTORY:    Family History of Cardiovascular Disease:  Yes [  ] No [ x ]  Coronary Artery Disease in first degree relative: Yes [  ] No [ x ]  Sudden Cardiac Death in First degree relative: Yes [  ] No [ x ]    HOME MEDICATIONS:  anastrozole 1 mg oral tablet: 1 tab(s) orally once a day (29 Apr 2024 09:47)  eliquis:  (29 Apr 2024 09:47)  irbesartan 150 mg oral tablet: 1 tab(s) orally 2 times a day (29 Apr 2024 09:46)  latanoprost 0.005% ophthalmic solution: 1 drop(s) in each eye 2 times a day (29 Apr 2024 09:47)  pravastatin 40 mg oral tablet: 1 tab(s) orally once a day (29 Apr 2024 09:47)  Synthroid 75 mcg (0.075 mg) oral tablet: 1 tab(s) orally once a day (at bedtime) (29 Apr 2024 09:47)      CURRENT CARDIAC MEDICATIONS:  carvedilol 25 milliGRAM(s) Oral every 12 hours  hydrALAZINE Injectable 10 milliGRAM(s) IV Push every 2 hours PRN SBP> 150 and DBP> 90  labetalol Injectable 10 milliGRAM(s) IV Push every 2 hours PRN SBP> 150 and DBP> 90  valsartan 80 milliGRAM(s) Oral daily      CURRENT OTHER MEDICATIONS:  senna 2 Tablet(s) Oral at bedtime PRN Constipation  anastrozole 1 milliGRAM(s) Oral daily  heparin   Injectable 5000 Unit(s) SubCutaneous every 8 hours  influenza  Vaccine (HIGH DOSE) 0.7 milliLiter(s) IntraMuscular once  latanoprost 0.005% Ophthalmic Solution 1 Drop(s) Both EYES at bedtime  levothyroxine 75 MICROGram(s) Oral daily  potassium phosphate IVPB 30 milliMole(s) IV Intermittent once, Stop order after: 1 Doses      ALLERGIES:   No Known Allergies      REVIEW OF SYMPTOMS:   CONSTITUTIONAL: No fever, no chills, no weight loss, no weight gain, no fatigue   ENMT:  No vertigo; No sinus or throat pain  NECK: No pain or stiffness  CARDIOVASCULAR: No chest pain, no dyspnea, no syncope/presyncope, no palpitations, no dizziness, no Orthopnea, no Paroxsymal nocturnal dyspnea  RESPIRATORY: no Shortness of breath, no cough, no wheezing  : No dysuria, no hematuria   GI: No dark color stool, no nausea, no diarrhea, no constipation, no abdominal pain   NEURO: No headache, no slurred speech   MUSCULOSKELETAL: No joint pain or swelling; No muscle, back, or extremity pain  PSYCH: No agitation, no anxiety.    ALL OTHER REVIEW OF SYSTEMS ARE NEGATIVE.    VITAL SIGNS:  T(C): 36.3 (05-01-24 @ 04:02), Max: 36.9 (04-30-24 @ 17:02)  T(F): 97.4 (05-01-24 @ 04:02), Max: 98.4 (04-30-24 @ 17:02)  HR: 121 (05-01-24 @ 06:00) (58 - 121)  BP: 148/91 (05-01-24 @ 06:00) (114/48 - 186/88)  RR: 20 (05-01-24 @ 06:00) (12 - 22)  SpO2: 96% (05-01-24 @ 06:00) (82% - 99%)    INTAKE AND OUTPUT:     04-30 @ 07:01  -  05-01 @ 07:00  --------------------------------------------------------  IN: 1590 mL / OUT: 1000 mL / NET: 590 mL        PHYSICAL EXAM:  Constitutional: Comfortable . No acute distress.   HEENT: Atraumatic and normocephalic , neck is supple . no JVD. No carotid bruit.  CNS: A&Ox3. No focal deficits.   Respiratory: CTAB, unlabored   Cardiovascular: irreg irregular normal s1 s2. No murmur. No rubs or gallop.  Gastrointestinal: Soft, non-tender. +Bowel sounds.   Extremities: 2+ Peripheral Pulses, No clubbing, cyanosis, or edema  Psychiatric: Calm . no agitation.   Skin: Warm and dry, no ulcers on extremities     LABS:                            11.7   13.00 )-----------( 305      ( 01 May 2024 05:11 )             34.6     05-01    133<L>  |  101  |  21.0<H>  ----------------------------<  113<H>  3.6   |  22.0  |  0.44<L>    Ca    8.4      01 May 2024 05:11  Phos  1.8     05-01  Mg     1.8     05-01        Urinalysis Basic - ( 01 May 2024 05:11 )    Color: x / Appearance: x / SG: x / pH: x  Gluc: 113 mg/dL / Ketone: x  / Bili: x / Urobili: x   Blood: x / Protein: x / Nitrite: x   Leuk Esterase: x / RBC: x / WBC x   Sq Epi: x / Non Sq Epi: x / Bacteria: x              INTERPRETATION OF TELEMETRY:     ECG: SB   Prior ECG: Yes [  ] No [  ]    RADIOLOGY & ADDITIONAL STUDIES:    X-ray:    CT scan:   < from: CT Head No Cont (05.01.24 @ 08:30) >  ACC: [id number] EXAM:  CT BRAIN   ORDERED BY: NUPUR ZAZUETA     PROCEDURE DATE:  05/01/2024          INTERPRETATION:  Clinical indication: Headache.    Multiple axial sections were performed from the base skull to vertex   without contrast enhancement. Coronal and significant structures performed    This exam compared prior head CT performed on April 29, 2024.    Parenchymal volume loss and chronic microvessel ischemic changes again   seen.    Intraventricular hemorrhage is again seen involving the lateral third and   fourth ventricles. Dilated ventricles are also again identified.    Evaluation of the osseous with the appropriate window appears unremarkable    The visualized paranasal sinuses mastoid and middle ear regions appear   clear.    IMPRESSION: Stable exam.    --- End of Report ---    < end of copied text >  MRI:   US:< from: US Duplex Venous Lower Ext Complete, Bilateral (04.29.24 @ 08:33) >    ACC: [id number] EXAM:  US DPLX LWR EXT VEINS COMPL BI   ORDERED BY: ROXANNE NIÑO     PROCEDURE DATE:  04/29/2024          INTERPRETATION:  CLINICAL INFORMATION: Status post stroke    COMPARISON: None available.    TECHNIQUE: Duplex sonography ofthe BILATERAL LOWER extremity veins with   color and spectral Doppler, with and without compression.    FINDINGS:    RIGHT:  Normal compressibility of the RIGHT common femoral, femoral and popliteal   veins.  Doppler examination shows normal spontaneous and phasic flow.  No RIGHT calf vein thrombosis is detected.    2.2 x 1.2 x 2.1 cm right Baker's cyst.    LEFT:  Normal compressibility of the LEFT common femoral, femoral and popliteal   veins.  Doppler examination shows normal spontaneous and phasic flow.  No LEFT calf vein thrombosis is detected.    IMPRESSION:  No evidence of deep venous thrombosis in either lower extremity.    Right Baker's cyst.    --- End of Report ---    < end of copied text >    < from: CT Angio Neck Stroke Protocol w/ IV Cont (04.28.24 @ 20:02) >  IMPRESSION:    CTA NECK:  No significant stenosis of the cervical carotid arteries based   on NASCET criteria. Patent cervical vertebral arteries.    CTA HEAD: No high-grade stenosis or occlusion of the major proximal   arterial branches. No evidence of an intracranial arterial aneurysm or   arteriovenous malformation on CTA. No evidence of active bleeding within   the left caudate and intraventricular hemorrhage.    --- End of Report ---    < end of copied text >                                                [facility]                                              CARDIOLOGY AT [address], [place] [address]                                             Telephone: [phone]. Fax:[phone]                                                       CARDIOLOGY CONSULTATION NOTE                                                                                             History obtained by: Patient and medical record  Community Cardiologist: [facility] cardiology [address] [phone]   obtained: Yes [  ] No [ x ]  Reason for Consultation: HTN, Afib with ICH  Available out pt records reviewed: Yes [ x ] No [  ]    Chief complaint:    Patient is a 83y old  Female who presents with a chief complaint of IVH (30 Apr 2024 12:59)      HPI:   84 y/o female with PMHx HTN, Breast Ca (dx 2020, surgery, no chemo/radiation) and A Fib (started taking  Eliquisx1 week ago, last dose 4/28 AM) BIBA to [facility]  from home due to headache. Patient had recently been admitted due to HTN after running out of her coreg. CT head found with intraventricular bleed and was transferred to [facility] ED for further evaluation. She was given Andexxa for Eliquis reversal and BP was controlled with nicardipine for SBP > 200s. She recently lost her  and has not been compliant with medications. She is now weaned off nicardipine. She denies chest pain, back pain, SOB, LOVELACE, diaphoresis, syncope or N/V.    CARDIAC TESTING   ECHO: < from: TTE W or WO Ultrasound Enhancing Agent (04.30.24 @ 14:03) >    TRANSTHORACIC ECHOCARDIOGRAM REPORT  ________________________________________________________________________________                                      _______       Pt. Name:       JUMA DOMINGUEZ Study Date:    4/30/2024  MRN:            [id number]    YOB: 1940  Accession #:    [id number]    Age:           83 years  Account#:       [id number]   Gender:        F  Visit ID#  Heart Rate:                  Height:        64.96 in (165.00 cm)  Rhythm:                      Weight:132.28 lb (60.00 kg)  Blood Pressure: 132/93 mmHg  BSA/BMI:       1.66 m² / 22.04 kg/m²  ________________________________________________________________________________________  Referring Physician:    Surendra Smart  Interpreting Physician: Bryan Millan MD  Primary Sonographer:    Immanuel Pate    CPT:               ECHO TTE W/O CON F/U LTD - 56152.m;LIMITED SPECTRAL -                     86695.m;DOPPL ECHO COLOR FLOW - 29277.m  Indication(s):     Abnormal electrocardiogram ECG/EKG - R94.31  Procedure:         Limited transthoracic echocardiogram.  Ordering Location: [facility]  Admission Status:  Inpatient  UEA Reaction:      Patient had a rash after injection of Ultrasound Enhancing                     Agent.  Study Information: Image quality for this study is fair.    _______________________________________________________________________________________     CONCLUSIONS:      1. Limited study f/u for LV function.   2. Left ventricular cavity is normal in size. Left ventricular systolic function is normal with an ejection fraction visually estimated at 55 to 60 %.   3. Normal right ventricular cavity size and normal systolic function.   4. Mild to moderate tricuspid regurgitation.   5. No pericardial effusion seen.   6. Estimated pulmonary artery systolic pressure is 60 mmHg, consistent with severe pulmonary hypertension.    ________________________________________________________________________________________    < end of copied text >      STRESS:    CATH:     ELECTROPHYSIOLOGY:     PAST MEDICAL HISTORY  HTN (hypertension)  HLD (hyperlipidemia)    PAST SURGICAL HISTORY      SOCIAL HISTORY:  Denies smoking/alcohol/drugs  CIGARETTES:     ALCOHOL:  DRUGS:    FAMILY HISTORY:    Family History of Cardiovascular Disease:  Yes [  ] No [ x ]  Coronary Artery Disease in first degree relative: Yes [  ] No [ x ]  Sudden Cardiac Death in First degree relative: Yes [  ] No [ x ]    HOME MEDICATIONS:  anastrozole 1 mg oral tablet: 1 tab(s) orally once a day (29 Apr 2024 09:47)  eliquis:  (29 Apr 2024 09:47)  irbesartan 150 mg oral tablet: 1 tab(s) orally 2 times a day (29 Apr 2024 09:46)  latanoprost 0.005% ophthalmic solution: 1 drop(s) in each eye 2 times a day (29 Apr 2024 09:47)  pravastatin 40 mg oral tablet: 1 tab(s) orally once a day (29 Apr 2024 09:47)  Synthroid 75 mcg (0.075 mg) oral tablet: 1 tab(s) orally once a day (at bedtime) (29 Apr 2024 09:47)      CURRENT CARDIAC MEDICATIONS:  carvedilol 25 milliGRAM(s) Oral every 12 hours  hydrALAZINE Injectable 10 milliGRAM(s) IV Push every 2 hours PRN SBP> 150 and DBP> 90  labetalol Injectable 10 milliGRAM(s) IV Push every 2 hours PRN SBP> 150 and DBP> 90  valsartan 80 milliGRAM(s) Oral daily      CURRENT OTHER MEDICATIONS:  senna 2 Tablet(s) Oral at bedtime PRN Constipation  anastrozole 1 milliGRAM(s) Oral daily  heparin   Injectable 5000 Unit(s) SubCutaneous every 8 hours  influenza  Vaccine (HIGH DOSE) 0.7 milliLiter(s) IntraMuscular once  latanoprost 0.005% Ophthalmic Solution 1 Drop(s) Both EYES at bedtime  levothyroxine 75 MICROGram(s) Oral daily  potassium phosphate IVPB 30 milliMole(s) IV Intermittent once, Stop order after: 1 Doses      ALLERGIES:   No Known Allergies      REVIEW OF SYMPTOMS:   CONSTITUTIONAL: No fever, no chills, no weight loss, no weight gain, no fatigue   ENMT:  No vertigo; No sinus or throat pain  NECK: No pain or stiffness  CARDIOVASCULAR: No chest pain, no dyspnea, no syncope/presyncope, no palpitations, no dizziness, no Orthopnea, no Paroxsymal nocturnal dyspnea  RESPIRATORY: no Shortness of breath, no cough, no wheezing  : No dysuria, no hematuria   GI: No dark color stool, no nausea, no diarrhea, no constipation, no abdominal pain   NEURO: No headache, no slurred speech   MUSCULOSKELETAL: No joint pain or swelling; No muscle, back, or extremity pain  PSYCH: No agitation, no anxiety.    ALL OTHER REVIEW OF SYSTEMS ARE NEGATIVE.    VITAL SIGNS:  T(C): 36.3 (05-01-24 @ 04:02), Max: 36.9 (04-30-24 @ 17:02)  T(F): 97.4 (05-01-24 @ 04:02), Max: 98.4 (04-30-24 @ 17:02)  HR: 121 (05-01-24 @ 06:00) (58 - 121)  BP: 148/91 (05-01-24 @ 06:00) (114/48 - 186/88)  RR: 20 (05-01-24 @ 06:00) (12 - 22)  SpO2: 96% (05-01-24 @ 06:00) (82% - 99%)    INTAKE AND OUTPUT:     04-30 @ 07:01  -  05-01 @ 07:00  --------------------------------------------------------  IN: 1590 mL / OUT: 1000 mL / NET: 590 mL        PHYSICAL EXAM:  Constitutional: Comfortable . No acute distress.   HEENT: Atraumatic and normocephalic , neck is supple . no JVD. No carotid bruit.  CNS: A&Ox3. No focal deficits.   Respiratory: CTAB, unlabored   Cardiovascular: irreg irregular normal s1 s2. No murmur. No rubs or gallop.  Gastrointestinal: Soft, non-tender. +Bowel sounds.   Extremities: 2+ Peripheral Pulses, No clubbing, cyanosis, or edema  Psychiatric: Calm . no agitation.   Skin: Warm and dry, no ulcers on extremities     LABS:                            11.7   13.00 )-----------( 305      ( 01 May 2024 05:11 )             34.6     05-01    133<L>  |  101  |  21.0<H>  ----------------------------<  113<H>  3.6   |  22.0  |  0.44<L>    Ca    8.4      01 May 2024 05:11  Phos  1.8     05-01  Mg     1.8     05-01        Urinalysis Basic - ( 01 May 2024 05:11 )    Color: x / Appearance: x / SG: x / pH: x  Gluc: 113 mg/dL / Ketone: x  / Bili: x / Urobili: x   Blood: x / Protein: x / Nitrite: x   Leuk Esterase: x / RBC: x / WBC x   Sq Epi: x / Non Sq Epi: x / Bacteria: x              INTERPRETATION OF TELEMETRY:     ECG: SB   Prior ECG: Yes [  ] No [  ]    RADIOLOGY & ADDITIONAL STUDIES:    X-ray:    CT scan:   < from: CT Head No Cont (05.01.24 @ 08:30) >  ACC: [id number] EXAM:  CT BRAIN   ORDERED BY: NUPUR ZAZUETA     PROCEDURE DATE:  05/01/2024          INTERPRETATION:  Clinical indication: Headache.    Multiple axial sections were performed from the base skull to vertex   without contrast enhancement. Coronal and significant structures performed    This exam compared prior head CT performed on April 29, 2024.    Parenchymal volume loss and chronic microvessel ischemic changes again   seen.    Intraventricular hemorrhage is again seen involving the lateral third and   fourth ventricles. Dilated ventricles are also again identified.    Evaluation of the osseous with the appropriate window appears unremarkable    The visualized paranasal sinuses mastoid and middle ear regions appear   clear.    IMPRESSION: Stable exam.    --- End of Report ---    < end of copied text >  MRI:   US:< from: US Duplex Venous Lower Ext Complete, Bilateral (04.29.24 @ 08:33) >    ACC: [id number] EXAM:  US DPLX LWR EXT VEINS COMPL BI   ORDERED BY: ROXANNE NIÑO     PROCEDURE DATE:  04/29/2024          INTERPRETATION:  CLINICAL INFORMATION: Status post stroke    COMPARISON: None available.    TECHNIQUE: Duplex sonography ofthe BILATERAL LOWER extremity veins with   color and spectral Doppler, with and without compression.    FINDINGS:    RIGHT:  Normal compressibility of the RIGHT common femoral, femoral and popliteal   veins.  Doppler examination shows normal spontaneous and phasic flow.  No RIGHT calf vein thrombosis is detected.    2.2 x 1.2 x 2.1 cm right Baker's cyst.    LEFT:  Normal compressibility of the LEFT common femoral, femoral and popliteal   veins.  Doppler examination shows normal spontaneous and phasic flow.  No LEFT calf vein thrombosis is detected.    IMPRESSION:  No evidence of deep venous thrombosis in either lower extremity.    Right Baker's cyst.    --- End of Report ---    < end of copied text >    < from: CT Angio Neck Stroke Protocol w/ IV Cont (04.28.24 @ 20:02) >  IMPRESSION:    CTA NECK:  No significant stenosis of the cervical carotid arteries based   on NASCET criteria. Patent cervical vertebral arteries.    CTA HEAD: No high-grade stenosis or occlusion of the major proximal   arterial branches. No evidence of an intracranial arterial aneurysm or   arteriovenous malformation on CTA. No evidence of active bleeding within   the left caudate and intraventricular hemorrhage.    --- End of Report ---    < end of copied text >

## 2024-05-01 NOTE — CONSULT NOTE ADULT - ASSESSMENT
82 y/o female with PMHx HTN, Breast Ca (dx 2020, surgery, no chemo/radiation) and A Fib (started taking  Eliquisx1 week ago, last dose 4/28 AM) BIBA to [facility]  from home due to headache. Patient had recently been admitted due to HTN after running out of her coreg. CT head found with intraventricular bleed and was transferred to [facility] ED for further evaluation. She was given Andexxa for Eliquis reversal and BP was controlled with nicardipine for SBP > 200s. She recently lost her  and has not been compliant with medications. She is now weaned off nicardipine. She denies chest pain, back pain, SOB, LOVELACE, diaphoresis, syncope or N/V. Of note patient endorses that she had fallen and hit her head a couple of days before initiation of Eliquis but did not communicate to team at that time.

## 2024-05-01 NOTE — PROGRESS NOTE ADULT - SUBJECTIVE AND OBJECTIVE BOX
HPI:  82 y/o female with PMHx HTN and A Fib (started taking  Eliquisx1 week ago, last dose 4/28 AM) BIBA from home due to headache, dx with IVH.    Pt c/o feeling tired. Offers no other complaints.     Vital Signs Last 24 Hrs  T(C): 36.3 (01 May 2024 04:02), Max: 36.9 (30 Apr 2024 17:02)  T(F): 97.4 (01 May 2024 04:02), Max: 98.4 (30 Apr 2024 17:02)  HR: 112 (01 May 2024 12:00) (58 - 125)  BP: 134/90 (01 May 2024 12:00) (114/48 - 186/88)  BP(mean): 104 (01 May 2024 12:00) (68 - 104)  RR: 16 (01 May 2024 12:00) (12 - 23)  SpO2: 97% (01 May 2024 12:00) (95% - 99%)  Parameters below as of 01 May 2024 08:00  Patient On (Oxygen Delivery Method): room air    PHYSICAL EXAM:  GENERAL: NAD, well-groomed, sleepy  HEAD:  Atraumatic, normocephalic  HERMANN COMA SCORE: E4- V4- M6- = 13   MENTAL STATUS: alert and oriented to person; Awake- Opens eyes spontaneously, Appropriately conversant without aphasia. following simple commands  CRANIAL NERVES: PERRL. Facial sensation intact V1-3 distribution b/l. Face symmetric w/ normal eye closure and smile, tongue midline. Speech clear. shoulder shrug intact.   MOTOR: strength 5/5 b/l upper and lower extremities  SENSATION: grossly intact to light touch all extremities  COORDINATION: gait not visualized  CHEST/LUNG: unlabored breathing    LABS:                        11.7   13.00 )-----------( 305      ( 01 May 2024 05:11 )             34.6     05-01    133<L>  |  101  |  21.0<H>  ----------------------------<  113<H>  3.6   |  22.0  |  0.44<L>    Ca    8.4      01 May 2024 05:11  Phos  1.8     05-01  Mg     1.8     05-01    Urinalysis Basic - ( 01 May 2024 05:11 )  Color: x / Appearance: x / SG: x / pH: x  Gluc: 113 mg/dL / Ketone: x  / Bili: x / Urobili: x   Blood: x / Protein: x / Nitrite: x   Leuk Esterase: x / RBC: x / WBC x   Sq Epi: x / Non Sq Epi: x / Bacteria: x    04-30 @ 07:01 - 05-01 @ 07:00  --------------------------------------------------------  IN: 1590 mL / OUT: 1000 mL / NET: 590 mL    05-01 @ 07:01 - 05-01 @ 12:56  --------------------------------------------------------  IN: 50 mL / OUT: 0 mL / NET: 50 mL      RADIOLOGY & ADDITIONAL TESTS:  < from: CT Head No Cont (05.01.24 @ 08:30) >  ACC: [id number] EXAM:  CT BRAIN   ORDERED BY: NUPUR ZAZUETA   PROCEDURE DATE:  05/01/2024    INTERPRETATION:  Clinical indication: Headache.  Multiple axial sections were performed from the base skull to vertex   without contrast enhancement. Coronal and significant structures performed  This exam compared prior head CT performed on April 29, 2024.  Parenchymal volume loss and chronic microvessel ischemic changes again   seen.  Intraventricular hemorrhage is again seen involving the lateral third and   fourth ventricles. Dilated ventricles are also again identified.  Evaluation of the osseous with the appropriate window appears unremarkable  The visualized paranasal sinuses mastoid and middle ear regions appear   clear.  IMPRESSION: Stable exam.  --- End of Report ---  SANIA GODFREY MD; Attending Radiologist  This document has been electronically signed. May  1 2024  8:52AM  < end of copied text >    < from: US Duplex Venous Lower Ext Complete, Bilateral (04.29.24 @ 08:33) >  ACC: [id number] EXAM:  US DPLX LWR EXT VEINS COMPL BI   ORDERED BY: ROXANNE NIÑO   PROCEDURE DATE:  04/29/2024    INTERPRETATION:  CLINICAL INFORMATION: Status post stroke  COMPARISON: None available.  TECHNIQUE: Duplex sonography ofthe BILATERAL LOWER extremity veins with   color and spectral Doppler, with and without compression.  FINDINGS:  RIGHT:  Normal compressibility of the RIGHT common femoral, femoral and popliteal   veins.  Doppler examination shows normal spontaneous and phasic flow.  No RIGHT calf vein thrombosis is detected.  2.2 x 1.2 x 2.1 cm right Baker's cyst.  LEFT:  Normal compressibility of the LEFT common femoral, femoral and popliteal   veins.  Doppler examination shows normal spontaneous and phasic flow.  No LEFT calf vein thrombosis is detected.  IMPRESSION:  No evidence of deep venous thrombosis in either lower extremity.  Right Baker's cyst.  --- End of Report --  PETER OROZCO MD; Attending Radiologist  This document has been electronically signed. Apr 29 2024  8:43AM  < end of copied text >

## 2024-05-01 NOTE — PROGRESS NOTE ADULT - SUBJECTIVE AND OBJECTIVE BOX
Preliminary note, official recommendations pending attending review/signature     [facility] Stroke Team  Progress Note     HPI:  82 y/o female with PMHx HTN and A Fib (reportedly only started taking Eliquis x 1 week prior to admission, last dose 4/28 AM), hx of Breast CA? on Anastrazole, who presented to [place] ED complaining of headache on 4/28/24. Patient is a poor historian so hx obtained from chart. Per notes from [place], EMS reported SBP > 200 upon arrival to ED. Upon arrival to [place], CTH was performed and showed an intraventricular hemorrhage. Patient was reversed w/ Andexxa and started on cardene gtt, transferred to [facility] for further workup and care. Patient reportedly lost her  in January and had been taking care of him, and has been having difficulty taking care of herself since his passing. ICH 3 on admission. Patient admitted to Neuro ICU for further monitoring and care.     SUBJECTIVE: Patient seen and examined at bedside. Overnight patient complained of headache, no neurological change, tylenol 650mg PO x 1 dose No new neurologic complaints.  ROS reported negative unless otherwise noted.    MEDICATIONS:  anastrozole 1 milliGRAM(s) Oral daily  heparin   Injectable 5000 Unit(s) SubCutaneous every 8 hours  hydrALAZINE Injectable 10 milliGRAM(s) IV Push every 2 hours PRN  influenza  Vaccine (HIGH DOSE) 0.7 milliLiter(s) IntraMuscular once  labetalol Injectable 10 milliGRAM(s) IV Push every 2 hours PRN  latanoprost 0.005% Ophthalmic Solution 1 Drop(s) Both EYES at bedtime  levothyroxine 75 MICROGram(s) Oral daily  metoprolol tartrate 75 milliGRAM(s) Oral every 8 hours  senna 2 Tablet(s) Oral at bedtime PRN  sodium chloride 1 Gram(s) Oral daily  valsartan 80 milliGRAM(s) Oral daily      Vital Signs Last 24 Hrs  T(C): 36.3 (01 May 2024 04:02), Max: 36.9 (30 Apr 2024 17:02)  T(F): 97.4 (01 May 2024 04:02), Max: 98.4 (30 Apr 2024 17:02)  HR: 112 (01 May 2024 12:00) (58 - 125)  BP: 134/90 (01 May 2024 12:00) (132/75 - 186/88)  BP(mean): 104 (01 May 2024 12:00) (81 - 104)  RR: 16 (01 May 2024 12:00) (12 - 23)  SpO2: 97% (01 May 2024 12:00) (95% - 99%)    Parameters below as of 01 May 2024 08:00  Patient On (Oxygen Delivery Method): room air      PHYSICAL EXAM:  General: No acute distress.   HEENT: Head normocephalic, atraumatic. Conjunctivae clear w/o exudates or hemorrhage. Sclera non-icteric. Nares are patent bilaterally. Mucous membranes pink and moist.  No tonsillar swelling or exudates.    Neck: Supple, no adenopathy. Trachea midline. No JVD.  Cardiac: Normal rate and irregular rhythm. S1, S2 auscultated. No murmurs, gallops, or rubs.     Respiratory: Lung sounds clear in all fields. Chest wall symmetric, nontender.   Abdominal: Soft, nondistended, nontender. Bowel sounds normoactive x 4 quadrants. No masses, hepatomegaly, or splenomegaly.   Skin: Skin is warm, dry and intact without rashes or lesions. Appropriate color for ethnicity. Nailbeds pink with no cyanosis or clubbing.   Extremities: No edema, 2+ peripheral pulses in b/l upper and b/l lower extremities. Full range of motion in all joints.     NEUROLOGICAL EXAM:  Mental status: Awake, alert, oriented x 2-3, oriented to self, place, and month, disoriented to year, states "2084". States her correct age. Speech fluent, follows commands, no neglect, normal memory   Cranial Nerves: No facial asymmetry, no nystagmus, no dysarthria,  tongue midline  Motor exam: Normal tone, no drift, 5/5 RUE, 5/5 RLE, 5/5 LUE, 5/5 LLE, normal fine finger movements.  Sensation: Intact to light touch   Coordination/ Gait: No dysmetria, gait not tested    LABS:                        11.7   13.00 )-----------( 305      ( 01 May 2024 05:11 )             34.6    05-01    133<L>  |  101  |  21.0<H>  ----------------------------<  113<H>  3.6   |  22.0  |  0.44<L>    Ca    8.4      01 May 2024 05:11  Phos  1.8     05-01  Mg     1.8     05-01          IMAGING:        Preliminary note, official recommendations pending attending review/signature     [facility] Stroke Team  Progress Note     HPI:  82 y/o female with PMHx HTN and A Fib (reportedly only started taking Eliquis x 1 week prior to admission, last dose 4/28 AM), hx of Breast CA? on Anastrazole, who presented to [place] ED complaining of headache on 4/28/24. Patient is a poor historian so hx obtained from chart. Per notes from [place], EMS reported SBP > 200 upon arrival to ED. Upon arrival to [facility] [place], CTH was performed and showed an intraventricular hemorrhage. Patient was reversed w/ Andexxa and started on cardene gtt, transferred to [facility] for further workup and care. Patient reportedly lost her  in January and had been taking care of him, and has been having difficulty taking care of herself since his passing. ICH 3 on admission. Patient admitted to Neuro ICU for further monitoring and care.     SUBJECTIVE: Patient seen and examined at bedside. Overnight patient complained of headache, no neurological change, tylenol 650mg PO x 1 dose given. CT Head ordered by medicine team, CTH stable. No new neurologic complaints.  ROS reported negative unless otherwise noted.    MEDICATIONS:  anastrozole 1 milliGRAM(s) Oral daily  heparin   Injectable 5000 Unit(s) SubCutaneous every 8 hours  hydrALAZINE Injectable 10 milliGRAM(s) IV Push every 2 hours PRN  influenza  Vaccine (HIGH DOSE) 0.7 milliLiter(s) IntraMuscular once  labetalol Injectable 10 milliGRAM(s) IV Push every 2 hours PRN  latanoprost 0.005% Ophthalmic Solution 1 Drop(s) Both EYES at bedtime  levothyroxine 75 MICROGram(s) Oral daily  metoprolol tartrate 75 milliGRAM(s) Oral every 8 hours  senna 2 Tablet(s) Oral at bedtime PRN  sodium chloride 1 Gram(s) Oral daily  valsartan 80 milliGRAM(s) Oral daily      Vital Signs Last 24 Hrs  T(C): 36.3 (01 May 2024 04:02), Max: 36.9 (30 Apr 2024 17:02)  T(F): 97.4 (01 May 2024 04:02), Max: 98.4 (30 Apr 2024 17:02)  HR: 112 (01 May 2024 12:00) (58 - 125)  BP: 134/90 (01 May 2024 12:00) (132/75 - 186/88)  BP(mean): 104 (01 May 2024 12:00) (81 - 104)  RR: 16 (01 May 2024 12:00) (12 - 23)  SpO2: 97% (01 May 2024 12:00) (95% - 99%)    Parameters below as of 01 May 2024 08:00  Patient On (Oxygen Delivery Method): room air      PHYSICAL EXAM:  General: No acute distress.   HEENT: Head normocephalic, atraumatic. Conjunctivae clear w/o exudates or hemorrhage. Sclera non-icteric. Nares are patent bilaterally. Mucous membranes pink and moist.  No tonsillar swelling or exudates.    Neck: Supple, no adenopathy. Trachea midline. No JVD.  Cardiac: Normal rate and irregular rhythm. S1, S2 auscultated. No murmurs, gallops, or rubs.     Respiratory: Lung sounds clear in all fields. Chest wall symmetric, nontender.   Abdominal: Soft, nondistended, nontender. Bowel sounds normoactive x 4 quadrants. No masses, hepatomegaly, or splenomegaly.   Skin: Skin is warm, dry and intact without rashes or lesions. Appropriate color for ethnicity. Nailbeds pink with no cyanosis or clubbing.   Extremities: No edema, 2+ peripheral pulses in b/l upper and b/l lower extremities. Full range of motion in all joints.     NEUROLOGICAL EXAM:  Mental status: Awake, alert, oriented x 2-3, oriented to self, place, and month, disoriented to year, states "2084". States her correct age. Speech fluent, follows commands, no neglect, normal memory   Cranial Nerves: No facial asymmetry, no nystagmus, no dysarthria,  tongue midline  Motor exam: Normal tone, no drift, 5/5 RUE, 5/5 RLE, 5/5 LUE, 5/5 LLE, normal fine finger movements.  Sensation: Intact to light touch   Coordination/ Gait: No dysmetria, gait not tested    LABS:                        11.7   13.00 )-----------( 305      ( 01 May 2024 05:11 )             34.6    05-01    133<L>  |  101  |  21.0<H>  ----------------------------<  113<H>  3.6   |  22.0  |  0.44<L>    Ca    8.4      01 May 2024 05:11  Phos  1.8     05-01  Mg     1.8     05-01    LDL 60  HgA1C 5.3      IMAGING:     CT Head No Cont (05.01.24 @ 08:30)   IMPRESSION: Stable exam.    CT Head No Cont (04.29.24 @ 20:33)   IMPRESSION: Stable acute small left caudate head intraparenchymal   hemorrhage measuring 6 mm. Moderate intraventricular hemorrhage   throughout the left lateral ventricle, third ventricle and fourth   ventricle. Mild intraventricular hemorrhage in the right lateral   ventricle. Mild to moderate hydrocephalus, unchanged..    US Duplex Venous Lower Ext Complete, Bilateral (04.29.24 @ 08:33)  IMPRESSION:  No evidence of deep venous thrombosis in either lower extremity.  Right Baker's cyst.    CT Head No Cont (04.28.24 @ 22:53)  IMPRESSION:  No change inintraventricular hemorrhage and dilatation of the lateral   and third ventricles compared with prior exam. Ill-defined hemorrhage   again seen in the left caudate body and left caudate head.    CT Angio Brain Stroke Protocol  w/ IV Cont (04.28.24 @ 20:02)  IMPRESSION:  CTA NECK:  No significant stenosis of the cervical carotid arteries based   on NASCET criteria. Patent cervical vertebral arteries.    CTA HEAD: No high-grade stenosis or occlusion of the major proximal   arterial branches. No evidence of an intracranial arterial aneurysm or   arteriovenous malformation on CTA. No evidence of active bleeding within   the left caudate and intraventricular hemorrhage.    CT Brain Stroke Protocol (04.28.24 @ 20:02)  IMPRESSION:  Intraventricular hemorrhage, as described above. Ill-defined parenchymal   hemorrhage within the left caudate body and caudate head. Mild dilatation   of the lateral and third ventricles.   Preliminary note, official recommendations pending attending review/signature     [facility] Stroke Team  Progress Note     HPI:  84 y/o female with PMHx HTN and A Fib (reportedly only started taking Eliquis x 1 week prior to admission, last dose 4/28 AM), hx of Breast CA? on Anastrazole, who presented to [place] ED complaining of headache on 4/28/24. Patient is a poor historian so hx obtained from chart. Per notes from [place], EMS reported SBP > 200 upon arrival to ED. Upon arrival to [place], CTH was performed and showed an intraventricular hemorrhage. Patient was reversed w/ Andexxa and started on cardene gtt, transferred to [facility] for further workup and care. Patient reportedly lost her  in January and had been taking care of him, and has been having difficulty taking care of herself since his passing. ICH 3 on admission. Patient admitted to Neuro ICU for further monitoring and care.     SUBJECTIVE: Patient seen and examined at bedside. Overnight patient complained of headache, no neurological change, tylenol 650mg PO x 1 dose given. CT Head ordered by medicine team, CTH stable. Patient also required hydralazine 10mg IVP PRN x 3 doses for HTN, cardiology consulted to optimize antihypertensive regimen. No new neurologic complaints.  ROS reported negative unless otherwise noted.    MEDICATIONS:  anastrozole 1 milliGRAM(s) Oral daily  heparin   Injectable 5000 Unit(s) SubCutaneous every 8 hours  hydrALAZINE Injectable 10 milliGRAM(s) IV Push every 2 hours PRN  influenza  Vaccine (HIGH DOSE) 0.7 milliLiter(s) IntraMuscular once  labetalol Injectable 10 milliGRAM(s) IV Push every 2 hours PRN  latanoprost 0.005% Ophthalmic Solution 1 Drop(s) Both EYES at bedtime  levothyroxine 75 MICROGram(s) Oral daily  metoprolol tartrate 75 milliGRAM(s) Oral every 8 hours  senna 2 Tablet(s) Oral at bedtime PRN  sodium chloride 1 Gram(s) Oral daily  valsartan 80 milliGRAM(s) Oral daily      Vital Signs Last 24 Hrs  T(C): 36.3 (01 May 2024 04:02), Max: 36.9 (30 Apr 2024 17:02)  T(F): 97.4 (01 May 2024 04:02), Max: 98.4 (30 Apr 2024 17:02)  HR: 112 (01 May 2024 12:00) (58 - 125)  BP: 134/90 (01 May 2024 12:00) (132/75 - 186/88)  BP(mean): 104 (01 May 2024 12:00) (81 - 104)  RR: 16 (01 May 2024 12:00) (12 - 23)  SpO2: 97% (01 May 2024 12:00) (95% - 99%)    Parameters below as of 01 May 2024 08:00  Patient On (Oxygen Delivery Method): room air      PHYSICAL EXAM:  General: No acute distress.   HEENT: Head normocephalic, atraumatic. Conjunctivae clear w/o exudates or hemorrhage. Sclera non-icteric. Nares are patent bilaterally. Mucous membranes pink and moist.  No tonsillar swelling or exudates.    Neck: Supple, no adenopathy. Trachea midline. No JVD.  Cardiac: Normal rate and irregular rhythm. S1, S2 auscultated. No murmurs, gallops, or rubs.     Respiratory: Lung sounds clear in all fields. Chest wall symmetric, nontender.   Abdominal: Soft, nondistended, nontender. Bowel sounds normoactive x 4 quadrants. No masses, hepatomegaly, or splenomegaly.   Skin: Skin is warm, dry and intact without rashes or lesions. Appropriate color for ethnicity. Nailbeds pink with no cyanosis or clubbing.   Extremities: No edema, 2+ peripheral pulses in b/l upper and b/l lower extremities. Full range of motion in all joints.     NEUROLOGICAL EXAM:  Mental status: Awake, alert, oriented x 2-3, oriented to self, place, and month, disoriented to year, states "2084". States her correct age. Speech fluent, follows commands, no neglect, normal memory   Cranial Nerves: No facial asymmetry, no nystagmus, no dysarthria,  tongue midline  Motor exam: Normal tone, no drift, 5/5 RUE, 5/5 RLE, 5/5 LUE, 5/5 LLE, normal fine finger movements.  Sensation: Intact to light touch   Coordination/ Gait: No dysmetria, gait not tested    LABS:                        11.7   13.00 )-----------( 305      ( 01 May 2024 05:11 )             34.6    05-01    133<L>  |  101  |  21.0<H>  ----------------------------<  113<H>  3.6   |  22.0  |  0.44<L>    Ca    8.4      01 May 2024 05:11  Phos  1.8     05-01  Mg     1.8     05-01    LDL 60  HgA1C 5.3      IMAGING:     CT Head No Cont (05.01.24 @ 08:30)   IMPRESSION: Stable exam.    CT Head No Cont (04.29.24 @ 20:33)   IMPRESSION: Stable acute small left caudate head intraparenchymal   hemorrhage measuring 6 mm. Moderate intraventricular hemorrhage   throughout the left lateral ventricle, third ventricle and fourth   ventricle. Mild intraventricular hemorrhage in the right lateral   ventricle. Mild to moderate hydrocephalus, unchanged..    US Duplex Venous Lower Ext Complete, Bilateral (04.29.24 @ 08:33)  IMPRESSION:  No evidence of deep venous thrombosis in either lower extremity.  Right Baker's cyst.    CT Head No Cont (04.28.24 @ 22:53)  IMPRESSION:  No change inintraventricular hemorrhage and dilatation of the lateral   and third ventricles compared with prior exam. Ill-defined hemorrhage   again seen in the left caudate body and left caudate head.    CT Angio Brain Stroke Protocol  w/ IV Cont (04.28.24 @ 20:02)  IMPRESSION:  CTA NECK:  No significant stenosis of the cervical carotid arteries based   on NASCET criteria. Patent cervical vertebral arteries.    CTA HEAD: No high-grade stenosis or occlusion of the major proximal   arterial branches. No evidence of an intracranial arterial aneurysm or   arteriovenous malformation on CTA. No evidence of active bleeding within   the left caudate and intraventricular hemorrhage.    CT Brain Stroke Protocol (04.28.24 @ 20:02)  IMPRESSION:  Intraventricular hemorrhage, as described above. Ill-defined parenchymal   hemorrhage within the left caudate body and caudate head. Mild dilatation   of the lateral and third ventricles.   Preliminary note, official recommendations pending attending review/signature     [facility] Stroke Team  Progress Note     HPI:  82 y/o female with PMHx HTN and A Fib (reportedly only started taking Eliquis x 1 week prior to admission, last dose 4/28 AM), hx of Breast CA? on Anastrazole, who presented to [place] ED complaining of headache on 4/28/24. Patient is a poor historian so hx obtained from chart. Per notes from [place], EMS reported SBP > 200 upon arrival to ED. Upon arrival to [place], CTH was performed and showed an intraventricular hemorrhage. Patient was reversed w/ Andexxa and started on cardene gtt, transferred to [facility] for further workup and care. Patient reportedly lost her  in January and had been taking care of him, and has been having difficulty taking care of herself since his passing. ICH 3 on admission. Patient admitted to Neuro ICU for further monitoring and care.     SUBJECTIVE: Patient seen and examined at bedside. Overnight patient complained of headache, no neurological change, tylenol 650mg PO x 1 dose given. CT Head ordered by medicine team, CTH stable. Patient also required hydralazine 10mg IVP PRN x 3 doses for SBP > 180 mmHg, cardiology consulted to optimize antihypertensive regimen. No new neurologic complaints.  ROS reported negative unless otherwise noted.    MEDICATIONS:  anastrozole 1 milliGRAM(s) Oral daily  heparin   Injectable 5000 Unit(s) SubCutaneous every 8 hours  hydrALAZINE Injectable 10 milliGRAM(s) IV Push every 2 hours PRN  influenza  Vaccine (HIGH DOSE) 0.7 milliLiter(s) IntraMuscular once  labetalol Injectable 10 milliGRAM(s) IV Push every 2 hours PRN  latanoprost 0.005% Ophthalmic Solution 1 Drop(s) Both EYES at bedtime  levothyroxine 75 MICROGram(s) Oral daily  metoprolol tartrate 75 milliGRAM(s) Oral every 8 hours  senna 2 Tablet(s) Oral at bedtime PRN  sodium chloride 1 Gram(s) Oral daily  valsartan 80 milliGRAM(s) Oral daily      Vital Signs Last 24 Hrs  T(C): 36.3 (01 May 2024 04:02), Max: 36.9 (30 Apr 2024 17:02)  T(F): 97.4 (01 May 2024 04:02), Max: 98.4 (30 Apr 2024 17:02)  HR: 112 (01 May 2024 12:00) (58 - 125)  BP: 134/90 (01 May 2024 12:00) (132/75 - 186/88)  BP(mean): 104 (01 May 2024 12:00) (81 - 104)  RR: 16 (01 May 2024 12:00) (12 - 23)  SpO2: 97% (01 May 2024 12:00) (95% - 99%)    Parameters below as of 01 May 2024 08:00  Patient On (Oxygen Delivery Method): room air      PHYSICAL EXAM:  General: No acute distress.   HEENT: Head normocephalic, atraumatic. Conjunctivae clear w/o exudates or hemorrhage. Sclera non-icteric. Nares are patent bilaterally. Mucous membranes pink and moist.  No tonsillar swelling or exudates.    Neck: Supple, no adenopathy. Trachea midline. No JVD.  Cardiac: Normal rate and irregular rhythm. S1, S2 auscultated. No murmurs, gallops, or rubs.     Respiratory: Lung sounds clear in all fields. Chest wall symmetric, nontender.   Abdominal: Soft, nondistended, nontender. Bowel sounds normoactive x 4 quadrants. No masses, hepatomegaly, or splenomegaly.   Skin: Skin is warm, dry and intact without rashes or lesions. Appropriate color for ethnicity. Nailbeds pink with no cyanosis or clubbing.   Extremities: No edema, 2+ peripheral pulses in b/l upper and b/l lower extremities. Full range of motion in all joints.     NEUROLOGICAL EXAM:  Mental status: Awake, alert, oriented x 2-3, oriented to self, place, and month, disoriented to year, states "2084". States her correct age. Speech fluent, follows commands, no neglect, normal memory   Cranial Nerves: No facial asymmetry, no nystagmus, no dysarthria,  tongue midline  Motor exam: Normal tone, no drift, 5/5 RUE, 5/5 RLE, 5/5 LUE, 5/5 LLE, normal fine finger movements.  Sensation: Intact to light touch   Coordination/ Gait: No dysmetria, gait not tested    LABS:                        11.7   13.00 )-----------( 305      ( 01 May 2024 05:11 )             34.6    05-01    133<L>  |  101  |  21.0<H>  ----------------------------<  113<H>  3.6   |  22.0  |  0.44<L>    Ca    8.4      01 May 2024 05:11  Phos  1.8     05-01  Mg     1.8     05-01    LDL 60  HgA1C 5.3      IMAGING:     CT Head No Cont (05.01.24 @ 08:30)   IMPRESSION: Stable exam.    CT Head No Cont (04.29.24 @ 20:33)   IMPRESSION: Stable acute small left caudate head intraparenchymal   hemorrhage measuring 6 mm. Moderate intraventricular hemorrhage   throughout the left lateral ventricle, third ventricle and fourth   ventricle. Mild intraventricular hemorrhage in the right lateral   ventricle. Mild to moderate hydrocephalus, unchanged..    US Duplex Venous Lower Ext Complete, Bilateral (04.29.24 @ 08:33)  IMPRESSION:  No evidence of deep venous thrombosis in either lower extremity.  Right Baker's cyst.    CT Head No Cont (04.28.24 @ 22:53)  IMPRESSION:  No change inintraventricular hemorrhage and dilatation of the lateral   and third ventricles compared with prior exam. Ill-defined hemorrhage   again seen in the left caudate body and left caudate head.    CT Angio Brain Stroke Protocol  w/ IV Cont (04.28.24 @ 20:02)  IMPRESSION:  CTA NECK:  No significant stenosis of the cervical carotid arteries based   on NASCET criteria. Patent cervical vertebral arteries.    CTA HEAD: No high-grade stenosis or occlusion of the major proximal   arterial branches. No evidence of an intracranial arterial aneurysm or   arteriovenous malformation on CTA. No evidence of active bleeding within   the left caudate and intraventricular hemorrhage.    CT Brain Stroke Protocol (04.28.24 @ 20:02)  IMPRESSION:  Intraventricular hemorrhage, as described above. Ill-defined parenchymal   hemorrhage within the left caudate body and caudate head. Mild dilatation   of the lateral and third ventricles.       [facility] Stroke Team  Progress Note     HPI:  84 y/o female with PMHx HTN and A Fib (reportedly only started taking Eliquis x 1 week prior to admission, last dose 4/28 AM), hx of Breast CA? on Anastrazole, who presented to [place] ED complaining of headache on 4/28/24. Patient is a poor historian so hx obtained from chart. Per notes from [place], EMS reported SBP > 200 upon arrival to ED. Upon arrival to [place], CTH was performed and showed an intraventricular hemorrhage. Patient was reversed w/ Andexxa and started on cardene gtt, transferred to [facility] for further workup and care. Patient reportedly lost her  in January and had been taking care of him, and has been having difficulty taking care of herself since his passing. ICH 3 on admission. Patient admitted to Neuro ICU for further monitoring and care.     SUBJECTIVE: Patient seen and examined at bedside. Overnight patient complained of headache, no neurological change, tylenol 650mg PO x 1 dose given. CT Head ordered by medicine team, CTH stable. Patient also required hydralazine 10mg IVP PRN x 3 doses for SBP > 180 mmHg, cardiology consulted to optimize antihypertensive regimen. No new neurologic complaints.  ROS reported negative unless otherwise noted.    MEDICATIONS:  anastrozole 1 milliGRAM(s) Oral daily  heparin   Injectable 5000 Unit(s) SubCutaneous every 8 hours  hydrALAZINE Injectable 10 milliGRAM(s) IV Push every 2 hours PRN  influenza  Vaccine (HIGH DOSE) 0.7 milliLiter(s) IntraMuscular once  labetalol Injectable 10 milliGRAM(s) IV Push every 2 hours PRN  latanoprost 0.005% Ophthalmic Solution 1 Drop(s) Both EYES at bedtime  levothyroxine 75 MICROGram(s) Oral daily  metoprolol tartrate 75 milliGRAM(s) Oral every 8 hours  senna 2 Tablet(s) Oral at bedtime PRN  sodium chloride 1 Gram(s) Oral daily  valsartan 80 milliGRAM(s) Oral daily      Vital Signs Last 24 Hrs  T(C): 36.3 (01 May 2024 04:02), Max: 36.9 (30 Apr 2024 17:02)  T(F): 97.4 (01 May 2024 04:02), Max: 98.4 (30 Apr 2024 17:02)  HR: 112 (01 May 2024 12:00) (58 - 125)  BP: 134/90 (01 May 2024 12:00) (132/75 - 186/88)  BP(mean): 104 (01 May 2024 12:00) (81 - 104)  RR: 16 (01 May 2024 12:00) (12 - 23)  SpO2: 97% (01 May 2024 12:00) (95% - 99%)    Parameters below as of 01 May 2024 08:00  Patient On (Oxygen Delivery Method): room air      PHYSICAL EXAM:  General: No acute distress.   HEENT: Head normocephalic, atraumatic. Conjunctivae clear w/o exudates or hemorrhage. Sclera non-icteric. Nares are patent bilaterally. Mucous membranes pink and moist.  No tonsillar swelling or exudates.    Neck: Supple, no adenopathy. Trachea midline. No JVD.  Cardiac: Normal rate and irregular rhythm. S1, S2 auscultated. No murmurs, gallops, or rubs.     Respiratory: Lung sounds clear in all fields. Chest wall symmetric, nontender.   Abdominal: Soft, nondistended, nontender. Bowel sounds normoactive x 4 quadrants. No masses, hepatomegaly, or splenomegaly.   Skin: Skin is warm, dry and intact without rashes or lesions. Appropriate color for ethnicity. Nailbeds pink with no cyanosis or clubbing.   Extremities: No edema, 2+ peripheral pulses in b/l upper and b/l lower extremities. Full range of motion in all joints.     NEUROLOGICAL EXAM:  Mental status: Awake, alert, oriented x 2-3, oriented to self, place, and month, disoriented to year, states "2084". States her correct age. Speech fluent, follows commands, no neglect, normal memory   Cranial Nerves: No facial asymmetry, no nystagmus, no dysarthria,  tongue midline  Motor exam: Normal tone, no drift, 5/5 RUE, 5/5 RLE, 5/5 LUE, 5/5 LLE, normal fine finger movements.  Sensation: Intact to light touch   Coordination/ Gait: No dysmetria, gait not tested    LABS:                        11.7   13.00 )-----------( 305      ( 01 May 2024 05:11 )             34.6    05-01    133<L>  |  101  |  21.0<H>  ----------------------------<  113<H>  3.6   |  22.0  |  0.44<L>    Ca    8.4      01 May 2024 05:11  Phos  1.8     05-01  Mg     1.8     05-01    LDL 60  HgA1C 5.3      IMAGING:     CT Head No Cont (05.01.24 @ 08:30)   IMPRESSION: Stable exam.    CT Head No Cont (04.29.24 @ 20:33)   IMPRESSION: Stable acute small left caudate head intraparenchymal   hemorrhage measuring 6 mm. Moderate intraventricular hemorrhage   throughout the left lateral ventricle, third ventricle and fourth   ventricle. Mild intraventricular hemorrhage in the right lateral   ventricle. Mild to moderate hydrocephalus, unchanged..    US Duplex Venous Lower Ext Complete, Bilateral (04.29.24 @ 08:33)  IMPRESSION:  No evidence of deep venous thrombosis in either lower extremity.  Right Baker's cyst.    CT Head No Cont (04.28.24 @ 22:53)  IMPRESSION:  No change inintraventricular hemorrhage and dilatation of the lateral   and third ventricles compared with prior exam. Ill-defined hemorrhage   again seen in the left caudate body and left caudate head.    CT Angio Brain Stroke Protocol  w/ IV Cont (04.28.24 @ 20:02)  IMPRESSION:  CTA NECK:  No significant stenosis of the cervical carotid arteries based   on NASCET criteria. Patent cervical vertebral arteries.    CTA HEAD: No high-grade stenosis or occlusion of the major proximal   arterial branches. No evidence of an intracranial arterial aneurysm or   arteriovenous malformation on CTA. No evidence of active bleeding within   the left caudate and intraventricular hemorrhage.    CT Brain Stroke Protocol (04.28.24 @ 20:02)  IMPRESSION:  Intraventricular hemorrhage, as described above. Ill-defined parenchymal   hemorrhage within the left caudate body and caudate head. Mild dilatation   of the lateral and third ventricles.

## 2024-05-01 NOTE — CONSULT NOTE ADULT - PROBLEM SELECTOR RECOMMENDATION 2
.  - Goal SBP under 150, range -150  - remains in Afib change coreg to metoprolol as above  - increase valsartan 80mg PO daily

## 2024-05-01 NOTE — PROGRESS NOTE ADULT - ASSESSMENT
ASSESSMENT:  82 y/o female with PMHx HTN and A Fib (reportedly only started taking Eliquis x 1 week prior to admission, last dose 4/28 AM), hx of Breast CA? on Anastrazole, who presented to [facility] ED complaining of headache on 4/28/24. Patient is a poor historian so hx obtained from chart. Per notes from [facility], EMS reported SBP > 200 upon arrival to ED. Upon arrival to [facility], CTH was performed and showed an intraventricular hemorrhage. Patient was reversed w/ Andexxa and started on cardene gtt, transferred to [facility] for further workup and care.    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  Patient has a hx of noncompliance to meds.      NEURO:  -Neurologically patient with ongoing improvement  -Continue close monitoring for neurological deterioration  -Stroke neuro checks q 2 hours/4 hours  -Pending MR Head No Cont / MR Head as noted  -Permissive HTN, SBP Goal ____  -ANTITHROMBOTIC THERAPY:  -STATIN THERAPY:  -LDL    CARDIAC:  -VS q 2 hours/4 hours  -Pending TTE / TTE as noted: EF  -12-Lead ECG  -Troponin/BNP  -Continue cardiac monitoring pending findings of further workup, +/- ILR    PULM:  -Patient tolerating room air, saturating well  -CXR:  -Continue to maintain spO2 > 94%    HEME:  -H/H   -Platelets  -Continue to monitor, CBC q daily  -DVT ppx:    GI:  -Abdominal exam benign, patient with no complaints  -Diet:  -Bowel regimen: Miralax and senna  -GI ppx:    RENAL/METABOLIC:  -BUN/Cr __, GFR  -Patient voiding well w/o difficulty, continue I&O's / Keller in place, draining well, continue I&O's  -Electrolytes WNL, replete PRN    ENDOCRINE:  -A1C  -TSH/T3/T4    ID:  -Afebrile, no leukocytosis, no indication of infection/sepsis  -Continue to monitor for signs/symptoms of infection  -F/u blood cx / urine cx  -ABX regimen    OTHER: Condition discussed w/ patient and family at bedside.    DISPO:         -------------  PLAN:  Neuro: IPH/IVH, Disorientation, Hydrocephalus  - close monitoring and frequent neurochecks for signs of neurologic deterioration   - STAT head CT for any neurologic change, especially given patient has hydrocephalus  - Will need serial imaging to monitor hydrocephalus  - head of bed at 30 degrees   - check MRI of the brain w/wo  - blood pressure control with goal blood pressure under 150/90 (goal range 130-150 systolic)   - hold all antiplatelets/anticoagulants.  Eventually will need to be restarted on at minimum antiplatelets, but would hold of at least 14 days.  Anticoagulation only to be restarted if BP remains stable and if she can demonstrate compliance (with current mental status, likely NO)  - maintain normovolemia, normoglycemia, normonatremia, and normothermia   - consider restarting pharmacologic DVT prophylaxis  - consult PT/OT/SLP    Cardio:  HTN emergency on admission  - Cont. Diovan for now. Was on Irbesartan at home  - Cont. Coreg 25mg BID  - Hydralazine prn  - TTE wnl    Heme: Anemia  - Monitor H&H  - LE duplex     Renal: Hyponatremia  - Monitor Na      OK to transfer patient to Stroke service   ASSESSMENT:  82 y/o female with PMHx HTN and A Fib (reportedly only started taking Eliquis x 1 week prior to admission, last dose 4/28 AM), hx of Breast CA? on Anastrazole, who presented to [facility] ED complaining of headache on 4/28/24. Patient is a poor historian so hx obtained from chart. Per notes from [facility], EMS reported SBP > 200 upon arrival to ED. Upon arrival to [facility], CTH was performed and showed an intraventricular hemorrhage. Patient was reversed w/ Andexxa and started on cardene gtt, transferred to [facility] for further workup and care.    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 Patient has a hx of noncompliance to meds.    NEURO:  #Acute intraventricular hemorrhage  -Neurologically patient with ongoing improvement  -Continue close monitoring for neurological deterioration  -Stroke neuro checks q 2 hours  -Pending MR Head w/wo IV Cont  -Permissive HTN, SBP Goal 130-150 mmHg, maintain BP under 150/90 mmHg  -ANTITHROMBOTIC THERAPY: Not indicated at this time given IVH, hold all AP/AC, eventually will need to be restarted on at minimum antiplatelets, but would hold of at least 14 days.  Anticoagulation only to be restarted if BP remains stable and if she can demonstrate compliance.  -STATIN THERAPY: Not indicated at this time given IVH, LDL 60  -LDL 60   -STAT head CT for any neurologic change, especially given patient has hydrocephalus  -DYSPHAGIA: PASS   -Will need serial imaging to monitor hydrocephalus  -Head of bed at 30 degrees   -PT/OT/Speech eval and treatment     CARDIAC:  #Atrial fibrillation #HTN  -VS q 2 hours  -TTE as noted: EF 55-60%  -Continue cardiac monitoring pending findings of further workup, +/- ILR    PULM:  -Patient tolerating room air, saturating well  -Continue to maintain spO2 > 94%    HEME:  -H/H 11.7/34.6   -Platelets 305  -Continue to monitor, CBC q daily  -DVT ppx: Heparin subcutaneous q 8h    GI:  -Abdominal exam benign, patient with no complaints  -Diet: Regular  -Bowel regimen: Senna PO at bedtime PRN constipation    RENAL/METABOLIC:  #Hypophosphatemia #Hyponatremia   -BUN/Cr 21.0/0.44, GFR 95  -Patient voiding well w/o difficulty, continue I&O's  -Phosphorus 1.8, repleted  -Persistent hyponatremia, Sodium 133, patient started on sodium chloride 1g q daily  -Magnesium 1.8, 2g IVPB ordered, goal Mag > 2.0  -All other electrolytes WNL, replete PRN    ENDOCRINE:  -A1C 5.3  -TSH 1.24    ID:  -Afebrile, no leukocytosis, no indication of infection/sepsis  -Continue to monitor for signs/symptoms of infection    OTHER: Condition discussed w/ patient and family at bedside.    DISPO: Pending clinical course        -------------      Cardio:  HTN emergency on admission  - Cont. Diovan for now. Was on Irbesartan at home  - Cont. Coreg 25mg BID  - Hydralazine prn  - TTE wnl    Heme: Anemia  - Monitor H&H  - LE duplex     Renal: Hyponatremia  - Monitor Na      OK to transfer patient to Stroke service   ASSESSMENT:  82 y/o female with PMHx HTN and A Fib (reportedly only started taking Eliquis x 1 week prior to admission, last dose 4/28 AM), hx of Breast CA? on Anastrazole, who presented to [facility] ED complaining of headache on 4/28/24. Patient is a poor historian so hx obtained from chart. Per notes from [facility], EMS reported SBP > 200 upon arrival to ED. Upon arrival to [facility], CTH was performed and showed an intraventricular hemorrhage. Patient was reversed w/ Andexxa and started on cardene gtt, transferred to [facility] for further workup and care.    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 Patient has a hx of noncompliance to meds.    NEURO:  #Acute intraventricular hemorrhage  -Neurologically patient with ongoing improvement  -Continue close monitoring for neurological deterioration  -Stroke neuro checks q 2 hours  -Pending MR Head w/wo IV Cont  -Permissive HTN, SBP Goal 130-150 mmHg, maintain BP under 150/90 mmHg  -ANTITHROMBOTIC THERAPY: Not indicated at this time given IVH, hold all AP/AC, eventually will need to be restarted on at minimum antiplatelets, but would hold of at least 14 days.  Anticoagulation only to be restarted if BP remains stable and if she can demonstrate compliance.  -STATIN THERAPY: Not indicated at this time given IVH, LDL 60  -LDL 60   -STAT head CT for any neurologic change, especially given patient has hydrocephalus  -DYSPHAGIA: PASS   -Will need serial imaging to monitor hydrocephalus  -Head of bed at 30 degrees   -PT/OT/Speech eval and treatment     CARDIAC:  #Atrial fibrillation #Hypertensive emergency #HTN  -VS q 2 hours  -TTE as noted: EF 55-60%  -Cardiology c/s for recommendations in management   -Continue cardiac monitoring pending findings of further workup    PULM:  -Patient tolerating room air, saturating well  -Continue to maintain spO2 > 94%    HEME:  -H/H 11.7/34.6   -Platelets 305  -Continue to monitor, CBC q daily  -US Duplex Bilateral Lower Extremity Venous negative for evidence of DVT  -DVT ppx: Heparin subcutaneous q 8h    GI:  -Abdominal exam benign, patient with no complaints  -Diet: Regular  -Bowel regimen: Senna PO at bedtime PRN constipation    RENAL/METABOLIC:  #Hypophosphatemia #Hyponatremia   -BUN/Cr 21.0/0.44, GFR 95  -Patient voiding well w/o difficulty, continue I&O's  -Phosphorus 1.8, repleted  -Persistent hyponatremia, Sodium 133, patient started on sodium chloride 1g q daily  -Magnesium 1.8, 2g IVPB ordered, goal Mag > 2.0  -All other electrolytes WNL, replete PRN    ENDOCRINE:  -A1C 5.3  -TSH 1.24    ID:  -Afebrile, no leukocytosis, no indication of infection/sepsis  -Continue to monitor for signs/symptoms of infection    OTHER: Condition discussed w/ patient and family at bedside.    DISPO: Pending clinical course      CORE MEASURES:        Admission NIHSS: 0     Tenecteplase : [] YES [x] NO      LDL/A1C: 60/5.3     Depression Screen- if depression hx and/or present      Statin Therapy: Not indicated at this time, IVH     Dysphagia Screen: [x] PASS [] FAIL     Smoking [] YES [x] NO      Afib [x] YES [] NO     Stroke Education [x] YES [] NO   ASSESSMENT:  82 y/o female with PMHx HTN and A Fib (reportedly only started taking Eliquis x 1 week prior to admission, last dose 4/28 AM), hx of Breast CA? on Anastrazole, who presented to [place] ED complaining of headache on 4/28/24. Patient is a poor historian so hx obtained from chart. Per notes from [place], EMS reported SBP > 200 upon arrival to ED. Upon arrival to [place], CTH was performed and showed an intraventricular hemorrhage. Patient was reversed w/ Andexxa and started on cardene gtt, transferred to [facility] for further workup and care.    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 Patient has a hx of noncompliance to meds.    NEURO:  #Acute intraventricular hemorrhage  -Neurologically patient with ongoing improvement  -Continue close monitoring for neurological deterioration  -Stroke neuro checks q 2 hours  -Pending MR Head w/wo IV Cont  -Permissive HTN, SBP Goal 130-150 mmHg, maintain BP under 150/90 mmHg  -ANTITHROMBOTIC THERAPY: Not indicated at this time given IVH, hold all AP/AC, eventually will need to be restarted on at minimum antiplatelets, but would hold of at least 14 days.  Anticoagulation only to be restarted if BP remains stable and if she can demonstrate compliance. Per cardio note: CHADSVasc 4, but currently with acute cerebral bleeding and with recent fall injury/trauma not ideal candidate for long term AC use, consider elective outpatient LAAO     -STATIN THERAPY: Not indicated at this time given IVH, LDL 60  -LDL 60   -STAT head CT for any neurologic change, especially given patient has hydrocephalus  -DYSPHAGIA: PASS   -Will need serial imaging to monitor hydrocephalus  -Head of bed at 30 degrees   -PT/OT/Speech eval and treatment     CARDIAC:  #Atrial fibrillation #Hypertensive emergency #HTN  -VS q 2 hours  -TTE as noted: EF 55-60%  -Cardiology c/s for recommendations in management   -Continue cardiac monitoring pending findings of further workup    PULM:  -Patient tolerating room air, saturating well  -Continue to maintain spO2 > 94%    HEME:  -H/H 11.7/34.6   -Platelets 305  -Continue to monitor, CBC q daily  -US Duplex Bilateral Lower Extremity Venous negative for evidence of DVT  -DVT ppx: Heparin subcutaneous q 8h    GI:  -Abdominal exam benign, patient with no complaints  -Diet: Regular  -Bowel regimen: Senna PO at bedtime PRN constipation    RENAL/METABOLIC:  #Hypophosphatemia #Hyponatremia   -BUN/Cr 21.0/0.44, GFR 95  -Patient voiding well w/o difficulty, continue I&O's  -Phosphorus 1.8, repleted  -Persistent hyponatremia, Sodium 133, patient started on sodium chloride 1g q daily  -Magnesium 1.8, 2g IVPB ordered, goal Mag > 2.0  -All other electrolytes WNL, replete PRN    ENDOCRINE:  -A1C 5.3  -TSH 1.24    ID:  -Afebrile, no leukocytosis, no indication of infection/sepsis  -Continue to monitor for signs/symptoms of infection    OTHER: Condition discussed w/ patient and family at bedside.    DISPO: Pending clinical course      CORE MEASURES:        Admission NIHSS: 0     Tenecteplase : [] YES [x] NO      LDL/A1C: 60/5.3     Depression Screen- if depression hx and/or present      Statin Therapy: Not indicated at this time, IVH     Dysphagia Screen: [x] PASS [] FAIL     Smoking [] YES [x] NO      Afib [x] YES [] NO     Stroke Education [x] YES [] NO

## 2024-05-02 LAB
ALBUMIN SERPL ELPH-MCNC: 3.5 G/DL — SIGNIFICANT CHANGE UP (ref 3.3–5.2)
ALP SERPL-CCNC: 68 U/L — SIGNIFICANT CHANGE UP (ref 40–120)
ALT FLD-CCNC: 25 U/L — SIGNIFICANT CHANGE UP
ANION GAP SERPL CALC-SCNC: 10 MMOL/L — SIGNIFICANT CHANGE UP (ref 5–17)
ANION GAP SERPL CALC-SCNC: 13 MMOL/L — SIGNIFICANT CHANGE UP (ref 5–17)
ANION GAP SERPL CALC-SCNC: 15 MMOL/L — SIGNIFICANT CHANGE UP (ref 5–17)
APPEARANCE UR: CLEAR — SIGNIFICANT CHANGE UP
AST SERPL-CCNC: 16 U/L — SIGNIFICANT CHANGE UP
BACTERIA # UR AUTO: NEGATIVE /HPF — SIGNIFICANT CHANGE UP
BILIRUB SERPL-MCNC: 1.3 MG/DL — SIGNIFICANT CHANGE UP (ref 0.4–2)
BILIRUB UR-MCNC: NEGATIVE — SIGNIFICANT CHANGE UP
BUN SERPL-MCNC: 15.8 MG/DL — SIGNIFICANT CHANGE UP (ref 8–20)
BUN SERPL-MCNC: 21.2 MG/DL — HIGH (ref 8–20)
BUN SERPL-MCNC: 21.6 MG/DL — HIGH (ref 8–20)
CALCIUM SERPL-MCNC: 8.5 MG/DL — SIGNIFICANT CHANGE UP (ref 8.4–10.5)
CALCIUM SERPL-MCNC: 8.7 MG/DL — SIGNIFICANT CHANGE UP (ref 8.4–10.5)
CALCIUM SERPL-MCNC: 8.7 MG/DL — SIGNIFICANT CHANGE UP (ref 8.4–10.5)
CAST: 1 /LPF — SIGNIFICANT CHANGE UP (ref 0–4)
CHLORIDE SERPL-SCNC: 90 MMOL/L — LOW (ref 96–108)
CHLORIDE SERPL-SCNC: 90 MMOL/L — LOW (ref 96–108)
CHLORIDE SERPL-SCNC: 94 MMOL/L — LOW (ref 96–108)
CO2 SERPL-SCNC: 21 MMOL/L — LOW (ref 22–29)
CO2 SERPL-SCNC: 22 MMOL/L — SIGNIFICANT CHANGE UP (ref 22–29)
CO2 SERPL-SCNC: 23 MMOL/L — SIGNIFICANT CHANGE UP (ref 22–29)
COLOR SPEC: YELLOW — SIGNIFICANT CHANGE UP
CREAT SERPL-MCNC: 0.38 MG/DL — LOW (ref 0.5–1.3)
CREAT SERPL-MCNC: 0.38 MG/DL — LOW (ref 0.5–1.3)
CREAT SERPL-MCNC: 0.52 MG/DL — SIGNIFICANT CHANGE UP (ref 0.5–1.3)
DIFF PNL FLD: NEGATIVE — SIGNIFICANT CHANGE UP
EGFR: 91 ML/MIN/1.73M2 — SIGNIFICANT CHANGE UP
EGFR: 98 ML/MIN/1.73M2 — SIGNIFICANT CHANGE UP
EGFR: 98 ML/MIN/1.73M2 — SIGNIFICANT CHANGE UP
GLUCOSE BLDC GLUCOMTR-MCNC: 112 MG/DL — HIGH (ref 70–99)
GLUCOSE SERPL-MCNC: 115 MG/DL — HIGH (ref 70–99)
GLUCOSE SERPL-MCNC: 118 MG/DL — HIGH (ref 70–99)
GLUCOSE SERPL-MCNC: 162 MG/DL — HIGH (ref 70–99)
GLUCOSE UR QL: NEGATIVE MG/DL — SIGNIFICANT CHANGE UP
HCT VFR BLD CALC: 36.9 % — SIGNIFICANT CHANGE UP (ref 34.5–45)
HGB BLD-MCNC: 12.9 G/DL — SIGNIFICANT CHANGE UP (ref 11.5–15.5)
KETONES UR-MCNC: ABNORMAL MG/DL
LEUKOCYTE ESTERASE UR-ACNC: NEGATIVE — SIGNIFICANT CHANGE UP
MAGNESIUM SERPL-MCNC: 1.9 MG/DL — SIGNIFICANT CHANGE UP (ref 1.6–2.6)
MCHC RBC-ENTMCNC: 30.1 PG — SIGNIFICANT CHANGE UP (ref 27–34)
MCHC RBC-ENTMCNC: 35 GM/DL — SIGNIFICANT CHANGE UP (ref 32–36)
MCV RBC AUTO: 86 FL — SIGNIFICANT CHANGE UP (ref 80–100)
NITRITE UR-MCNC: NEGATIVE — SIGNIFICANT CHANGE UP
OSMOLALITY UR: 471 MOSM/KG — SIGNIFICANT CHANGE UP (ref 300–1000)
PH UR: 7 — SIGNIFICANT CHANGE UP (ref 5–8)
PHOSPHATE SERPL-MCNC: 2.6 MG/DL — SIGNIFICANT CHANGE UP (ref 2.4–4.7)
PLATELET # BLD AUTO: 356 K/UL — SIGNIFICANT CHANGE UP (ref 150–400)
POTASSIUM SERPL-MCNC: 3.5 MMOL/L — SIGNIFICANT CHANGE UP (ref 3.5–5.3)
POTASSIUM SERPL-MCNC: 3.5 MMOL/L — SIGNIFICANT CHANGE UP (ref 3.5–5.3)
POTASSIUM SERPL-MCNC: 3.6 MMOL/L — SIGNIFICANT CHANGE UP (ref 3.5–5.3)
POTASSIUM SERPL-SCNC: 3.5 MMOL/L — SIGNIFICANT CHANGE UP (ref 3.5–5.3)
POTASSIUM SERPL-SCNC: 3.5 MMOL/L — SIGNIFICANT CHANGE UP (ref 3.5–5.3)
POTASSIUM SERPL-SCNC: 3.6 MMOL/L — SIGNIFICANT CHANGE UP (ref 3.5–5.3)
PROT SERPL-MCNC: 6 G/DL — LOW (ref 6.6–8.7)
PROT UR-MCNC: SIGNIFICANT CHANGE UP MG/DL
RBC # BLD: 4.29 M/UL — SIGNIFICANT CHANGE UP (ref 3.8–5.2)
RBC # FLD: 13.9 % — SIGNIFICANT CHANGE UP (ref 10.3–14.5)
RBC CASTS # UR COMP ASSIST: 1 /HPF — SIGNIFICANT CHANGE UP (ref 0–4)
SODIUM SERPL-SCNC: 125 MMOL/L — LOW (ref 135–145)
SODIUM SERPL-SCNC: 126 MMOL/L — LOW (ref 135–145)
SODIUM SERPL-SCNC: 127 MMOL/L — LOW (ref 135–145)
SODIUM UR-SCNC: 105 MMOL/L — SIGNIFICANT CHANGE UP
SP GR SPEC: 1.01 — SIGNIFICANT CHANGE UP (ref 1–1.03)
SQUAMOUS # UR AUTO: 0 /HPF — SIGNIFICANT CHANGE UP (ref 0–5)
UROBILINOGEN FLD QL: 1 MG/DL — SIGNIFICANT CHANGE UP (ref 0.2–1)
WBC # BLD: 16.15 K/UL — HIGH (ref 3.8–10.5)
WBC # FLD AUTO: 16.15 K/UL — HIGH (ref 3.8–10.5)
WBC UR QL: 1 /HPF — SIGNIFICANT CHANGE UP (ref 0–5)

## 2024-05-02 PROCEDURE — 99233 SBSQ HOSP IP/OBS HIGH 50: CPT

## 2024-05-02 PROCEDURE — 99233 SBSQ HOSP IP/OBS HIGH 50: CPT | Mod: FS

## 2024-05-02 RX ORDER — SODIUM CHLORIDE 9 MG/ML
1 INJECTION INTRAMUSCULAR; INTRAVENOUS; SUBCUTANEOUS ONCE
Refills: 0 | Status: DISCONTINUED | OUTPATIENT
Start: 2024-05-02 | End: 2024-05-02

## 2024-05-02 RX ORDER — HYDRALAZINE HCL 50 MG
25 TABLET ORAL THREE TIMES A DAY
Refills: 0 | Status: DISCONTINUED | OUTPATIENT
Start: 2024-05-02 | End: 2024-05-02

## 2024-05-02 RX ORDER — VALSARTAN 80 MG/1
80 TABLET ORAL
Refills: 0 | Status: DISCONTINUED | OUTPATIENT
Start: 2024-05-02 | End: 2024-05-08

## 2024-05-02 RX ORDER — HYDRALAZINE HCL 50 MG
25 TABLET ORAL EVERY 8 HOURS
Refills: 0 | Status: DISCONTINUED | OUTPATIENT
Start: 2024-05-02 | End: 2024-05-02

## 2024-05-02 RX ORDER — SODIUM CHLORIDE 5 G/100ML
1000 INJECTION, SOLUTION INTRAVENOUS
Refills: 0 | Status: DISCONTINUED | OUTPATIENT
Start: 2024-05-02 | End: 2024-05-03

## 2024-05-02 RX ORDER — SODIUM CHLORIDE 9 MG/ML
1 INJECTION INTRAMUSCULAR; INTRAVENOUS; SUBCUTANEOUS THREE TIMES A DAY
Refills: 0 | Status: DISCONTINUED | OUTPATIENT
Start: 2024-05-02 | End: 2024-05-02

## 2024-05-02 RX ORDER — SODIUM CHLORIDE 9 MG/ML
1000 INJECTION INTRAMUSCULAR; INTRAVENOUS; SUBCUTANEOUS
Refills: 0 | Status: DISCONTINUED | OUTPATIENT
Start: 2024-05-02 | End: 2024-05-02

## 2024-05-02 RX ORDER — SODIUM CHLORIDE 9 MG/ML
2 INJECTION INTRAMUSCULAR; INTRAVENOUS; SUBCUTANEOUS
Refills: 0 | Status: DISCONTINUED | OUTPATIENT
Start: 2024-05-02 | End: 2024-05-02

## 2024-05-02 RX ORDER — ACETAMINOPHEN 500 MG
650 TABLET ORAL EVERY 6 HOURS
Refills: 0 | Status: DISCONTINUED | OUTPATIENT
Start: 2024-05-02 | End: 2024-05-13

## 2024-05-02 RX ORDER — SODIUM CHLORIDE 9 MG/ML
2 INJECTION INTRAMUSCULAR; INTRAVENOUS; SUBCUTANEOUS THREE TIMES A DAY
Refills: 0 | Status: DISCONTINUED | OUTPATIENT
Start: 2024-05-02 | End: 2024-05-02

## 2024-05-02 RX ORDER — METOPROLOL TARTRATE 50 MG
5 TABLET ORAL ONCE
Refills: 0 | Status: COMPLETED | OUTPATIENT
Start: 2024-05-02 | End: 2024-05-02

## 2024-05-02 RX ADMIN — SODIUM CHLORIDE 30 MILLILITER(S): 5 INJECTION, SOLUTION INTRAVENOUS at 23:21

## 2024-05-02 RX ADMIN — SODIUM CHLORIDE 1 GRAM(S): 9 INJECTION INTRAMUSCULAR; INTRAVENOUS; SUBCUTANEOUS at 13:23

## 2024-05-02 RX ADMIN — Medication 10 MILLIGRAM(S): at 02:09

## 2024-05-02 RX ADMIN — HEPARIN SODIUM 5000 UNIT(S): 5000 INJECTION INTRAVENOUS; SUBCUTANEOUS at 06:00

## 2024-05-02 RX ADMIN — Medication 10 MILLIGRAM(S): at 08:40

## 2024-05-02 RX ADMIN — Medication 75 MICROGRAM(S): at 06:00

## 2024-05-02 RX ADMIN — VALSARTAN 80 MILLIGRAM(S): 80 TABLET ORAL at 21:21

## 2024-05-02 RX ADMIN — Medication 10 MILLIGRAM(S): at 18:45

## 2024-05-02 RX ADMIN — LATANOPROST 1 DROP(S): 0.05 SOLUTION/ DROPS OPHTHALMIC; TOPICAL at 21:21

## 2024-05-02 RX ADMIN — Medication 5 MILLIGRAM(S): at 13:43

## 2024-05-02 RX ADMIN — Medication 25 MILLIGRAM(S): at 13:22

## 2024-05-02 RX ADMIN — HEPARIN SODIUM 5000 UNIT(S): 5000 INJECTION INTRAVENOUS; SUBCUTANEOUS at 21:21

## 2024-05-02 RX ADMIN — Medication 75 MILLIGRAM(S): at 05:43

## 2024-05-02 RX ADMIN — Medication 25 MILLIGRAM(S): at 11:14

## 2024-05-02 RX ADMIN — ANASTROZOLE 1 MILLIGRAM(S): 1 TABLET ORAL at 11:14

## 2024-05-02 RX ADMIN — VALSARTAN 80 MILLIGRAM(S): 80 TABLET ORAL at 05:58

## 2024-05-02 RX ADMIN — SODIUM CHLORIDE 2 GRAM(S): 9 INJECTION INTRAMUSCULAR; INTRAVENOUS; SUBCUTANEOUS at 17:02

## 2024-05-02 RX ADMIN — Medication 75 MILLIGRAM(S): at 21:21

## 2024-05-02 RX ADMIN — Medication 10 MILLIGRAM(S): at 17:02

## 2024-05-02 NOTE — PROGRESS NOTE ADULT - SUBJECTIVE AND OBJECTIVE BOX
HPI:  84 y/o female with PMHx HTN and A Fib (started taking  Eliquisx1 week ago, last dose 4/28 AM)   BIBA from home due to headache, dx with IVH      NIH Stroke Scale:   · Intubated	No   · Sedated/Unresponsive	No   · NIH Stroke Scale: LOC	(0) Alert; keenly responsive   · NIH Stroke Scale: LOC Question	(0) Answers both questions correctly   · NIH Stroke Scale: LOC Command	(0) Performs both tasks correctly   · NIH Stroke Scale: Gaze	(0) Normal   · NIH Stroke Scale: Visual	(0) No visual loss   · NIH Stroke Scale: Facial	(0) Normal symmetrical movements   · NIH Stroke Scale: Arm Left	(0) No drift; limb holds 90 (or 45) degrees for full 10 secs   · NIH Stroke Scale: Arm Right	(0) No drift; limb holds 90 (or 45) degrees for full 10 secs   · NIH Stroke Scale: Leg Left	(0) No drift; leg holds 30 degree position for full 5 secs   · NIH Stroke Scale: Leg Right	(0) No drift; leg holds 30 degree position for full 5 secs   · NIH Stroke Scale: Ataxia	(0) Absent   · NIH Stroke Scale: Sensory	(0) Normal; no sensory loss   · NIH Stroke Scale: Language	(0) No aphasia; normal   · NIH Stroke Scale: Dysarthria	(0) Normal   · NIH Stroke Scale: Extinct Inattention	(0) No abnormality   · NIH Stroke Scale: Total	0      (28 Apr 2024 22:41)      INTERVAL HPI/OVERNIGHT EVENTS:  83y Female seen lying comfortably in bed. Reports HA. Denies numbness, weakness, n/v/d, fevers, chills, chest pain, SOB.     Vital Signs Last 24 Hrs  T(C): 37.3 (02 May 2024 08:05), Max: 37.3 (02 May 2024 08:05)  T(F): 99.1 (02 May 2024 08:05), Max: 99.1 (02 May 2024 08:05)  HR: 110 (02 May 2024 08:00) (75 - 112)  BP: 161/78 (02 May 2024 08:00) (134/90 - 173/103)  BP(mean): 98 (02 May 2024 08:00) (95 - 125)  RR: 14 (02 May 2024 08:00) (14 - 20)  SpO2: 96% (02 May 2024 08:00) (96% - 100%)    Parameters below as of 02 May 2024 08:00  Patient On (Oxygen Delivery Method): room air        PHYSICAL EXAM:  GENERAL: NAD, well-groomed, well-developed  HEAD:  Atraumatic, normocephalic  DRAINS:   WOUND: Dressing clean dry intact  HERMANN COMA SCORE: E- V- M- =       E: 4= opens eyes spontaneously 3= to voice 2= to noxious 1= no opening       V: 5= oriented 4= confused 3= inappropriate words 2= incomprehensible sounds 1= nonverbal 1T= intubated       M: 6= follows commands 5= localizes 4= withdraws 3= flexor posturing 2= extensor posturing 1= no movement  MENTAL STATUS: AAO x3; Awake/Comatose; Opens eyes spontaneously/to voice/to light touch/to noxious stimuli; Appropriately conversant without aphasia/Nonverbal; following simple commands/mimicking/not following commands  CRANIAL NERVES: Visual acuity normal for age, visual fields full to confrontation, PERRL. EOMI without nystagmus. Facial sensation intact V1-3 distribution b/l. Face symmetric w/ normal eye closure and smile, tongue midline. Hearing grossly intact. Speech clear. Head turning and shoulder shrug intact.   REFLEXES: PERRL. Corneals intact b/l. Gag intact. Cough intact. Oculocephalic reflex intact (Doll's eye). Negative Rivas's b/l. Negative clonus b/l  MOTOR: strength 5/5 b/l upper and lower extremities  Uppers     Delt (C5/6)     Bicep (C5/6)     Wrist Extend (C6)     Tricep (C7)     HG (C8/T1)  R                     5/5                 5/5                         5/5                           5/5                   5/5  L                      5/5                 5/5                         5/5                           5/5                   5/5  Lowers      HF(L1/L2)     KE (L3)     DF (L4)     EHL (L5)     PF (S1)      R                     5/5              5/5           5/5           5/5            5/5  L                     5/5               5/5          5/5            5/5            5/5  SENSATION: grossly intact to light touch all extremities  COORDINATION: Gait intact; rapid alternating movements intact; heel to shin intact; no upper extremity dysmetria  CHEST/LUNG: Clear to auscultation bilaterally; no rales, rhonchi, wheezing, or rubs  HEART: +S1/+S2; Regular rate and rhythm; no murmurs, rubs, or gallops  ABDOMEN: Soft, nontender, nondistended; bowel sounds present all four quadrants  EXTREMITIES:  2+ peripheral pulses, no clubbing, cyanosis, or edema  SKIN: Warm, dry; no rashes or lesions    LABS:                        12.9   16.15 )-----------( 356      ( 02 May 2024 07:20 )             36.9     05-02    127<L>  |  94<L>  |  15.8  ----------------------------<  118<H>  3.5   |  23.0  |  0.38<L>    Ca    8.5      02 May 2024 07:20  Phos  2.6     05-02  Mg     1.9     05-02    TPro  6.0<L>  /  Alb  3.5  /  TBili  1.3  /  DBili  x   /  AST  16  /  ALT  25  /  AlkPhos  68  05-02      Urinalysis Basic - ( 02 May 2024 07:20 )    Color: x / Appearance: x / SG: x / pH: x  Gluc: 118 mg/dL / Ketone: x  / Bili: x / Urobili: x   Blood: x / Protein: x / Nitrite: x   Leuk Esterase: x / RBC: x / WBC x   Sq Epi: x / Non Sq Epi: x / Bacteria: x        05-01 @ 07:01  -  05-02 @ 07:00  --------------------------------------------------------  IN: 110 mL / OUT: 0 mL / NET: 110 mL        RADIOLOGY & ADDITIONAL TESTS:          CAPRINI SCORE [CLOT]:  Patient has an estimated Caprini score of greater than 5.  However, the patient's unique clinical situation will be addressed in an individual manner to determine appropriate anticoagulation treatment, if any. HPI:  82 y/o female with PMHx HTN and A Fib (started taking  Eliquisx1 week ago, last dose 4/28 AM)   BIBA from home due to headache, dx with IVH      NIH Stroke Scale:   · Intubated	No   · Sedated/Unresponsive	No   · NIH Stroke Scale: LOC	(0) Alert; keenly responsive   · NIH Stroke Scale: LOC Question	(0) Answers both questions correctly   · NIH Stroke Scale: LOC Command	(0) Performs both tasks correctly   · NIH Stroke Scale: Gaze	(0) Normal   · NIH Stroke Scale: Visual	(0) No visual loss   · NIH Stroke Scale: Facial	(0) Normal symmetrical movements   · NIH Stroke Scale: Arm Left	(0) No drift; limb holds 90 (or 45) degrees for full 10 secs   · NIH Stroke Scale: Arm Right	(0) No drift; limb holds 90 (or 45) degrees for full 10 secs   · NIH Stroke Scale: Leg Left	(0) No drift; leg holds 30 degree position for full 5 secs   · NIH Stroke Scale: Leg Right	(0) No drift; leg holds 30 degree position for full 5 secs   · NIH Stroke Scale: Ataxia	(0) Absent   · NIH Stroke Scale: Sensory	(0) Normal; no sensory loss   · NIH Stroke Scale: Language	(0) No aphasia; normal   · NIH Stroke Scale: Dysarthria	(0) Normal   · NIH Stroke Scale: Extinct Inattention	(0) No abnormality   · NIH Stroke Scale: Total	0      (28 Apr 2024 22:41)      INTERVAL HPI/OVERNIGHT EVENTS:  83y Female seen lying comfortably in bed. Reports HA. Denies numbness, weakness, n/v/d, fevers, chills, chest pain, SOB.     Vital Signs Last 24 Hrs  T(C): 37.3 (02 May 2024 08:05), Max: 37.3 (02 May 2024 08:05)  T(F): 99.1 (02 May 2024 08:05), Max: 99.1 (02 May 2024 08:05)  HR: 110 (02 May 2024 08:00) (75 - 112)  BP: 161/78 (02 May 2024 08:00) (134/90 - 173/103)  BP(mean): 98 (02 May 2024 08:00) (95 - 125)  RR: 14 (02 May 2024 08:00) (14 - 20)  SpO2: 96% (02 May 2024 08:00) (96% - 100%)    Parameters below as of 02 May 2024 08:00  Patient On (Oxygen Delivery Method): room air        PHYSICAL EXAM:  GENERAL: NAD  HEAD:  Atraumatic, normocephalic  HERMANN COMA SCORE: E-4 V-5 M-6 =15  MENTAL STATUS: AAO x3; Appropriately conversant   CRANIAL NERVES: PERRL. EOMI without nystagmus. Facial sensation intact V1-3 distribution b/l. Face symmetric w/ normal eye closure and smile, tongue midline.   MOTOR: strength 5/5 b/l upper and lower extremities  SENSATION: Grossly intact to light touch all extremities  CHEST/LUNG: Non-labored breathing  SKIN: Warm, dry    LABS:                        12.9   16.15 )-----------( 356      ( 02 May 2024 07:20 )             36.9     05-02    127<L>  |  94<L>  |  15.8  ----------------------------<  118<H>  3.5   |  23.0  |  0.38<L>    Ca    8.5      02 May 2024 07:20  Phos  2.6     05-02  Mg     1.9     05-02    TPro  6.0<L>  /  Alb  3.5  /  TBili  1.3  /  DBili  x   /  AST  16  /  ALT  25  /  AlkPhos  68  05-02      Urinalysis Basic - ( 02 May 2024 07:20 )    Color: x / Appearance: x / SG: x / pH: x  Gluc: 118 mg/dL / Ketone: x  / Bili: x / Urobili: x   Blood: x / Protein: x / Nitrite: x   Leuk Esterase: x / RBC: x / WBC x   Sq Epi: x / Non Sq Epi: x / Bacteria: x        05-01 @ 07:01  -  05-02 @ 07:00  --------------------------------------------------------  IN: 110 mL / OUT: 0 mL / NET: 110 mL        RADIOLOGY & ADDITIONAL TESTS:    < from: CT Head No Cont (05.01.24 @ 08:30) >  Multiple axial sections were performed from the base skull to vertex   without contrast enhancement. Coronal and significant structures performed    This exam compared prior head CT performed on April 29, 2024.    Parenchymal volume loss and chronic microvessel ischemic changes again   seen.    Intraventricular hemorrhage is again seen involving the lateral third and   fourth ventricles. Dilated ventricles are also again identified.    Evaluation of the osseous with the appropriate window appears unremarkable    The visualized paranasal sinuses mastoid and middle ear regions appear   clear.    IMPRESSION: Stable exam.    < end of copied text >   HPI:  82 y/o female with PMHx HTN and A Fib (started taking  Eliquisx1 week ago, last dose 4/28 AM)   BIBA from home due to headache, dx with IVH      NIH Stroke Scale:   · Intubated	No   · Sedated/Unresponsive	No   · NIH Stroke Scale: LOC	(0) Alert; keenly responsive   · NIH Stroke Scale: LOC Question	(0) Answers both questions correctly   · NIH Stroke Scale: LOC Command	(0) Performs both tasks correctly   · NIH Stroke Scale: Gaze	(0) Normal   · NIH Stroke Scale: Visual	(0) No visual loss   · NIH Stroke Scale: Facial	(0) Normal symmetrical movements   · NIH Stroke Scale: Arm Left	(0) No drift; limb holds 90 (or 45) degrees for full 10 secs   · NIH Stroke Scale: Arm Right	(0) No drift; limb holds 90 (or 45) degrees for full 10 secs   · NIH Stroke Scale: Leg Left	(0) No drift; leg holds 30 degree position for full 5 secs   · NIH Stroke Scale: Leg Right	(0) No drift; leg holds 30 degree position for full 5 secs   · NIH Stroke Scale: Ataxia	(0) Absent   · NIH Stroke Scale: Sensory	(0) Normal; no sensory loss   · NIH Stroke Scale: Language	(0) No aphasia; normal   · NIH Stroke Scale: Dysarthria	(0) Normal   · NIH Stroke Scale: Extinct Inattention	(0) No abnormality   · NIH Stroke Scale: Total	0      (28 Apr 2024 22:41)      INTERVAL HPI/OVERNIGHT EVENTS:  83y Female seen lying comfortably in bed. Reports HA. Denies numbness, weakness, n/v/d, fevers, chills, chest pain, SOB.     Vital Signs Last 24 Hrs  T(C): 37.3 (02 May 2024 08:05), Max: 37.3 (02 May 2024 08:05)  T(F): 99.1 (02 May 2024 08:05), Max: 99.1 (02 May 2024 08:05)  HR: 110 (02 May 2024 08:00) (75 - 112)  BP: 161/78 (02 May 2024 08:00) (134/90 - 173/103)  BP(mean): 98 (02 May 2024 08:00) (95 - 125)  RR: 14 (02 May 2024 08:00) (14 - 20)  SpO2: 96% (02 May 2024 08:00) (96% - 100%)    Parameters below as of 02 May 2024 08:00  Patient On (Oxygen Delivery Method): room air        PHYSICAL EXAM:  GENERAL: NAD  HEAD:  Atraumatic, normocephalic  HERMANN COMA SCORE: E-4 V-5 M-6 =15  MENTAL STATUS: AAO x3; Appropriately conversant   CRANIAL NERVES: PERRL. EOMI without nystagmus. Face symmetric w/ normal eye closure and smile, tongue midline.   MOTOR: strength 5/5 b/l upper and lower extremities  SENSATION: Grossly intact to light touch all extremities  CHEST/LUNG: Non-labored breathing  SKIN: Warm, dry    LABS:                        12.9   16.15 )-----------( 356      ( 02 May 2024 07:20 )             36.9     05-02    127<L>  |  94<L>  |  15.8  ----------------------------<  118<H>  3.5   |  23.0  |  0.38<L>    Ca    8.5      02 May 2024 07:20  Phos  2.6     05-02  Mg     1.9     05-02    TPro  6.0<L>  /  Alb  3.5  /  TBili  1.3  /  DBili  x   /  AST  16  /  ALT  25  /  AlkPhos  68  05-02      Urinalysis Basic - ( 02 May 2024 07:20 )    Color: x / Appearance: x / SG: x / pH: x  Gluc: 118 mg/dL / Ketone: x  / Bili: x / Urobili: x   Blood: x / Protein: x / Nitrite: x   Leuk Esterase: x / RBC: x / WBC x   Sq Epi: x / Non Sq Epi: x / Bacteria: x        05-01 @ 07:01  -  05-02 @ 07:00  --------------------------------------------------------  IN: 110 mL / OUT: 0 mL / NET: 110 mL        RADIOLOGY & ADDITIONAL TESTS:    < from: CT Head No Cont (05.01.24 @ 08:30) >  Multiple axial sections were performed from the base skull to vertex   without contrast enhancement. Coronal and significant structures performed    This exam compared prior head CT performed on April 29, 2024.    Parenchymal volume loss and chronic microvessel ischemic changes again   seen.    Intraventricular hemorrhage is again seen involving the lateral third and   fourth ventricles. Dilated ventricles are also again identified.    Evaluation of the osseous with the appropriate window appears unremarkable    The visualized paranasal sinuses mastoid and middle ear regions appear   clear.    IMPRESSION: Stable exam.    < end of copied text >

## 2024-05-02 NOTE — PROGRESS NOTE ADULT - ASSESSMENT
84 y/o female with PMHx HTN and A Fib (started taking Eliquisx1 week ago, last dose 4/28 AM) with Large IVH with extension into 3rd ventricle. Afebrile. VSS. H&H 12.9/36.9, stable. WBC 16.15. Na 127.   Pt stable at this time. CTH from 5/1 stable.     Plan:  MRI pending   Maintain normonatremia   Q2 Neuro checks    CTH 5/1 stable   Repeat CTH if neurological decline   Regular diet   Monitor H&H

## 2024-05-02 NOTE — PROGRESS NOTE ADULT - PROBLEM SELECTOR PLAN 2
.  - Goal SBP under 150, range -150  - persistently 160 SBP  - remains in Afib change coreg to metoprolol as above  - continue valsartan 80mg PO daily.  - add hydralazine 25mg PO TID .  - Goal SBP under 150, range -150  - persistently 160 SBP  - remains in Afib change coreg to metoprolol as above  - change valsartan to 80mg PO TID hold for SBP <110, change to 180mg PO daily if BP well controlled

## 2024-05-02 NOTE — PROGRESS NOTE ADULT - ASSESSMENT
82 y/o female with PMHx HTN, Breast Ca (dx 2020, surgery, no chemo/radiation) and A Fib (started taking  Eliquisx1 week ago, last dose 4/28 AM) BIBA to [facility]  from home due to headache. Patient had recently been admitted due to HTN after running out of her coreg. CT head found with intraventricular bleed and was transferred to [facility] ED for further evaluation. She was given Andexxa for Eliquis reversal and BP was controlled with nicardipine for SBP > 200s. She recently lost her  and has not been compliant with medications. She is now weaned off nicardipine. She denies chest pain, back pain, SOB, LOVELACE, diaphoresis, syncope or N/V. Of note patient endorses that she had fallen and hit her head a couple of days before initiation of Eliquis but did not communicate to team at that time.

## 2024-05-02 NOTE — PROGRESS NOTE ADULT - SUBJECTIVE AND OBJECTIVE BOX
Preliminary note, official recommendations pending attending review/signature   Seen and examined by Stroke team attending/team, assessment/ plan as discussed with stroke team attending/team as noted.     [facility] Stroke Team  Progress Note     HPI:  82 y/o female with PMHx HTN and A Fib (reportedly only started taking Eliquis x 1 week prior to admission, last dose 4/28 AM), hx of Breast CA? on Anastrazole, who presented to [place] ED complaining of headache on 4/28/24. Patient is a poor historian so hx obtained from chart. Per notes from [place], EMS reported SBP > 200 upon arrival to ED. Upon arrival to [place], CTH was performed and showed an intraventricular hemorrhage. Patient was reversed w/ Andexxa and started on cardene gtt, transferred to [facility] for further workup and care. Patient reportedly lost her  in January and had been taking care of him, and has been having difficulty taking care of herself since his passing. ICH 3 on admission. Patient admitted to Neuro ICU for further monitoring and care.     SUBJECTIVE: Overnight pt received one push hydralazine 10mg IV @ 1am for /103mmHg.  No new neurologic complaints.  ROS reported negative unless otherwise noted.    anastrozole 1 milliGRAM(s) Oral daily  heparin   Injectable 5000 Unit(s) SubCutaneous every 8 hours  hydrALAZINE 25 milliGRAM(s) Oral three times a day  hydrALAZINE Injectable 10 milliGRAM(s) IV Push every 2 hours PRN  influenza  Vaccine (HIGH DOSE) 0.7 milliLiter(s) IntraMuscular once  labetalol Injectable 10 milliGRAM(s) IV Push every 2 hours PRN  latanoprost 0.005% Ophthalmic Solution 1 Drop(s) Both EYES at bedtime  levothyroxine 75 MICROGram(s) Oral daily  metoprolol tartrate 75 milliGRAM(s) Oral every 8 hours  senna 2 Tablet(s) Oral at bedtime PRN  sodium chloride 1 Gram(s) Oral daily  valsartan 80 milliGRAM(s) Oral daily      PHYSICAL EXAM:   Vital Signs Last 24 Hrs  T(C): 37.3 (02 May 2024 08:05), Max: 37.3 (02 May 2024 08:05)  T(F): 99.1 (02 May 2024 08:05), Max: 99.1 (02 May 2024 08:05)  HR: 110 (02 May 2024 08:00) (75 - 119)  BP: 161/78 (02 May 2024 08:00) (134/90 - 173/103)  BP(mean): 98 (02 May 2024 08:00) (95 - 125)  RR: 14 (02 May 2024 08:00) (14 - 20)  SpO2: 96% (02 May 2024 08:00) (96% - 100%)    Parameters below as of 02 May 2024 08:00  Patient On (Oxygen Delivery Method): room air      INCOMPLETE EXAM   PHYSICAL EXAM:  General: No acute distress.   HEENT: Head normocephalic, atraumatic. Conjunctivae clear w/o exudates or hemorrhage. Sclera non-icteric. Nares are patent bilaterally. Mucous membranes pink and moist.  No tonsillar swelling or exudates.    Neck: Supple, no adenopathy. Trachea midline. No JVD.  Cardiac: Normal rate and irregular rhythm. S1, S2 auscultated. No murmurs, gallops, or rubs.     Respiratory: Lung sounds clear in all fields. Chest wall symmetric, nontender.   Abdominal: Soft, nondistended, nontender. Bowel sounds normoactive x 4 quadrants. No masses, hepatomegaly, or splenomegaly.   Skin: Skin is warm, dry and intact without rashes or lesions. Appropriate color for ethnicity. Nailbeds pink with no cyanosis or clubbing.   Extremities: No edema, 2+ peripheral pulses in b/l upper and b/l lower extremities. Full range of motion in all joints.     NEUROLOGICAL EXAM:  Mental status: Awake, alert, oriented x 2-3, oriented to self, place, and month, disoriented to year, states "2084". States her correct age. Speech fluent, follows commands, no neglect, normal memory   Cranial Nerves: No facial asymmetry, no nystagmus, no dysarthria,  tongue midline  Motor exam: Normal tone, no drift, 5/5 RUE, 5/5 RLE, 5/5 LUE, 5/5 LLE, normal fine finger movements.  Sensation: Intact to light touch   Coordination: no dysmetria  Gait: deferred      LABS:                        12.9   16.15 )-----------( 356      ( 02 May 2024 07:20 )             36.9    05-02    127<L>  |  94<L>  |  15.8  ----------------------------<  118<H>  3.5   |  23.0  |  0.38<L>    Ca    8.5      02 May 2024 07:20  Phos  2.6     05-02  Mg     1.9     05-02    TPro  6.0<L>  /  Alb  3.5  /  TBili  1.3  /  DBili  x   /  AST  16  /  ALT  25  /  AlkPhos  68  05-02 04-29 Chol 151 LDL -60- HDL 79 Trig 62, 03-23 Chol 145 LDL -- HDL 71 Trig 41    A1C: 5.3      IMAGING: Reviewed by me.    NEUROIMAGING    CT Head No Cont (05.01.24 @ 08:30)   IMPRESSION: Stable exam.    CT Head No Cont (04.29.24 @ 20:33)   IMPRESSION: Stable acute small left caudate head intraparenchymal   hemorrhage measuring 6 mm. Moderate intraventricular hemorrhage   throughout the left lateral ventricle, third ventricle and fourth   ventricle. Mild intraventricular hemorrhage in the right lateral   ventricle. Mild to moderate hydrocephalus, unchanged..    CT Head No Cont (04.28.24 @ 22:53)  IMPRESSION:  No change inintraventricular hemorrhage and dilatation of the lateral   and third ventricles compared with prior exam. Ill-defined hemorrhage   again seen in the left caudate body and left caudate head.    (04.28.24 @ 20:02)  IMPRESSION:  CTA NECK:  No significant stenosis of the cervical carotid arteries based   on NASCET criteria. Patent cervical vertebral arteries.    CTA HEAD: No high-grade stenosis or occlusion of the major proximal   arterial branches. No evidence of an intracranial arterial aneurysm or   arteriovenous malformation on CTA. No evidence of active bleeding within   the left caudate and intraventricular hemorrhage.    CT Brain Stroke Protocol (04.28.24 @ 20:02)  IMPRESSION:  Intraventricular hemorrhage, as described above. Ill-defined parenchymal   hemorrhage within the left caudate body and caudate head. Mild dilatation   of the lateral and third ventricles.      ULTRASOUND    US Duplex Venous Lower Ext Complete, Bilateral (04.29.24 @ 08:33)  IMPRESSION:  No evidence of deep venous thrombosis in either lower extremity.  Right Baker's cyst.   Preliminary note, official recommendations pending attending review/signature   Seen and examined by Stroke team attending/team, assessment/ plan as discussed with stroke team attending/team as noted.     [facility] Stroke Team  Progress Note     HPI:  84 y/o female with PMHx HTN and A Fib (reportedly only started taking Eliquis x 1 week prior to admission, last dose 4/28 AM), hx of Breast CA? on Anastrazole, who presented to [place] ED complaining of headache on 4/28/24. Patient is a poor historian so hx obtained from chart. Per notes from [place], EMS reported SBP > 200 upon arrival to ED. Upon arrival to [place], CTH was performed and showed an intraventricular hemorrhage. Patient was reversed w/ Andexxa and started on cardene gtt, transferred to [facility] for further workup and care. Patient reportedly lost her  in January and had been taking care of him, and has been having difficulty taking care of herself since his passing. ICH 3 on admission. Patient admitted to Neuro ICU for further monitoring and care.     SUBJECTIVE: Patient seen and examined at bedside. Patient sitting up in chair, eating meal, appears in NAD. Overnight pt received one push hydralazine 10mg IV @ 1am for /103mmHg.  No new neurologic complaints.  ROS reported negative unless otherwise noted.    MEDICATIONS:  anastrozole 1 milliGRAM(s) Oral daily  heparin   Injectable 5000 Unit(s) SubCutaneous every 8 hours  hydrALAZINE 25 milliGRAM(s) Oral three times a day  hydrALAZINE Injectable 10 milliGRAM(s) IV Push every 2 hours PRN  influenza  Vaccine (HIGH DOSE) 0.7 milliLiter(s) IntraMuscular once  labetalol Injectable 10 milliGRAM(s) IV Push every 2 hours PRN  latanoprost 0.005% Ophthalmic Solution 1 Drop(s) Both EYES at bedtime  levothyroxine 75 MICROGram(s) Oral daily  metoprolol tartrate 75 milliGRAM(s) Oral every 8 hours  senna 2 Tablet(s) Oral at bedtime PRN  sodium chloride 1 Gram(s) Oral daily  valsartan 80 milliGRAM(s) Oral daily      Vital Signs Last 24 Hrs  T(C): 37.3 (02 May 2024 08:05), Max: 37.3 (02 May 2024 08:05)  T(F): 99.1 (02 May 2024 08:05), Max: 99.1 (02 May 2024 08:05)  HR: 110 (02 May 2024 08:00) (75 - 119)  BP: 161/78 (02 May 2024 08:00) (134/90 - 173/103)  BP(mean): 98 (02 May 2024 08:00) (95 - 125)  RR: 14 (02 May 2024 08:00) (14 - 20)  SpO2: 96% (02 May 2024 08:00) (96% - 100%)    Parameters below as of 02 May 2024 08:00  Patient On (Oxygen Delivery Method): room air      PHYSICAL EXAM:  General: No acute distress.   HEENT: Head normocephalic, atraumatic. Conjunctivae clear w/o exudates or hemorrhage. Sclera non-icteric. Nares are patent bilaterally. Mucous membranes pink and moist.  No tonsillar swelling or exudates.    Neck: Supple, no adenopathy. Trachea midline. No JVD.  Cardiac: Normal rate and irregular rhythm. S1, S2 auscultated. No murmurs, gallops, or rubs.     Respiratory: Lung sounds clear in all fields. Chest wall symmetric, nontender.   Abdominal: Soft, nondistended, nontender. Bowel sounds normoactive x 4 quadrants. No masses, hepatomegaly, or splenomegaly.   Skin: Skin is warm, dry and intact without rashes or lesions. Appropriate color for ethnicity. Nailbeds pink with no cyanosis or clubbing.   Extremities: No edema, 2+ peripheral pulses in b/l upper and b/l lower extremities. Full range of motion in all joints.     NEUROLOGICAL EXAM:  Mental status: Awake, alert, oriented x 2-3, oriented to self, place, and year disoriented to month, states it is July. States her correct age. Speech fluent, follows commands, no neglect, short-term memory appears to be impaired, patient does not recall why she is in the hospital, states "it's because I have problems"    Cranial Nerves: No facial asymmetry, no nystagmus, no dysarthria,  tongue midline  Motor exam: Normal tone, no drift, 5/5 RUE, 5/5 RLE, 5/5 LUE, 5/5 LLE, normal fine finger movements.  Sensation: Intact to light touch   Coordination: no dysmetria  Gait: deferred      LABS:                        12.9   16.15 )-----------( 356      ( 02 May 2024 07:20 )             36.9    05-02    127<L>  |  94<L>  |  15.8  ----------------------------<  118<H>  3.5   |  23.0  |  0.38<L>    Ca    8.5      02 May 2024 07:20  Phos  2.6     05-02  Mg     1.9     05-02    TPro  6.0<L>  /  Alb  3.5  /  TBili  1.3  /  DBili  x   /  AST  16  /  ALT  25  /  AlkPhos  68  05-02 04-29 Chol 151 LDL -60- HDL 79 Trig 62, 03-23 Chol 145 LDL -- HDL 71 Trig 41    A1C: 5.3      IMAGING:    NEUROIMAGING    CT Head No Cont (05.01.24 @ 08:30)   IMPRESSION: Stable exam.    CT Head No Cont (04.29.24 @ 20:33)   IMPRESSION: Stable acute small left caudate head intraparenchymal   hemorrhage measuring 6 mm. Moderate intraventricular hemorrhage   throughout the left lateral ventricle, third ventricle and fourth   ventricle. Mild intraventricular hemorrhage in the right lateral   ventricle. Mild to moderate hydrocephalus, unchanged..    CT Head No Cont (04.28.24 @ 22:53)  IMPRESSION:  No change inintraventricular hemorrhage and dilatation of the lateral   and third ventricles compared with prior exam. Ill-defined hemorrhage   again seen in the left caudate body and left caudate head.    (04.28.24 @ 20:02)  IMPRESSION:  CTA NECK:  No significant stenosis of the cervical carotid arteries based   on NASCET criteria. Patent cervical vertebral arteries.    CTA HEAD: No high-grade stenosis or occlusion of the major proximal   arterial branches. No evidence of an intracranial arterial aneurysm or   arteriovenous malformation on CTA. No evidence of active bleeding within   the left caudate and intraventricular hemorrhage.    CT Brain Stroke Protocol (04.28.24 @ 20:02)  IMPRESSION:  Intraventricular hemorrhage, as described above. Ill-defined parenchymal   hemorrhage within the left caudate body and caudate head. Mild dilatation   of the lateral and third ventricles.      ULTRASOUND    US Duplex Venous Lower Ext Complete, Bilateral (04.29.24 @ 08:33)  IMPRESSION:  No evidence of deep venous thrombosis in either lower extremity.  Right Baker's cyst.   Preliminary note, official recommendations pending attending review/signature   Seen and examined by Stroke team attending/team, assessment/ plan as discussed with stroke team attending/team as noted.     [facility] Stroke Team  Progress Note     HPI:  82 y/o female with PMHx HTN and A Fib (reportedly only started taking Eliquis x 1 week prior to admission, last dose 4/28 AM), hx of Breast CA? on Anastrazole, who presented to [place] ED complaining of headache on 4/28/24. Patient is a poor historian so hx obtained from chart. Per notes from [place], EMS reported SBP > 200 upon arrival to ED. Upon arrival to [place], CTH was performed and showed an intraventricular hemorrhage. Patient was reversed w/ Andexxa and started on cardene gtt, transferred to [facility] for further workup and care. Patient reportedly lost her  in January and had been taking care of him, and has been having difficulty taking care of herself since his passing. ICH 3 on admission. Patient admitted to Neuro ICU for further monitoring and care.     SUBJECTIVE: Patient seen and examined at bedside. Patient sitting up in chair, eating meal, appears in NAD. Overnight pt received one push hydralazine 10mg IV @ 1am for /103mmHg.  No new neurologic complaints.  ROS reported negative unless otherwise noted.    MEDICATIONS:  anastrozole 1 milliGRAM(s) Oral daily  heparin   Injectable 5000 Unit(s) SubCutaneous every 8 hours  hydrALAZINE 25 milliGRAM(s) Oral three times a day  hydrALAZINE Injectable 10 milliGRAM(s) IV Push every 2 hours PRN  influenza  Vaccine (HIGH DOSE) 0.7 milliLiter(s) IntraMuscular once  labetalol Injectable 10 milliGRAM(s) IV Push every 2 hours PRN  latanoprost 0.005% Ophthalmic Solution 1 Drop(s) Both EYES at bedtime  levothyroxine 75 MICROGram(s) Oral daily  metoprolol tartrate 75 milliGRAM(s) Oral every 8 hours  senna 2 Tablet(s) Oral at bedtime PRN  sodium chloride 1 Gram(s) Oral daily  valsartan 80 milliGRAM(s) Oral daily      Vital Signs Last 24 Hrs  T(C): 37.3 (02 May 2024 08:05), Max: 37.3 (02 May 2024 08:05)  T(F): 99.1 (02 May 2024 08:05), Max: 99.1 (02 May 2024 08:05)  HR: 110 (02 May 2024 08:00) (75 - 119)  BP: 161/78 (02 May 2024 08:00) (134/90 - 173/103)  BP(mean): 98 (02 May 2024 08:00) (95 - 125)  RR: 14 (02 May 2024 08:00) (14 - 20)  SpO2: 96% (02 May 2024 08:00) (96% - 100%)    Parameters below as of 02 May 2024 08:00  Patient On (Oxygen Delivery Method): room air      PHYSICAL EXAM:  General: No acute distress.   HEENT: Head normocephalic, atraumatic. Conjunctivae clear w/o exudates or hemorrhage. Sclera non-icteric. Nares are patent bilaterally. Mucous membranes pink and moist.  No tonsillar swelling or exudates.    Neck: Supple, no adenopathy. Trachea midline. No JVD.  Cardiac: Normal rate and irregular rhythm. S1, S2 auscultated. No murmurs, gallops, or rubs.     Respiratory: Lung sounds clear in all fields. Chest wall symmetric, nontender.   Abdominal: Soft, nondistended, nontender. Bowel sounds normoactive x 4 quadrants. No masses, hepatomegaly, or splenomegaly.   Skin: Skin is warm, dry and intact without rashes or lesions. Appropriate color for ethnicity. Nailbeds pink with no cyanosis or clubbing.   Extremities: No edema, 2+ peripheral pulses in b/l upper and b/l lower extremities. Full range of motion in all joints.     NEUROLOGICAL EXAM:  Mental status: Awake, alert, oriented x 2-3, oriented to self, place, and year disoriented to month, states it is July. States her correct age. Speech fluent, follows commands, no neglect, short-term memory appears to be impaired, patient does not recall why she is in the hospital, states "it's because I have problems"    Cranial Nerves: No facial asymmetry, no nystagmus, no dysarthria,  tongue midline  Motor exam: Normal tone, no drift, 5/5 RUE, 5/5 RLE, 5/5 LUE, 5/5 LLE, normal fine finger movements.  Sensation: Intact to light touch   Coordination: no dysmetria  Gait: deferred      LABS:                        12.9   16.15 )-----------( 356      ( 02 May 2024 07:20 )             36.9    05-02    127<L>  |  94<L>  |  15.8  ----------------------------<  118<H>  3.5   |  23.0  |  0.38<L>    Ca    8.5      02 May 2024 07:20  Phos  2.6     05-02  Mg     1.9     05-02    TPro  6.0<L>  /  Alb  3.5  /  TBili  1.3  /  DBili  x   /  AST  16  /  ALT  25  /  AlkPhos  68  05-02 04-29 Chol 151 LDL -60- HDL 79 Trig 62, 03-23 Chol 145 LDL -- HDL 71 Trig 41    A1C: 5.3      IMAGING:    NEUROIMAGING    CT Head No Cont (05.01.24 @ 08:30)   IMPRESSION: Stable exam.    CT Head No Cont (04.29.24 @ 20:33)   IMPRESSION: Stable acute small left caudate head intraparenchymal   hemorrhage measuring 6 mm. Moderate intraventricular hemorrhage   throughout the left lateral ventricle, third ventricle and fourth   ventricle. Mild intraventricular hemorrhage in the right lateral   ventricle. Mild to moderate hydrocephalus, unchanged..    CT Head No Cont (04.28.24 @ 22:53)  IMPRESSION:  No change inintraventricular hemorrhage and dilatation of the lateral   and third ventricles compared with prior exam. Ill-defined hemorrhage   again seen in the left caudate body and left caudate head.    (04.28.24 @ 20:02)  IMPRESSION:  CTA NECK:  No significant stenosis of the cervical carotid arteries based   on NASCET criteria. Patent cervical vertebral arteries.    CTA HEAD: No high-grade stenosis or occlusion of the major proximal   arterial branches. No evidence of an intracranial arterial aneurysm or   arteriovenous malformation on CTA. No evidence of active bleeding within   the left caudate and intraventricular hemorrhage.    CT Brain Stroke Protocol (04.28.24 @ 20:02)  IMPRESSION:  Intraventricular hemorrhage, as described above. Ill-defined parenchymal   hemorrhage within the left caudate body and caudate head. Mild dilatation   of the lateral and third ventricles.      ULTRASOUND    US Duplex Venous Lower Ext Complete, Bilateral (04.29.24 @ 08:33)  IMPRESSION:  No evidence of deep venous thrombosis in either lower extremity.  Right Baker's cyst.       [facility] Stroke Team  Progress Note     HPI:  82 y/o female with PMHx HTN and A Fib (reportedly only started taking Eliquis x 1 week prior to admission, last dose 4/28 AM), hx of Breast CA? on Anastrazole, who presented to [place] ED complaining of headache on 4/28/24. Patient is a poor historian so hx obtained from chart. Per notes from [place], EMS reported SBP > 200 upon arrival to ED. Upon arrival to [place], CTH was performed and showed an intraventricular hemorrhage. Patient was reversed w/ Andexxa and started on cardene gtt, transferred to [facility] for further workup and care. Patient reportedly lost her  in January and had been taking care of him, and has been having difficulty taking care of herself since his passing. ICH 3 on admission. Patient admitted to Neuro ICU for further monitoring and care.     SUBJECTIVE: Patient seen and examined at bedside. Patient sitting up in chair, eating meal, appears in NAD. Overnight pt received one push hydralazine 10mg IV @ 1am for /103mmHg.  No new neurologic complaints.  ROS reported negative unless otherwise noted.    MEDICATIONS:  anastrozole 1 milliGRAM(s) Oral daily  heparin   Injectable 5000 Unit(s) SubCutaneous every 8 hours  hydrALAZINE 25 milliGRAM(s) Oral three times a day  hydrALAZINE Injectable 10 milliGRAM(s) IV Push every 2 hours PRN  influenza  Vaccine (HIGH DOSE) 0.7 milliLiter(s) IntraMuscular once  labetalol Injectable 10 milliGRAM(s) IV Push every 2 hours PRN  latanoprost 0.005% Ophthalmic Solution 1 Drop(s) Both EYES at bedtime  levothyroxine 75 MICROGram(s) Oral daily  metoprolol tartrate 75 milliGRAM(s) Oral every 8 hours  senna 2 Tablet(s) Oral at bedtime PRN  sodium chloride 1 Gram(s) Oral daily  valsartan 80 milliGRAM(s) Oral daily      Vital Signs Last 24 Hrs  T(C): 37.3 (02 May 2024 08:05), Max: 37.3 (02 May 2024 08:05)  T(F): 99.1 (02 May 2024 08:05), Max: 99.1 (02 May 2024 08:05)  HR: 110 (02 May 2024 08:00) (75 - 119)  BP: 161/78 (02 May 2024 08:00) (134/90 - 173/103)  BP(mean): 98 (02 May 2024 08:00) (95 - 125)  RR: 14 (02 May 2024 08:00) (14 - 20)  SpO2: 96% (02 May 2024 08:00) (96% - 100%)    Parameters below as of 02 May 2024 08:00  Patient On (Oxygen Delivery Method): room air      PHYSICAL EXAM:  General: No acute distress.   HEENT: Head normocephalic, atraumatic. Conjunctivae clear w/o exudates or hemorrhage. Sclera non-icteric. Nares are patent bilaterally. Mucous membranes pink and moist.  No tonsillar swelling or exudates.    Neck: Supple, no adenopathy. Trachea midline. No JVD.  Cardiac: Normal rate and irregular rhythm. S1, S2 auscultated. No murmurs, gallops, or rubs.     Respiratory: Lung sounds clear in all fields. Chest wall symmetric, nontender.   Abdominal: Soft, nondistended, nontender. Bowel sounds normoactive x 4 quadrants. No masses, hepatomegaly, or splenomegaly.   Skin: Skin is warm, dry and intact without rashes or lesions. Appropriate color for ethnicity. Nailbeds pink with no cyanosis or clubbing.   Extremities: No edema, 2+ peripheral pulses in b/l upper and b/l lower extremities. Full range of motion in all joints.     NEUROLOGICAL EXAM:  Mental status: Awake, alert, oriented x 2-3, oriented to self, place, and year disoriented to month, states it is July. States her correct age. Speech fluent, follows commands, no neglect, short-term memory appears to be impaired, patient does not recall why she is in the hospital, states "it's because I have problems"    Cranial Nerves: No facial asymmetry, no nystagmus, no dysarthria,  tongue midline  Motor exam: Normal tone, no drift, 5/5 RUE, 5/5 RLE, 5/5 LUE, 5/5 LLE, normal fine finger movements.  Sensation: Intact to light touch   Coordination: no dysmetria  Gait: deferred      LABS:                        12.9   16.15 )-----------( 356      ( 02 May 2024 07:20 )             36.9    05-02    127<L>  |  94<L>  |  15.8  ----------------------------<  118<H>  3.5   |  23.0  |  0.38<L>    Ca    8.5      02 May 2024 07:20  Phos  2.6     05-02  Mg     1.9     05-02    TPro  6.0<L>  /  Alb  3.5  /  TBili  1.3  /  DBili  x   /  AST  16  /  ALT  25  /  AlkPhos  68  05-02 04-29 Chol 151 LDL -60- HDL 79 Trig 62, 03-23 Chol 145 LDL -- HDL 71 Trig 41    A1C: 5.3      IMAGING:    NEUROIMAGING    CT Head No Cont (05.01.24 @ 08:30)   IMPRESSION: Stable exam.    CT Head No Cont (04.29.24 @ 20:33)   IMPRESSION: Stable acute small left caudate head intraparenchymal   hemorrhage measuring 6 mm. Moderate intraventricular hemorrhage   throughout the left lateral ventricle, third ventricle and fourth   ventricle. Mild intraventricular hemorrhage in the right lateral   ventricle. Mild to moderate hydrocephalus, unchanged..    CT Head No Cont (04.28.24 @ 22:53)  IMPRESSION:  No change inintraventricular hemorrhage and dilatation of the lateral   and third ventricles compared with prior exam. Ill-defined hemorrhage   again seen in the left caudate body and left caudate head.    (04.28.24 @ 20:02)  IMPRESSION:  CTA NECK:  No significant stenosis of the cervical carotid arteries based   on NASCET criteria. Patent cervical vertebral arteries.    CTA HEAD: No high-grade stenosis or occlusion of the major proximal   arterial branches. No evidence of an intracranial arterial aneurysm or   arteriovenous malformation on CTA. No evidence of active bleeding within   the left caudate and intraventricular hemorrhage.    CT Brain Stroke Protocol (04.28.24 @ 20:02)  IMPRESSION:  Intraventricular hemorrhage, as described above. Ill-defined parenchymal   hemorrhage within the left caudate body and caudate head. Mild dilatation   of the lateral and third ventricles.      ULTRASOUND    US Duplex Venous Lower Ext Complete, Bilateral (04.29.24 @ 08:33)  IMPRESSION:  No evidence of deep venous thrombosis in either lower extremity.  Right Baker's cyst.

## 2024-05-02 NOTE — CHART NOTE - NSCHARTNOTEFT_GEN_A_CORE
PA NOTE-MEDICINE    Called by RN due to Pt "Sleepy".  RN doesn't Have Baseline of Alertness.  Also asked to Folow Up on Serial BMPs due to Pt with ongoing Hyponatremia.     Pt woken up by me,  A & O x 3 Answers questions Follows Commands  Neuro Exam-Baseline   FS-112  VSS   Cardiac: S1S2 +   Lungs: CTA B/L     RN inquired about Pts sleepiness with Day RN who states she has been sleepy all Day.  Repeat BMP   LABS:               125<L>  |  90<L>  |  21.6<H>  ----------------------------<  115<H>  3.6   |  22.0  |  0.38<L>    Previous Sodium from 14:19 was 126.   Pt's Sodium has been trending down daily from 4/30/24 Sodium: 139 PA NOTE-MEDICINE    Called by RN due to Pt "Sleepy".  RN doesn't Have Baseline of Alertness.  Also asked to folow up on Serial BMPs due to Pt with ongoing Hyponatremia. No Record of Pain Meds or Benzos Given today.     Pt woken up by me,  A & O x 3 Answers questions Follows Commands but without constant physical stimulation falls asleep.   Neuro Exam-Baseline   FS-112  VSS-Afebrile   UA ordered this Evening-Neg   Cardiac: S1S2 + Irreg/Irreg   Lungs: CTA B/L   Abd NDNT No Guarding, Rigidity + BS x 4   Ext: No C/C/E x 4  CRANDALL   Integument: No Pallor Warm/Dry     Neuro: A @ O x 3 responds and follows commands appropriately with constant physical stimulation   Perrl: Sl Pinpoint equal + Reactive-OU     Cranial Nerves: No facial asymmetry, no nystagmus, no dysarthria,  tongue midline  Motor/Strenght:  5/5 x 4 Ext  No Drift x 4 Ext   Sensation: Intact to light touch Throughout    A/P Eval Pt due to Pts sleepiness-unable to keep awake unless physically Stimulated   Repeat BMP   LABS:  Resulted at 19:20 this evenin<L>  |  90<L>  |  21.6<H>  ----------------------------<  115<H>  3.6   |  22.0  |  0.38<L>    Previous Sodium from This Afternoon @ 14:19 was 126.   Pt's Sodium has been trending down daily from 24 Sodium was: 139.  Spoke to Dr Quintana-Neurologist on call   Instructed to order 2% Hypertonic Sodium Chloride @ 30 cc's/Hr Stat  (Ordered)   Repeat BMPs Q 6 hrs (Next BMP due at 3) AM-Will Follow   CT Brain-Urgent (Ordered)    Continue to Monitor Pt  Continue Q 2 Hr Neuro Checks/Vitals   Recall PA for any changes in Pt Status/Neuro Exam   Will Follow BMP/CTH   Will sign out to AM Team to Follow PA NOTE-MEDICINE    Called by RN due to Pt "Sleepy".  RN doesn't Have Baseline of Alertness.  Also asked to folow up on Serial BMPs due to Pt with ongoing Hyponatremia. No Record of Pain Meds or Benzos Given today.     Pt woken up by me,  A & O x 3 Answers questions Follows Commands but without constant physical stimulation falls asleep.   Neuro Exam-Baseline   FS-112  VSS-Afebrile   UA ordered this Evening-Neg   Cardiac: S1S2 + Irreg/Irreg   Lungs: CTA B/L   Abd NDNT No Guarding, Rigidity + BS x 4   Ext: No C/C/E x 4  CRANDALL   Integument: No Pallor Warm/Dry     Neuro: A @ O x 3 responds and follows commands appropriately with constant physical stimulation   Perrl: Sl Pinpoint equal + Reactive-OU     Cranial Nerves: No facial asymmetry, no nystagmus, no dysarthria,  tongue midline  Motor/Strenght:  5/5 x 4 Ext  No Drift x 4 Ext   Sensation: Intact to light touch Throughout    A/P Eval Pt due to Pts sleepiness-unable to keep awake unless physically Stimulated   Repeat BMP   LABS:  Resulted at 19:20 this evenin<L>  |  90<L>  |  21.6<H>  ----------------------------<  115<H>  3.6   |  22.0  |  0.38<L>    Previous Sodium from This Afternoon @ 14:19 was 126.   Pt's Sodium has been trending down daily from 24 Sodium was: 139.  Spoke to Dr Quintana-Neurologist on call   Instructed to order 2% Hypertonic Sodium Chloride @ 30 cc's/Hr Stat  (Ordered)   Repeat BMPs Q 6 hrs (Next BMP due at 3) AM-Will Follow   CT Brain-Urgent (Ordered)    Continue to Monitor Pt  Continue Q 2 Hr Neuro Checks/Vitals   Recall PA for any changes in Pt Status/Neuro Exam   Will Follow BMP/CTH   AM BMP still Pending reordered as Stat  CTH- No official read @ 4:33  Will sign out to AM Team to Follow PA NOTE-MEDICINE    Called by RN due to Pt "Sleepy".  RN doesn't Have Baseline of Alertness.  Also asked to folow up on Serial BMPs due to Pt with ongoing Hyponatremia. No Record of Pain Meds or Benzos Given today.     Pt woken up by me,  A & O x 3 Answers questions Follows Commands but without constant physical stimulation falls asleep.   Neuro Exam-Baseline   FS-112  VSS-Afebrile   UA ordered this Evening-Neg   Cardiac: S1S2 + Irreg/Irreg   Lungs: CTA B/L   Abd NDNT No Guarding, Rigidity + BS x 4   Ext: No C/C/E x 4  CRANDALL   Integument: No Pallor Warm/Dry     Neuro: A @ O x 3 responds and follows commands appropriately with constant physical stimulation   Perrl: Sl Pinpoint equal + Reactive-OU     Cranial Nerves: No facial asymmetry, no nystagmus, no dysarthria,  tongue midline  Motor/Strenght:  5/5 x 4 Ext  No Drift x 4 Ext   Sensation: Intact to light touch Throughout    A/P Eval Pt due to Pts sleepiness-unable to keep awake unless physically Stimulated   Repeat BMP   LABS:  Resulted at 19:20 this evenin<L>  |  90<L>  |  21.6<H>  ----------------------------<  115<H>  3.6   |  22.0  |  0.38<L>    Previous Sodium from This Afternoon @ 14:19 was 126.   Pt's Sodium has been trending down daily from 24 Sodium was: 139.  Spoke to Dr Quintana-Neurologist on call   Instructed to order 2% Hypertonic Sodium Chloride @ 30 cc's/Hr Stat  (Ordered)   Repeat BMPs Q 6 hrs (Next BMP due at 3) AM-Will Follow   CT Brain-Urgent (Ordered)    Continue to Monitor Pt  Continue Q 2 Hr Neuro Checks/Vitals   Recall PA for any changes in Pt Status/Neuro Exam   Will Follow BMP/CTH   ADDENDUM:  BMP Resulted  LABS:                        13.2   14.01 )-----------( 342      ( 03 May 2024 04:41 )             37.8     05-03    132<L>  |  103  |  18.8  ----------------------------<  85  2.8<LL>   |  17.0<L>  |  <0.20<L>    Ca    5.8<LL>      03 May 2024 04:33  Phos  1.9     05-03  Mg     1.3     05-03    Sodium Level increased by 7 points within 8 hrs: Stopped Hypertonic 2% Sodium Chloride Stat  Repeat BMP ordered for 4 Hrs @ 8 AM   Repleted Potassium with Klor Con and K-Riders x 3   Repleted Ca with Ca Gluconate with 1 Gm IVPB Stat  Repleted Mag with 1 Gm IVPB for Mg level of 2     Awaiting CTH Read @ 5:31   Continue to Monitor Pt  Recall PA for any changes in Pt Status   Will sign out to AM Team to follow Up                     CTH- No official read @ 4:33  Will sign out to AM Team to Follow PA NOTE-MEDICINE    Called by RN due to Pt "Sleepy".  RN doesn't Have Baseline of Alertness.  Also asked to folow up on Serial BMPs due to Pt with ongoing Hyponatremia. No Record of Pain Meds or Benzos Given today.     Pt woken up by me,  A & O x 3 Answers questions Follows Commands but without constant physical stimulation falls asleep.   Neuro Exam-Baseline   FS-112  VSS-Afebrile   UA ordered this Evening-Neg   Cardiac: S1S2 + Irreg/Irreg   Lungs: CTA B/L   Abd NDNT No Guarding, Rigidity + BS x 4   Ext: No C/C/E x 4  CRANDALL   Integument: No Pallor Warm/Dry     Neuro: A @ O x 3 responds and follows commands appropriately with constant physical stimulation   Perrl: Sl Pinpoint equal + Reactive-OU     Cranial Nerves: No facial asymmetry, no nystagmus, no dysarthria,  tongue midline  Motor/Strenght:  5/5 x 4 Ext  No Drift x 4 Ext   Sensation: Intact to light touch Throughout    A/P Eval Pt due to Pts sleepiness-unable to keep awake unless physically Stimulated   Repeat BMP   LABS:  Resulted at 19:20 this evenin<L>  |  90<L>  |  21.6<H>  ----------------------------<  115<H>  3.6   |  22.0  |  0.38<L>    Previous Sodium from This Afternoon @ 14:19 was 126.   Pt's Sodium has been trending down daily from 24 Sodium was: 139.  Spoke to Dr Quintana-Neurologist on call   Instructed to order 2% Hypertonic Sodium Chloride @ 30 cc's/Hr Stat  (Ordered)   Repeat BMPs Q 6 hrs (Next BMP due at 3) AM-Will Follow   CT Brain-Urgent (Ordered)    Continue to Monitor Pt  Continue Q 2 Hr Neuro Checks/Vitals   Recall PA for any changes in Pt Status/Neuro Exam   Will Follow BMP/CTH   ADDENDUM:  BMP Resulted  LABS:                        13.2   14.01 )-----------( 342      ( 03 May 2024 04:41 )             37.8     05-03    132<L>  |  103  |  18.8  ----------------------------<  85  2.8<LL>   |  17.0<L>  |  <0.20<L>    Ca    5.8<LL>      03 May 2024 04:33  Phos  1.9     05-03  Mg     1.3     05-03    Sodium Level increased by 7 points within 8 hrs: Stopped Hypertonic 2% Sodium Chloride Stat  Reassessed Pt  Now Wide Awake and Alert No Changes in Baseline Neuro Exam   Repeat BMP ordered for 4 Hrs @ 8 AM   Repleted Potassium with Klor Con and K-Riders x 3   Repleted Ca with Ca Gluconate with 1 Gm IVPB Stat  Repleted Mag with 1 Gm IVPB for Mg level of 2     Awaiting CTH Read @ 5:31   Continue to Monitor Pt  Recall PA for any changes in Pt Status   Will sign out to AM Team to follow Up     CTH- No official read @ 4:33  Will sign out to AM Team to Follow

## 2024-05-02 NOTE — PROGRESS NOTE ADULT - PROBLEM SELECTOR PLAN 1
.  - [id number]  - now s/p intraventricular bleed on Eliquis, endorses fall prior to initiation of Eliquis but did not communicate to team  - Currently Afib with better controlled rates   - continue Metoprolol 75mg PO q8H with hold parameters, can increase to q6H if need better HR control  - TTE with EF 55-60%, normal RV mild to mod TR, severe pHTN  - Will need OP monitor to assess AF burden  - will also need OP evaluation for LAAO when current medical problems resolve.

## 2024-05-02 NOTE — PROGRESS NOTE ADULT - SUBJECTIVE AND OBJECTIVE BOX
[facility]                                                         CARDIOLOGY AT [facility]                                                                  [address] [address]                                                         Telephone: [phone]. Fax:[phone]                                                                             PROGRESS NOTE    Reason for follow up: HTN, Afib  Update: remains hypertensive. added hydralazine 25mg PO TID for further BP control. More rate controlled  records requested from OP office    Review of symptoms:   Cardiac:  No chest pain. No dyspnea. No palpitations.  Respiratory: no cough. No dyspnea  Gastrointestinal: No diarrhea. No abdominal pain. No bleeding.   Neuro: No focal neuro complaints.    Vitals:  T(C): 37.3 (05-02-24 @ 08:05), Max: 37.3 (05-02-24 @ 08:05)  HR: 110 (05-02-24 @ 08:00) (75 - 119)  BP: 161/78 (05-02-24 @ 08:00) (134/90 - 173/103)  RR: 14 (05-02-24 @ 08:00) (14 - 20)  SpO2: 96% (05-02-24 @ 08:00) (96% - 100%)  Wt(kg): --  I&O's Summary    01 May 2024 07:01  -  02 May 2024 07:00  --------------------------------------------------------  IN: 110 mL / OUT: 0 mL / NET: 110 mL      Weight (kg): 61.6 (04-29 @ 00:36)    PHYSICAL EXAM:  Appearance: Comfortable. No acute distress  HEENT:  Atraumatic. Normocephalic.  Normal oral mucosa  Neurologic: A & O x 3, no gross focal deficits.  Cardiovascular: irreg irregular S1 S2, No murmur, no rubs/gallops. No JVD  Respiratory: Lungs clear to auscultation, unlabored   Gastrointestinal:  Soft, Non-tender, + BS  Lower Extremities: 2+ Peripheral Pulses, No clubbing, cyanosis, or edema  Psychiatry: Patient is calm. No agitation.   Skin: warm and dry.    CURRENT CARDIAC MEDICATIONS:  hydrALAZINE 25 milliGRAM(s) Oral every 8 hours  hydrALAZINE Injectable 10 milliGRAM(s) IV Push every 2 hours PRN  labetalol Injectable 10 milliGRAM(s) IV Push every 2 hours PRN  metoprolol tartrate 75 milliGRAM(s) Oral every 8 hours  valsartan 80 milliGRAM(s) Oral daily      CURRENT OTHER MEDICATIONS:  senna 2 Tablet(s) Oral at bedtime PRN Constipation  levothyroxine 75 MICROGram(s) Oral daily  anastrozole 1 milliGRAM(s) Oral daily  heparin   Injectable 5000 Unit(s) SubCutaneous every 8 hours  influenza  Vaccine (HIGH DOSE) 0.7 milliLiter(s) IntraMuscular once  latanoprost 0.005% Ophthalmic Solution 1 Drop(s) Both EYES at bedtime  sodium chloride 1 Gram(s) Oral daily      LABS:	 	                            12.9   16.15 )-----------( 356      ( 02 May 2024 07:20 )             36.9     05-02    127<L>  |  94<L>  |  15.8  ----------------------------<  118<H>  3.5   |  23.0  |  0.38<L>    Ca    8.5      02 May 2024 07:20  Phos  2.6     05-02  Mg     1.9     05-02    TPro  6.0<L>  /  Alb  3.5  /  TBili  1.3  /  DBili  x   /  AST  16  /  ALT  25  /  AlkPhos  68  05-02    PT/INR/PTT ( 29 Apr 2024 06:00 )                       :                       :      13.8         :       27.9                  .        .                   .              .           .       1.25        .                                       Lipid Profile: Date: 04-29 @ 06:00  Total cholesterol 151; Direct LDL: --; HDL: 79; Triglycerides:62    HgA1c:   TSH: Thyroid Stimulating Hormone, Serum: 1.24 uIU/mL      TELEMETRY: Afib mostly rate controlled, rate to 130s   ECG:    DIAGNOSTIC TESTING:  [ ] Echocardiogram:   < from: TTE W or WO Ultrasound Enhancing Agent (04.30.24 @ 14:03) >    TRANSTHORACIC ECHOCARDIOGRAM REPORT  ________________________________________________________________________________                                      _______       Pt. Name:       JUMA DOMINGUEZ Study Date:    4/30/2024  MRN:            [id number]    YOB: 1940  Accession #:    [id number]    Age:           83 years  Account#:       [id number]   Gender:        F  Visit ID#  Heart Rate:                  Height:        64.96 in (165.00 cm)  Rhythm:                      Weight:132.28 lb (60.00 kg)  Blood Pressure: 132/93 mmHg  BSA/BMI:       1.66 m² / 22.04 kg/m²  ________________________________________________________________________________________  Referring Physician:    Surendra Smart  Interpreting Physician: Bryan Millan MD  Primary Sonographer:    Immanuel Pate    CPT:               ECHO TTE W/O CON F/U [facility] - 18969.m;LIMITED SPECTRAL -                     46821.m;DOPPL ECHO COLOR FLOW - 22903.m  Indication(s):     Abnormal electrocardiogram ECG/EKG - R94.31  Procedure:         Limited transthoracic echocardiogram.  Ordering Location: [facility]  Admission Status:  Inpatient  UEA Reaction:      Patient had a rash after injection of Ultrasound Enhancing                     Agent.  Study Information: Image quality for this study is fair.    _______________________________________________________________________________________     CONCLUSIONS:      1. Limited study f/u for LV function.   2. Left ventricular cavity is normal in size. Left ventricular systolic function is normal with an ejection fraction visually estimated at 55 to 60 %.   3. Normal right ventricular cavity size and normal systolic function.   4. Mild to moderate tricuspid regurgitation.   5. No pericardial effusion seen.   6. Estimated pulmonary artery systolic pressure is 60 mmHg, consistent with severe pulmonary hypertension.    ________________________________________________________________________________________    < end of copied text >  [ ]  Catheterization:  [ ] Stress Test:    OTHER:

## 2024-05-02 NOTE — PROGRESS NOTE ADULT - ASSESSMENT
INCOMPLETE NOTE    ASSESSMENT:  84 y/o female with PMHx HTN and A Fib (reportedly only started taking Eliquis x 1 week prior to admission, last dose 4/28 AM), hx of Breast CA? on Anastrazole, who presented to [facility] ED complaining of headache on 4/28/24. Patient is a poor historian so hx obtained from chart. Per notes from [facility], EMS reported SBP > 200 upon arrival to ED. Upon arrival to [facility], CTH was performed and showed an intraventricular hemorrhage. Patient was reversed w/ Andexxa and started on cardene gtt, transferred to [facility] for further workup and care.    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 Patient has a hx of noncompliance to meds.    NEURO:  #Acute intraventricular hemorrhage  -Neurologically patient with ongoing improvement  -Continue close monitoring for neurological deterioration  -Stroke neuro checks q 2 hours  -Pending MR Head w/wo IV Cont  -Permissive HTN, SBP Goal 130-150 mmHg, maintain BP under 150/90 mmHg  -ANTITHROMBOTIC THERAPY: Not indicated at this time given IVH, hold all AP/AC, eventually will need to be restarted on at minimum antiplatelets, but would hold of at least 14 days.  Anticoagulation only to be restarted if BP remains stable and if she can demonstrate compliance. Per cardio note: CHADSVasc 4, but currently with acute cerebral bleeding and with recent fall injury/trauma not ideal candidate for long term AC use, consider elective outpatient LAAO     -STATIN THERAPY: Not indicated at this time given IVH, LDL 60  -LDL 60   -STAT head CT for any neurologic change, especially given patient has hydrocephalus  -DYSPHAGIA: PASS   -Will need serial imaging to monitor hydrocephalus  -Head of bed at 30 degrees   -PT/OT/Speech eval and treatment     CARDIAC:  #Atrial fibrillation #Hypertensive emergency #HTN  -VS q 2 hours  -TTE as noted: EF 55-60%  -Cardiology c/s for recommendations in management   -Continue cardiac monitoring pending findings of further workup    PULM:  -Patient tolerating room air, saturating well  -Continue to maintain spO2 > 94%    HEME:  -H/H 11.7/34.6   -Platelets 305  -Continue to monitor, CBC q daily  -US Duplex Bilateral Lower Extremity Venous negative for evidence of DVT  -DVT ppx: Heparin subcutaneous q 8h    GI:  -Abdominal exam benign, patient with no complaints  -Diet: Regular  -Bowel regimen: Senna PO at bedtime PRN constipation    RENAL/METABOLIC:  #Hypophosphatemia #Hyponatremia   -BUN/Cr 21.0/0.44, GFR 95  -Patient voiding well w/o difficulty, continue I&O's  -Phosphorus 1.8, repleted  -Persistent hyponatremia, Sodium 133, patient started on sodium chloride 1g q daily  -Magnesium 1.8, 2g IVPB ordered, goal Mag > 2.0  -All other electrolytes WNL, replete PRN    ENDOCRINE:  -A1C 5.3  -TSH 1.24    ID:  -Afebrile, no leukocytosis, no indication of infection/sepsis  -Continue to monitor for signs/symptoms of infection    OTHER: Condition discussed w/ patient and family at bedside.    DISPO: Pending clinical course      CORE MEASURES:        Admission NIHSS: 0     Tenecteplase : [] YES [x] NO      LDL/A1C: 60/5.3     Depression Screen- if depression hx and/or present      Statin Therapy: Not indicated at this time, IVH     Dysphagia Screen: [x] PASS [] FAIL     Smoking [] YES [x] NO      Afib [x] YES [] NO     Stroke Education [x] YES [] NO   ASSESSMENT:  84 y/o female with PMHx HTN and A Fib (reportedly only started taking Eliquis x 1 week prior to admission, last dose 4/28 AM), hx of Breast CA? on Anastrazole, who presented to [facility] ED complaining of headache on 4/28/24. Patient is a poor historian so hx obtained from chart. Per notes from [facility], EMS reported SBP > 200 upon arrival to ED. Upon arrival to [facility], CTH was performed and showed an intraventricular hemorrhage. Patient was reversed w/ Andexxa and started on cardene gtt, transferred to [facility] for further workup and care.    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 Patient has a hx of noncompliance to meds.    5/2: Na continuing to downtrend despite 1g sodium chloride tablets PO ordered daily starting 5/1, increased sodium tablets to TID, 1000mL fluid restriction ordered.    NEURO:  #Acute intraventricular hemorrhage  -Neurologically patient with ongoing improvement  -Continue close monitoring for neurological deterioration  -Stroke neuro checks q 2 hours  -Pending MR Head w/wo IV Cont  -Permissive HTN, SBP Goal 130-150 mmHg, maintain BP under 150/90 mmHg  -ANTITHROMBOTIC THERAPY: Not indicated at this time given IVH, hold all AP/AC, eventually will need to be restarted on at minimum antiplatelets, but would hold of at least 14 days.  Anticoagulation only to be restarted if BP remains stable and if she can demonstrate compliance. Per cardio note: CHADSVasc 4, but currently with acute cerebral bleeding and with recent fall injury/trauma not ideal candidate for long term AC use, consider elective outpatient LAAO   -CTH 5/1 demonstrating stable exam  -STATIN THERAPY: Not indicated at this time given IVH, LDL 60  -LDL 60   -STAT head CT for any neurologic change, especially given patient has hydrocephalus  -DYSPHAGIA: PASS   -Will need serial imaging to monitor hydrocephalus  -Head of bed at 30 degrees   -PT/OT/Speech eval and treatment   -Patient refusing rehab per case management, states she wants to go home --> Pending OT cognitive eval     CARDIAC:  #Atrial fibrillation #Hypertensive emergency #HTN  -VS q 2 hours  -TTE as noted: EF 55-60%  -Cardiology c/s for recommendations in management   -Valsartan increased to 80mg TID per cardio today given ongoing HTN   -Continue cardiac monitoring pending findings of further workup    PULM:  -Patient tolerating room air, saturating well  -Continue to maintain spO2 > 94%    HEME:  -H/H 12.9/36.9   -Platelets 356  -Continue to monitor, CBC q daily  -US Duplex Bilateral Lower Extremity Venous negative for evidence of DVT  -DVT ppx: Heparin subcutaneous q 8h    GI:  -Abdominal exam benign, patient with no complaints  -Diet: Regular, 1000mL fluid restriction  -Bowel regimen: Senna PO at bedtime PRN constipation    RENAL/METABOLIC:  #Hypophosphatemia, resolved #Hyponatremia   -BUN/Cr 21.0/0.44, GFR 95  -Patient voiding well w/o difficulty, continue I&O's  -Persistent hyponatremia, Na 127   -Na continuing to downtrend despite 1g sodium chloride tablets PO ordered daily starting 5/1 --> increased sodium tablets to TID, 1000mL fluid restriction ordered  -Pending urine sodium   -Pending urine osmolality   -BMP q 6 hours  -All other electrolytes WNL, replete PRN    ENDOCRINE:  -A1C 5.3  -TSH 1.24    ID:  -Leukocytosis to 16k WBC  -Pending UA+micro, urine cx   -Afebrile, no indication of infection/sepsis  -Continue to monitor for signs/symptoms of infection    OTHER: Condition discussed w/ patient and family at bedside.    DISPO: Pending clinical course      CORE MEASURES:        Admission NIHSS: 0     Tenecteplase : [] YES [x] NO      LDL/A1C: 60/5.3     Depression Screen- if depression hx and/or present      Statin Therapy: Not indicated at this time, IV     Dysphagia Screen: [x] PASS [] FAIL     Smoking [] YES [x] NO      Afib [x] YES [] NO     Stroke Education [x] YES [] NO   ASSESSMENT:  84 y/o female with PMHx HTN and A Fib (reportedly only started taking Eliquis x 1 week prior to admission, last dose 4/28 AM), hx of Breast CA? on Anastrazole, who presented to [place] ED complaining of headache on 4/28/24. Patient is a poor historian so hx obtained from chart. Per notes from [facility], EMS reported SBP > 200 upon arrival to ED. Upon arrival to [facility], CTH was performed and showed an intraventricular hemorrhage. Patient was reversed w/ Andexxa and started on cardene gtt, transferred to [facility] for further workup and care.    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 Patient has a hx of noncompliance to meds.    5/2: Na continuing to downtrend despite 1g sodium chloride tablets PO ordered daily starting 5/1, increased sodium tablets to TID, 1000mL fluid restriction ordered.    NEURO:  #Acute intraventricular hemorrhage  -Neurologically patient with ongoing improvement  -Continue close monitoring for neurological deterioration  -Stroke neuro checks q 2 hours  -Pending MR Head w/wo IV Cont  -Permissive HTN, SBP Goal 130-150 mmHg, maintain BP under 150/90 mmHg  -ANTITHROMBOTIC THERAPY: Not indicated at this time given IVH, hold all AP/AC, eventually will need to be restarted on at minimum antiplatelets, but would hold of at least 14 days.  Anticoagulation only to be restarted if BP remains stable and if she can demonstrate compliance. Per cardio note: CHADSVasc 4, but currently with acute cerebral bleeding and with recent fall injury/trauma not ideal candidate for long term AC use, consider elective outpatient LAAO   -CTH 5/1 demonstrating stable exam  -STATIN THERAPY: Not indicated at this time given IVH, LDL 60  -LDL 60   -STAT head CT for any neurologic change, especially given patient has hydrocephalus  -DYSPHAGIA: PASS   -Will need serial imaging to monitor hydrocephalus  -Head of bed at 30 degrees   -PT/OT/Speech eval and treatment   -Patient refusing rehab per case management, states she wants to go home --> Pending OT cognitive eval     CARDIAC:  #Atrial fibrillation #Hypertensive emergency #HTN  -VS q 2 hours  -TTE as noted: EF 55-60%  -Cardiology c/s for recommendations in management   -Valsartan increased to 80mg TID per cardio today given ongoing HTN   -Continue cardiac monitoring pending findings of further workup    PULM:  -Patient tolerating room air, saturating well  -Continue to maintain spO2 > 94%    HEME:  -H/H 12.9/36.9   -Platelets 356  -Continue to monitor, CBC q daily  -US Duplex Bilateral Lower Extremity Venous negative for evidence of DVT  -DVT ppx: Heparin subcutaneous q 8h    GI:  -Abdominal exam benign, patient with no complaints  -Diet: Regular, 1000mL fluid restriction  -Bowel regimen: Senna PO at bedtime PRN constipation    RENAL/METABOLIC:  #Hypophosphatemia, resolved #Hyponatremia   -BUN/Cr 21.0/0.44, GFR 95  -Patient voiding well w/o difficulty, continue I&O's  -Persistent hyponatremia, Na 127   -Na continuing to downtrend despite 1g sodium chloride tablets PO ordered daily starting 5/1 --> increased sodium tablets to TID, 1000mL fluid restriction ordered  -Pending urine sodium   -Pending urine osmolality   -BMP q 6 hours  -All other electrolytes WNL, replete PRN    ENDOCRINE:  -A1C 5.3  -TSH 1.24    ID:  -Leukocytosis, WBC 16.15  -Pending UA+micro, urine cx   -Afebrile, no indication of infection/sepsis  -Continue to monitor for signs/symptoms of infection    OTHER:   -Condition discussed w/ patient and family at bedside.  -Patient's brother is primary surrogate for patient, Umberto Butler [phone]    DISPO: Pending clinical course      CORE MEASURES:        Admission NIHSS: 0     Tenecteplase : [] YES [x] NO      LDL/A1C: 60/5.3     Depression Screen- if depression hx and/or present      Statin Therapy: Not indicated at this time, IV     Dysphagia Screen: [x] PASS [] FAIL     Smoking [] YES [x] NO      Afib [x] YES [] NO     Stroke Education [x] YES [] NO   ASSESSMENT:  82 y/o female with PMHx HTN and A Fib (reportedly only started taking Eliquis x 1 week prior to admission, last dose 4/28 AM), hx of Breast CA? on Anastrazole, who presented to [facility] ED complaining of headache on 4/28/24. Patient is a poor historian so hx obtained from chart. Per notes from [facility], EMS reported SBP > 200 upon arrival to ED. Upon arrival to [facility], CTH was performed and showed an intraventricular hemorrhage. Patient was reversed w/ Andexxa and started on cardene gtt, transferred to [facility] for further workup and care.    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 Patient has a hx of noncompliance to meds.    5/2: Na continuing to downtrend despite 1g sodium chloride tablets PO ordered daily starting 5/1, increased sodium tablets to TID, 1000mL fluid restriction ordered.    NEURO:  #Acute intraventricular hemorrhage  -Neurologically patient with ongoing improvement  -Continue close monitoring for neurological deterioration  -Stroke neuro checks q 2 hours  -Pending MR Head w/wo IV Cont  -Permissive HTN, SBP Goal 130-150 mmHg, maintain BP under 150/90 mmHg  -ANTITHROMBOTIC THERAPY: Not indicated at this time given IVH, hold all AP/AC, eventually will need to be restarted on at minimum antiplatelets, but would hold of at least 14 days.  Anticoagulation only to be restarted if BP remains stable and if she can demonstrate compliance. Per cardio note: CHADSVasc 4, but currently with acute cerebral bleeding and with recent fall injury/trauma not ideal candidate for long term AC use, consider elective outpatient LAAO   -CTH 5/1 demonstrating stable exam  -STATIN THERAPY: Not indicated at this time given IVH, LDL 60  -LDL 60   -STAT head CT for any neurologic change, especially given patient has hydrocephalus  -DYSPHAGIA: PASS   -Will need serial imaging to monitor hydrocephalus  -Head of bed at 30 degrees   -PT/OT/Speech eval and treatment   -Patient refusing rehab per case management, states she wants to go home --> Pending OT cognitive eval     CARDIAC:  #Atrial fibrillation #Hypertensive emergency #HTN  -VS q 2 hours  -TTE as noted: EF 55-60%  -Cardiology c/s for recommendations in management   -Valsartan increased to 80mg TID per cardio today given ongoing HTN   -Continue cardiac monitoring pending findings of further workup    PULM:  -Patient tolerating room air, saturating well  -Continue to maintain spO2 > 94%    HEME:  -H/H 12.9/36.9   -Platelets 356  -Continue to monitor, CBC q daily  -US Duplex Bilateral Lower Extremity Venous negative for evidence of DVT  -DVT ppx: Heparin subcutaneous q 8h    GI:  -Abdominal exam benign, patient with no complaints  -Diet: Regular, 1000mL fluid restriction  -Bowel regimen: Senna PO at bedtime PRN constipation    RENAL/METABOLIC:  #Hypophosphatemia, resolved #Hyponatremia   -BUN/Cr 21.0/0.44, GFR 95  -Patient voiding well w/o difficulty, continue I&O's  -Persistent hyponatremia, Na 127   -Na continuing to downtrend despite 1g sodium chloride tablets PO ordered daily starting 5/1 --> increased sodium tablets to 2g BID, 1000mL fluid restriction ordered  -Pending urine sodium   -Pending urine osmolality   -BMP q 6 hours  - Nephro c/s  -All other electrolytes WNL, replete PRN    ENDOCRINE:  -A1C 5.3  -TSH 1.24    ID:  -Leukocytosis, WBC 16.15  -Pending UA+micro, urine cx   -Afebrile, no indication of infection/sepsis  -Continue to monitor for signs/symptoms of infection    OTHER:   -Condition discussed w/ patient and family at bedside.  -Patient's brother is primary surrogate for patient, Umberto Butler [phone]    DISPO: Pending clinical course      CORE MEASURES:        Admission NIHSS: 0     Tenecteplase : [] YES [x] NO      LDL/A1C: 60/5.3     Depression Screen- if depression hx and/or present      Statin Therapy: Not indicated at this time, IV     Dysphagia Screen: [x] PASS [] FAIL     Smoking [] YES [x] NO      Afib [x] YES [] NO     Stroke Education [x] YES [] NO

## 2024-05-03 LAB
ANION GAP SERPL CALC-SCNC: 12 MMOL/L — SIGNIFICANT CHANGE UP (ref 5–17)
ANION GAP SERPL CALC-SCNC: 12 MMOL/L — SIGNIFICANT CHANGE UP (ref 5–17)
ANION GAP SERPL CALC-SCNC: 13 MMOL/L — SIGNIFICANT CHANGE UP (ref 5–17)
ANION GAP SERPL CALC-SCNC: 13 MMOL/L — SIGNIFICANT CHANGE UP (ref 5–17)
APPEARANCE UR: CLEAR — SIGNIFICANT CHANGE UP
BACTERIA # UR AUTO: ABNORMAL /HPF
BILIRUB UR-MCNC: NEGATIVE — SIGNIFICANT CHANGE UP
BUN SERPL-MCNC: 18.8 MG/DL — SIGNIFICANT CHANGE UP (ref 8–20)
BUN SERPL-MCNC: 22.8 MG/DL — HIGH (ref 8–20)
BUN SERPL-MCNC: 24.6 MG/DL — HIGH (ref 8–20)
BUN SERPL-MCNC: 40.9 MG/DL — HIGH (ref 8–20)
CALCIUM SERPL-MCNC: 5.8 MG/DL — CRITICAL LOW (ref 8.4–10.5)
CALCIUM SERPL-MCNC: 8.4 MG/DL — SIGNIFICANT CHANGE UP (ref 8.4–10.5)
CALCIUM SERPL-MCNC: 8.5 MG/DL — SIGNIFICANT CHANGE UP (ref 8.4–10.5)
CALCIUM SERPL-MCNC: 8.5 MG/DL — SIGNIFICANT CHANGE UP (ref 8.4–10.5)
CAST: 1 /LPF — SIGNIFICANT CHANGE UP (ref 0–4)
CHLORIDE SERPL-SCNC: 103 MMOL/L — SIGNIFICANT CHANGE UP (ref 96–108)
CHLORIDE SERPL-SCNC: 91 MMOL/L — LOW (ref 96–108)
CHLORIDE SERPL-SCNC: 92 MMOL/L — LOW (ref 96–108)
CHLORIDE SERPL-SCNC: 97 MMOL/L — SIGNIFICANT CHANGE UP (ref 96–108)
CO2 SERPL-SCNC: 17 MMOL/L — LOW (ref 22–29)
CO2 SERPL-SCNC: 20 MMOL/L — LOW (ref 22–29)
CO2 SERPL-SCNC: 21 MMOL/L — LOW (ref 22–29)
CO2 SERPL-SCNC: 22 MMOL/L — SIGNIFICANT CHANGE UP (ref 22–29)
COLOR SPEC: YELLOW — SIGNIFICANT CHANGE UP
CREAT SERPL-MCNC: 0.42 MG/DL — LOW (ref 0.5–1.3)
CREAT SERPL-MCNC: 0.44 MG/DL — LOW (ref 0.5–1.3)
CREAT SERPL-MCNC: 0.45 MG/DL — LOW (ref 0.5–1.3)
CREAT SERPL-MCNC: <0.2 MG/DL — LOW (ref 0.5–1.3)
DIFF PNL FLD: ABNORMAL
EGFR: 115 ML/MIN/1.73M2 — SIGNIFICANT CHANGE UP
EGFR: 94 ML/MIN/1.73M2 — SIGNIFICANT CHANGE UP
EGFR: 95 ML/MIN/1.73M2 — SIGNIFICANT CHANGE UP
EGFR: 96 ML/MIN/1.73M2 — SIGNIFICANT CHANGE UP
GLUCOSE SERPL-MCNC: 113 MG/DL — HIGH (ref 70–99)
GLUCOSE SERPL-MCNC: 115 MG/DL — HIGH (ref 70–99)
GLUCOSE SERPL-MCNC: 151 MG/DL — HIGH (ref 70–99)
GLUCOSE SERPL-MCNC: 85 MG/DL — SIGNIFICANT CHANGE UP (ref 70–99)
GLUCOSE UR QL: NEGATIVE MG/DL — SIGNIFICANT CHANGE UP
HCT VFR BLD CALC: 37.8 % — SIGNIFICANT CHANGE UP (ref 34.5–45)
HGB BLD-MCNC: 13.2 G/DL — SIGNIFICANT CHANGE UP (ref 11.5–15.5)
KETONES UR-MCNC: ABNORMAL MG/DL
LEUKOCYTE ESTERASE UR-ACNC: ABNORMAL
MAGNESIUM SERPL-MCNC: 1.3 MG/DL — LOW (ref 1.6–2.6)
MAGNESIUM SERPL-MCNC: 2.6 MG/DL — SIGNIFICANT CHANGE UP (ref 1.6–2.6)
MCHC RBC-ENTMCNC: 29.7 PG — SIGNIFICANT CHANGE UP (ref 27–34)
MCHC RBC-ENTMCNC: 34.9 GM/DL — SIGNIFICANT CHANGE UP (ref 32–36)
MCV RBC AUTO: 84.9 FL — SIGNIFICANT CHANGE UP (ref 80–100)
NITRITE UR-MCNC: POSITIVE
OSMOLALITY SERPL: 277 MOSMOL/KG — LOW (ref 280–301)
PH UR: 7 — SIGNIFICANT CHANGE UP (ref 5–8)
PHOSPHATE SERPL-MCNC: 1.9 MG/DL — LOW (ref 2.4–4.7)
PHOSPHATE SERPL-MCNC: 2.1 MG/DL — LOW (ref 2.4–4.7)
PLATELET # BLD AUTO: 342 K/UL — SIGNIFICANT CHANGE UP (ref 150–400)
POTASSIUM SERPL-MCNC: 2.8 MMOL/L — CRITICAL LOW (ref 3.5–5.3)
POTASSIUM SERPL-MCNC: 3.8 MMOL/L — SIGNIFICANT CHANGE UP (ref 3.5–5.3)
POTASSIUM SERPL-MCNC: 3.9 MMOL/L — SIGNIFICANT CHANGE UP (ref 3.5–5.3)
POTASSIUM SERPL-MCNC: 5 MMOL/L — SIGNIFICANT CHANGE UP (ref 3.5–5.3)
POTASSIUM SERPL-SCNC: 2.8 MMOL/L — CRITICAL LOW (ref 3.5–5.3)
POTASSIUM SERPL-SCNC: 3.8 MMOL/L — SIGNIFICANT CHANGE UP (ref 3.5–5.3)
POTASSIUM SERPL-SCNC: 3.9 MMOL/L — SIGNIFICANT CHANGE UP (ref 3.5–5.3)
POTASSIUM SERPL-SCNC: 5 MMOL/L — SIGNIFICANT CHANGE UP (ref 3.5–5.3)
PROT UR-MCNC: 30 MG/DL
RBC # BLD: 4.45 M/UL — SIGNIFICANT CHANGE UP (ref 3.8–5.2)
RBC # FLD: 13.9 % — SIGNIFICANT CHANGE UP (ref 10.3–14.5)
RBC CASTS # UR COMP ASSIST: 2 /HPF — SIGNIFICANT CHANGE UP (ref 0–4)
SODIUM SERPL-SCNC: 126 MMOL/L — LOW (ref 135–145)
SODIUM SERPL-SCNC: 126 MMOL/L — LOW (ref 135–145)
SODIUM SERPL-SCNC: 129 MMOL/L — LOW (ref 135–145)
SODIUM SERPL-SCNC: 132 MMOL/L — LOW (ref 135–145)
SP GR SPEC: 1.01 — SIGNIFICANT CHANGE UP (ref 1–1.03)
SQUAMOUS # UR AUTO: 0 /HPF — SIGNIFICANT CHANGE UP (ref 0–5)
UROBILINOGEN FLD QL: 1 MG/DL — SIGNIFICANT CHANGE UP (ref 0.2–1)
WBC # BLD: 14.01 K/UL — HIGH (ref 3.8–10.5)
WBC # FLD AUTO: 14.01 K/UL — HIGH (ref 3.8–10.5)
WBC UR QL: 67 /HPF — HIGH (ref 0–5)

## 2024-05-03 PROCEDURE — 70450 CT HEAD/BRAIN W/O DYE: CPT | Mod: 26

## 2024-05-03 PROCEDURE — 99233 SBSQ HOSP IP/OBS HIGH 50: CPT | Mod: FS

## 2024-05-03 PROCEDURE — 99233 SBSQ HOSP IP/OBS HIGH 50: CPT

## 2024-05-03 RX ORDER — SODIUM BICARBONATE 1 MEQ/ML
1300 SYRINGE (ML) INTRAVENOUS THREE TIMES A DAY
Refills: 0 | Status: DISCONTINUED | OUTPATIENT
Start: 2024-05-03 | End: 2024-05-04

## 2024-05-03 RX ORDER — UREA 15 G
15 POWDER IN PACKET (EA) ORAL
Refills: 0 | Status: DISCONTINUED | OUTPATIENT
Start: 2024-05-03 | End: 2024-05-04

## 2024-05-03 RX ORDER — METOPROLOL TARTRATE 50 MG
5 TABLET ORAL ONCE
Refills: 0 | Status: COMPLETED | OUTPATIENT
Start: 2024-05-03 | End: 2024-05-03

## 2024-05-03 RX ORDER — SODIUM CHLORIDE 9 MG/ML
2 INJECTION INTRAMUSCULAR; INTRAVENOUS; SUBCUTANEOUS
Refills: 0 | Status: DISCONTINUED | OUTPATIENT
Start: 2024-05-03 | End: 2024-05-03

## 2024-05-03 RX ORDER — POTASSIUM CHLORIDE 20 MEQ
40 PACKET (EA) ORAL ONCE
Refills: 0 | Status: COMPLETED | OUTPATIENT
Start: 2024-05-03 | End: 2024-05-03

## 2024-05-03 RX ORDER — HYDRALAZINE HCL 50 MG
25 TABLET ORAL THREE TIMES A DAY
Refills: 0 | Status: DISCONTINUED | OUTPATIENT
Start: 2024-05-03 | End: 2024-05-13

## 2024-05-03 RX ORDER — SODIUM,POTASSIUM PHOSPHATES 278-250MG
1 POWDER IN PACKET (EA) ORAL ONCE
Refills: 0 | Status: COMPLETED | OUTPATIENT
Start: 2024-05-03 | End: 2024-05-03

## 2024-05-03 RX ORDER — CALCIUM GLUCONATE 100 MG/ML
1 VIAL (ML) INTRAVENOUS ONCE
Refills: 0 | Status: COMPLETED | OUTPATIENT
Start: 2024-05-03 | End: 2024-05-03

## 2024-05-03 RX ORDER — MAGNESIUM SULFATE 500 MG/ML
2 VIAL (ML) INJECTION ONCE
Refills: 0 | Status: COMPLETED | OUTPATIENT
Start: 2024-05-03 | End: 2024-05-03

## 2024-05-03 RX ORDER — POTASSIUM CHLORIDE 20 MEQ
10 PACKET (EA) ORAL
Refills: 0 | Status: COMPLETED | OUTPATIENT
Start: 2024-05-03 | End: 2024-05-03

## 2024-05-03 RX ORDER — SODIUM BICARBONATE 1 MEQ/ML
1300 SYRINGE (ML) INTRAVENOUS ONCE
Refills: 0 | Status: COMPLETED | OUTPATIENT
Start: 2024-05-03 | End: 2024-05-03

## 2024-05-03 RX ADMIN — Medication 100 GRAM(S): at 05:52

## 2024-05-03 RX ADMIN — Medication 75 MILLIGRAM(S): at 15:25

## 2024-05-03 RX ADMIN — VALSARTAN 80 MILLIGRAM(S): 80 TABLET ORAL at 22:08

## 2024-05-03 RX ADMIN — VALSARTAN 80 MILLIGRAM(S): 80 TABLET ORAL at 05:20

## 2024-05-03 RX ADMIN — Medication 10 MILLIGRAM(S): at 12:16

## 2024-05-03 RX ADMIN — HEPARIN SODIUM 5000 UNIT(S): 5000 INJECTION INTRAVENOUS; SUBCUTANEOUS at 05:20

## 2024-05-03 RX ADMIN — Medication 10 MILLIGRAM(S): at 06:52

## 2024-05-03 RX ADMIN — VALSARTAN 80 MILLIGRAM(S): 80 TABLET ORAL at 15:23

## 2024-05-03 RX ADMIN — Medication 75 MILLIGRAM(S): at 05:19

## 2024-05-03 RX ADMIN — Medication 100 MILLIEQUIVALENT(S): at 06:52

## 2024-05-03 RX ADMIN — ANASTROZOLE 1 MILLIGRAM(S): 1 TABLET ORAL at 12:16

## 2024-05-03 RX ADMIN — Medication 10 MILLIGRAM(S): at 02:31

## 2024-05-03 RX ADMIN — Medication 25 GRAM(S): at 08:04

## 2024-05-03 RX ADMIN — Medication 10 MILLIGRAM(S): at 18:58

## 2024-05-03 RX ADMIN — Medication 100 MILLIEQUIVALENT(S): at 08:04

## 2024-05-03 RX ADMIN — Medication 25 MILLIGRAM(S): at 22:08

## 2024-05-03 RX ADMIN — Medication 63.75 MILLIMOLE(S): at 17:58

## 2024-05-03 RX ADMIN — Medication 10 MILLIGRAM(S): at 00:26

## 2024-05-03 RX ADMIN — Medication 1 PACKET(S): at 15:33

## 2024-05-03 RX ADMIN — Medication 5 MILLIGRAM(S): at 20:12

## 2024-05-03 RX ADMIN — SODIUM CHLORIDE 2 GRAM(S): 9 INJECTION INTRAMUSCULAR; INTRAVENOUS; SUBCUTANEOUS at 15:18

## 2024-05-03 RX ADMIN — Medication 100 MILLIEQUIVALENT(S): at 05:52

## 2024-05-03 RX ADMIN — Medication 1 PACKET(S): at 08:04

## 2024-05-03 RX ADMIN — Medication 75 MICROGRAM(S): at 05:20

## 2024-05-03 RX ADMIN — Medication 25 MILLIGRAM(S): at 15:26

## 2024-05-03 RX ADMIN — Medication 1300 MILLIGRAM(S): at 18:40

## 2024-05-03 RX ADMIN — Medication 75 MILLIGRAM(S): at 22:08

## 2024-05-03 RX ADMIN — Medication 15 GRAM(S): at 17:53

## 2024-05-03 RX ADMIN — LATANOPROST 1 DROP(S): 0.05 SOLUTION/ DROPS OPHTHALMIC; TOPICAL at 22:47

## 2024-05-03 RX ADMIN — HEPARIN SODIUM 5000 UNIT(S): 5000 INJECTION INTRAVENOUS; SUBCUTANEOUS at 22:08

## 2024-05-03 RX ADMIN — HEPARIN SODIUM 5000 UNIT(S): 5000 INJECTION INTRAVENOUS; SUBCUTANEOUS at 15:24

## 2024-05-03 RX ADMIN — Medication 40 MILLIEQUIVALENT(S): at 05:52

## 2024-05-03 NOTE — PROGRESS NOTE ADULT - PROBLEM SELECTOR PLAN 1
.  - [id number]  - now s/p intraventricular bleed on Eliquis, endorses fall prior to initiation of Eliquis but did not communicate to team  - Currently Afib rates 90- 150   - continue Metoprolol 75mg PO q8H with hold parameters, can increase to q6H if need better HR control  - TTE with EF 55-60%, normal RV mild to mod TR, severe pHTN  - has z patch from primary cardiologist, her physician will follow up  - will also need OP evaluation for LAAO when current medical problems resolve.

## 2024-05-03 NOTE — PROGRESS NOTE ADULT - ASSESSMENT
84 y/o female with PMHx HTN, Breast Ca (dx 2020, surgery, no chemo/radiation) and A Fib (started taking  Eliquisx1 week ago, last dose 4/28 AM) BIBA to [facility]  from home due to headache. Patient had recently been admitted due to HTN after running out of her coreg. CT head found with intraventricular bleed and was transferred to [facility] ED for further evaluation. She was given Andexxa for Eliquis reversal and BP was controlled with nicardipine for SBP > 200s. She recently lost her  and has not been compliant with medications. She is now weaned off nicardipine. She denies chest pain, back pain, SOB, LOVELACE, diaphoresis, syncope or N/V. Of note patient endorses that she had fallen and hit her head a couple of days before initiation of Eliquis but did not communicate to team at that time.

## 2024-05-03 NOTE — PROGRESS NOTE ADULT - PROBLEM SELECTOR PLAN 2
.  - Goal SBP under 140 high 176/98 in past 24 hours  - cont metoprolol as above  - valsartan to 80mg PO TID  - will discuss additional medications with attending .  - Goal SBP under 140 high 176/98 in past 24 hours  - cont metoprolol as above  - valsartan to 80mg PO TID  - added hydralazine 25mg PO TID

## 2024-05-03 NOTE — PROGRESS NOTE ADULT - SUBJECTIVE AND OBJECTIVE BOX
[facility]                                                         CARDIOLOGY AT [facility]                                                                  [address] [facility], [id number] [place] [address]- [facility]                                                         Telephone: [phone]. Fax:[phone]                                                                             PROGRESS NOTE      Reason for follow up: Afib, HTN  Last 24h Telemetry: Afib RVR   Overall Plan: BP remains elevated      Review of symptoms:   Cardiac:  No chest pain. No dyspnea. No palpitations.  Respiratory: no cough. No dyspnea  Gastrointestinal: No diarrhea. No abdominal pain. No bleeding.   Neuro: No focal neuro complaints.      Vitals:  T(C): 37.1 (05-03-24 @ 04:20), Max: 37.2 (05-02-24 @ 20:18)  HR: 108 (05-03-24 @ 08:25) (84 - 135)  BP: 127/107 (05-03-24 @ 08:25) (127/107 - 176/98)  RR: 18 (05-03-24 @ 08:25) (16 - 18)  SpO2: 97% (05-03-24 @ 08:25) (95% - 100%)      I&O's Summary    02 May 2024 07:01  -  03 May 2024 07:00  --------------------------------------------------------  IN: 180 mL / OUT: 700 mL / NET: -520 mL      Weight (kg): 61.6 (04-29 @ 00:36)      PHYSICAL EXAM:  Appearance: Comfortable. No acute distress  HEENT:  Atraumatic. Normocephalic.  Normal oral mucosa  Neurologic: A & O x 2, no gross focal deficits, slow to follow commands  Cardiovascular: RRR S1 S2, No murmur, no rubs/gallops. No JVD  Respiratory: Lungs clear to auscultation, unlabored   Gastrointestinal:  Soft, Non-tender, + BS  Lower Extremities: No edema  Psychiatry: Patient is calm. No agitation.   Skin: warm and dry.        CURRENT CARDIAC MEDICATIONS:  hydrALAZINE Injectable 10 milliGRAM(s) IV Push every 2 hours PRN  labetalol Injectable 10 milliGRAM(s) IV Push every 2 hours PRN  metoprolol tartrate 75 milliGRAM(s) Oral every 8 hours  valsartan 80 milliGRAM(s) Oral <User Schedule>        CURRENT OTHER MEDICATIONS:  acetaminophen     Tablet .. 650 milliGRAM(s) Oral every 6 hours PRN Mild Pain (1 - 3)  senna 2 Tablet(s) Oral at bedtime PRN Constipation  levothyroxine 75 MICROGram(s) Oral daily  anastrozole 1 milliGRAM(s) Oral daily  heparin   Injectable 5000 Unit(s) SubCutaneous every 8 hours  influenza  Vaccine (HIGH DOSE) 0.7 milliLiter(s) IntraMuscular once  latanoprost 0.005% Ophthalmic Solution 1 Drop(s) Both EYES at bedtime  potassium phosphate / sodium phosphate Powder (PHOS-NaK) 1 Packet(s) Oral once, Stop order after: 1 Doses        LABS:	 	                 13.2   14.01 )-----------( 342      ( 03 May 2024 04:41 )             37.8     05-03    132<L>  |  103  |  18.8  ----------------------------<  85  2.8<LL>   |  17.0<L>  |  <0.20<L>    Ca    5.8<LL>      03 May 2024 04:33  Phos  1.9     05-03  Mg     1.3     05-03    TPro  6.0<L>  /  Alb  3.5  /  TBili  1.3  /  DBili  x   /  AST  16  /  ALT  25  /  AlkPhos  68  05-02    PT/INR/PTT ( 29 Apr 2024 06:00 )                       :                       :      13.8         :       27.9                  .        .                   .              .           .       1.25        .                                       Lipid Profile: Date: 04-29 @ 06:00  Total cholesterol 151; Direct LDL: --; HDL: 79; Triglycerides:62        TSH: Thyroid Stimulating Hormone, Serum: 1.24 uIU/mL        TELEMETRY: Afib Aflutter Rates       DIAGNOSTIC TESTING:  [ ] Echocardiogram:   < from: TTE W or WO Ultrasound Enhancing Agent (04.30.24 @ 14:03) >  _______________________________________________________________________________________     CONCLUSIONS:      1. Limited study f/u for LV function.   2. Left ventricular cavity is normal in size. Left ventricular systolic function is normal with an ejection fraction visually estimated at 55 to 60 %.   3. Normal right ventricular cavity size and normal systolic function.   4. Mild to moderate tricuspid regurgitation.   5. No pericardial effusion seen.   6. Estimated pulmonary artery systolic pressure is 60 mmHg, consistent with severe pulmonary hypertension.    ________________________________________________________________________________________  FINDINGS:     Left Ventricle:  The left ventricular cavity is normal in size. Left ventricular systolic function is normal with an ejection fraction visually estimated at 55 to 60%.     Right Ventricle:  The right ventricular cavity is normal in size and normal systolic function.     Tricuspid Valve:  Structurally normal tricuspid valve with normal leaflet excursion. There is mild to moderate tricuspid regurgitation. Estimated pulmonary artery systolic pressure is 60 mmHg, consistent with severe pulmonary hypertension.     Pericardium:  No pericardial effusion seen.     Systemic Veins:  The inferior vena cava is dilated measuring 2.58 cm in diameter, (dilated >2.1cm) with abnormal inspiratory collapse (abnormal <50%) consistent with elevated right atrial pressure (~15, range 10-20mmHg).  ____________________________________________________________________    < end of copied text >

## 2024-05-03 NOTE — PROGRESS NOTE ADULT - SUBJECTIVE AND OBJECTIVE BOX
NSx PA Note    HPI  HPI:   84 y/o female with PMHx HTN and A Fib (started taking  Eliquisx1 week ago, last dose 4/28 AM) BIBA from home due to headache. pt reports her blood pressure was elevated earlier today but could not recall the number. According to EMS< BP around 206 systolic. pt currently on Carvedilol. pt unable to recall other bp meds. pt denies cp, sob, visual changes, slurred speech, fall/head injury, numbness/weakness, or any other complaints. ICH 3   (28 Apr 2024 22:41)      Interval/Overnight Events   Patient seen sitting in a chair bedside. Intermittently following commands and tracking. Exam seems to be waxing and waning per nursing. Patient denies headache, dizzines,s change in vision, n/v, weakness. States she does feel "a little off."     MEDICATIONS  (STANDING):  anastrozole 1 milliGRAM(s) Oral daily  heparin   Injectable 5000 Unit(s) SubCutaneous every 8 hours  influenza  Vaccine (HIGH DOSE) 0.7 milliLiter(s) IntraMuscular once  latanoprost 0.005% Ophthalmic Solution 1 Drop(s) Both EYES at bedtime  levothyroxine 75 MICROGram(s) Oral daily  metoprolol tartrate 75 milliGRAM(s) Oral every 8 hours  potassium phosphate / sodium phosphate Powder (PHOS-NaK) 1 Packet(s) Oral once  valsartan 80 milliGRAM(s) Oral <User Schedule>    MEDICATIONS  (PRN):  acetaminophen     Tablet .. 650 milliGRAM(s) Oral every 6 hours PRN Mild Pain (1 - 3)  hydrALAZINE Injectable 10 milliGRAM(s) IV Push every 2 hours PRN SBP> 150 and DBP> 90  labetalol Injectable 10 milliGRAM(s) IV Push every 2 hours PRN SBP> 150 and DBP> 90  senna 2 Tablet(s) Oral at bedtime PRN Constipation    Vital Signs Last 24 Hrs  T(C): 36.9 (03 May 2024 07:22), Max: 37.2 (02 May 2024 20:18)  T(F): 98.5 (03 May 2024 07:22), Max: 99 (03 May 2024 00:36)  HR: 124 (03 May 2024 12:00) (87 - 135)  BP: 165/135 (03 May 2024 12:00) (127/107 - 176/98)  BP(mean): 146 (03 May 2024 12:00) (93 - 146)  RR: 20 (03 May 2024 12:00) (16 - 20)  SpO2: 97% (03 May 2024 12:00) (95% - 100%)    Parameters below as of 03 May 2024 12:00  Patient On (Oxygen Delivery Method): room air    Neuro- Awake, alert, oriented x 3  speech minimal but appropriate  tracking, pupils clear and appropriate, EOMI  follows commands  CRANDALL well in chair   5/5 in all extremities   sensate intact to LT                           13.2   14.01 )-----------( 342      ( 03 May 2024 04:41 )             37.8   05-03    129<L>  |  97  |  24.6<H>  ----------------------------<  113<H>  5.0   |  20.0<L>  |  0.45<L>    Ca    8.5      03 May 2024 09:21  Phos  1.9     05-03  Mg     2.6     05-03    TPro  6.0<L>  /  Alb  3.5  /  TBili  1.3  /  DBili  x   /  AST  16  /  ALT  25  /  AlkPhos  68  05-02    Plan:  NS stable  Mildly symptomatic hyponatremia- if patient's condition worsens, consider icu for management   Renal consult pending for electrolyte and fluid management, goal normonatremia   Continue Na checks q6-8 hours, daily serum osmols, consider tighter fluid restriction, strict I/O  Q2 neuro checks   Rpt CTH if any neurological decline  MRI Brain still pending  Eliquis on hold   SQH dvt ppx, SCDs in the bed  Incentive romel   Bowel regimen   Seen by Dr. Myrick. Signing off, reconsult as needed or call with questions. When MRI complete, call with concerns. Defer management to primary team and renal.

## 2024-05-03 NOTE — PROGRESS NOTE ADULT - SUBJECTIVE AND OBJECTIVE BOX
Preliminary note, official recommendations pending attending review/signature     [facility] Stroke Team  Progress Note     HPI:  82 y/o female with PMHx HTN and A Fib (reportedly only started taking Eliquis x 1 week prior to admission, last dose 4/28 AM), hx of Breast CA? on Anastrazole, who presented to [place] ED complaining of headache on 4/28/24. Patient is a poor historian so hx obtained from chart. Per notes from [place], EMS reported SBP > 200 upon arrival to ED. Upon arrival to [place], CTH was performed and showed an intraventricular hemorrhage. Patient was reversed w/ Andexxa and started on cardene gtt, transferred to [facility] for further workup and care. Patient reportedly lost her  in January and had been taking care of him, and has been having difficulty taking care of herself since his passing. ICH 3 on admission. Patient admitted to Neuro ICU for further monitoring and care.     SUBJECTIVE: Patient seen and examined at bedside. Overnight, patient with lethargy, found to have worsening hyponatremia to 125 @ 1920, despite interventions. Patient initiated on 2% Hypertonic Sodium Chloride @ 30 cc's/Hr Stat per Dr. Quintana overnight, repeat BMPs Q 6 hrs, CT Head ordered. PO salt tabs d/c'd. Repeat Na 132 @ 0430. 2% Sodium Chloride d/c'd as concern Na was rising too quickly. Repeat Na 129 @ 0930. PO salt tabs reinitiated. Pending nephrology c/s. CT Head was stable. No new neurologic complaints.  ROS reported negative unless otherwise noted.    MEDICATIONS:  acetaminophen     Tablet .. 650 milliGRAM(s) Oral every 6 hours PRN  anastrozole 1 milliGRAM(s) Oral daily  heparin   Injectable 5000 Unit(s) SubCutaneous every 8 hours  hydrALAZINE 25 milliGRAM(s) Oral three times a day  hydrALAZINE Injectable 10 milliGRAM(s) IV Push every 2 hours PRN  influenza  Vaccine (HIGH DOSE) 0.7 milliLiter(s) IntraMuscular once  labetalol Injectable 10 milliGRAM(s) IV Push every 2 hours PRN  latanoprost 0.005% Ophthalmic Solution 1 Drop(s) Both EYES at bedtime  levothyroxine 75 MICROGram(s) Oral daily  metoprolol tartrate 75 milliGRAM(s) Oral every 8 hours  potassium phosphate / sodium phosphate Powder (PHOS-NaK) 1 Packet(s) Oral once  senna 2 Tablet(s) Oral at bedtime PRN  sodium chloride 2 Gram(s) Oral two times a day  valsartan 80 milliGRAM(s) Oral <User Schedule>      Vital Signs Last 24 Hrs  T(C): 36.9 (03 May 2024 07:22), Max: 37.2 (02 May 2024 20:18)  T(F): 98.5 (03 May 2024 07:22), Max: 99 (03 May 2024 00:36)  HR: 124 (03 May 2024 12:00) (87 - 124)  BP: 165/135 (03 May 2024 12:00) (127/107 - 176/98)  BP(mean): 146 (03 May 2024 12:00) (93 - 146)  RR: 20 (03 May 2024 12:00) (16 - 20)  SpO2: 97% (03 May 2024 12:00) (95% - 100%)    Parameters below as of 03 May 2024 12:00  Patient On (Oxygen Delivery Method): room air      PHYSICAL EXAM:  General: No acute distress.   HEENT: Head normocephalic, atraumatic. Conjunctivae clear w/o exudates or hemorrhage. Sclera non-icteric. Nares are patent bilaterally. Mucous membranes pink and moist.  No tonsillar swelling or exudates.    Neck: Supple, no adenopathy. Trachea midline. No JVD.  Cardiac: Normal rate and irregular rhythm. S1, S2 auscultated. No murmurs, gallops, or rubs.     Respiratory: Lung sounds clear in all fields. Chest wall symmetric, nontender.   Abdominal: Soft, nondistended, nontender. Bowel sounds normoactive x 4 quadrants. No masses, hepatomegaly, or splenomegaly.   Skin: Skin is warm, dry and intact without rashes or lesions. Appropriate color for ethnicity. Nailbeds pink with no cyanosis or clubbing.   Extremities: No edema, 2+ peripheral pulses in b/l upper and b/l lower extremities. Full range of motion in all joints.     NEUROLOGICAL EXAM:  Mental status: Patient appears more somnolent today, awakens easily. Upon awakening, patient is alert, oriented x 2, oriented to self, place, disoriented to year and month. Patient requires repetitive prompting to stay awake during exam. Speech fluent, follows commands, no neglect, short-term memory appears to be impaired, patient does not recall why she is in the hospital, states "it's because I have problems"    Cranial Nerves: No facial asymmetry, no nystagmus, no dysarthria,  tongue midline  Motor exam: Normal tone, no drift, 5/5 RUE, 5/5 RLE, 5/5 LUE, 5/5 LLE, normal fine finger movements.  Sensation: Intact to light touch   Coordination: no dysmetria  Gait: deferred      LABS:                        13.2   14.01 )-----------( 342      ( 03 May 2024 04:41 )             37.8    05-03    129<L>  |  97  |  24.6<H>  ----------------------------<  113<H>  5.0   |  20.0<L>  |  0.45<L>    Ca    8.5      03 May 2024 09:21  Phos  1.9     05-03  Mg     2.6     05-03    TPro  6.0<L>  /  Alb  3.5  /  TBili  1.3  /  DBili  x   /  AST  16  /  ALT  25  /  AlkPhos  68  05-02      LDL 60  A1C: 5.3      IMAGING:    NEUROIMAGING    CT Head No Cont (05.03.24 @ 02:57)  IMPRESSION:  Left caudate nucleus intraparenchymal hemorrhage with intraventricular   extension, grossly stable overall. Mild hydrocephalus is grossly stable.    CT Head No Cont (05.01.24 @ 08:30)   IMPRESSION: Stable exam.    CT Head No Cont (04.29.24 @ 20:33)   IMPRESSION: Stable acute small left caudate head intraparenchymal   hemorrhage measuring 6 mm. Moderate intraventricular hemorrhage   throughout the left lateral ventricle, third ventricle and fourth   ventricle. Mild intraventricular hemorrhage in the right lateral   ventricle. Mild to moderate hydrocephalus, unchanged..    CT Head No Cont (04.28.24 @ 22:53)  IMPRESSION:  No change inintraventricular hemorrhage and dilatation of the lateral   and third ventricles compared with prior exam. Ill-defined hemorrhage   again seen in the left caudate body and left caudate head.    (04.28.24 @ 20:02)  IMPRESSION:  CTA NECK:  No significant stenosis of the cervical carotid arteries based   on NASCET criteria. Patent cervical vertebral arteries.    CTA HEAD: No high-grade stenosis or occlusion of the major proximal   arterial branches. No evidence of an intracranial arterial aneurysm or   arteriovenous malformation on CTA. No evidence of active bleeding within   the left caudate and intraventricular hemorrhage.    CT Brain Stroke Protocol (04.28.24 @ 20:02)  IMPRESSION:  Intraventricular hemorrhage, as described above. Ill-defined parenchymal   hemorrhage within the left caudate body and caudate head. Mild dilatation   of the lateral and third ventricles.    ULTRASOUND    US Duplex Venous Lower Ext Complete, Bilateral (04.29.24 @ 08:33)  IMPRESSION:  No evidence of deep venous thrombosis in either lower extremity.  Right Baker's cyst.    ECHO    TTE W or WO Ultrasound Enhancing Agent (04.30.24 @ 14:03)   CONCLUSIONS:   1. Limited study f/u for LV function.   2. Left ventricular cavity is normal in size. Left ventricular systolic function is normal with an ejection fraction visually estimated at 55 to 60 %.   3. Normal right ventricular cavity size and normal systolic function.   4. Mild to moderate tricuspid regurgitation.   5. No pericardial effusion seen.   6. Estimated pulmonary artery systolic pressure is 60 mmHg, consistent with severe pulmonary hypertension. Preliminary note, official recommendations pending attending review/signature     [facility] Stroke Team  Progress Note     HPI:  84 y/o female with PMHx HTN and A Fib (reportedly only started taking Eliquis x 1 week prior to admission, last dose 4/28 AM), hx of Breast CA? on Anastrazole, who presented to [place] ED complaining of headache on 4/28/24. Patient is a poor historian so hx obtained from chart. Per notes from [place], EMS reported SBP > 200 upon arrival to ED. Upon arrival to [place], CTH was performed and showed an intraventricular hemorrhage. Patient was reversed w/ Andexxa and started on cardene gtt, transferred to [facility] for further workup and care. Patient reportedly lost her  in January and had been taking care of him, and has been having difficulty taking care of herself since his passing. ICH 3 on admission. Patient admitted to Neuro ICU for further monitoring and care.     SUBJECTIVE: Patient seen and examined at bedside. Overnight, patient with lethargy, found to have worsening hyponatremia to 125 @ 1920, despite interventions. Patient initiated on 2% Hypertonic Sodium Chloride @ 30 cc's/Hr Stat per Dr. Quintana overnight, repeat BMPs Q 6 hrs, CT Head ordered. PO salt tabs d/c'd. Repeat Na 132 @ 0430. 2% Sodium Chloride d/c'd as concern Na was rising too quickly. Repeat Na 129 @ 0930. PO salt tabs reinitiated. Pending nephrology c/s. CT Head was stable. Additionally overnight patient had Potassium 2.8, Calcium 5.8, and Magnesium 1.3 on AM labs. No new neurologic complaints.  ROS reported negative unless otherwise noted.    MEDICATIONS:  acetaminophen     Tablet .. 650 milliGRAM(s) Oral every 6 hours PRN  anastrozole 1 milliGRAM(s) Oral daily  heparin   Injectable 5000 Unit(s) SubCutaneous every 8 hours  hydrALAZINE 25 milliGRAM(s) Oral three times a day  hydrALAZINE Injectable 10 milliGRAM(s) IV Push every 2 hours PRN  influenza  Vaccine (HIGH DOSE) 0.7 milliLiter(s) IntraMuscular once  labetalol Injectable 10 milliGRAM(s) IV Push every 2 hours PRN  latanoprost 0.005% Ophthalmic Solution 1 Drop(s) Both EYES at bedtime  levothyroxine 75 MICROGram(s) Oral daily  metoprolol tartrate 75 milliGRAM(s) Oral every 8 hours  potassium phosphate / sodium phosphate Powder (PHOS-NaK) 1 Packet(s) Oral once  senna 2 Tablet(s) Oral at bedtime PRN  sodium chloride 2 Gram(s) Oral two times a day  valsartan 80 milliGRAM(s) Oral <User Schedule>      Vital Signs Last 24 Hrs  T(C): 36.9 (03 May 2024 07:22), Max: 37.2 (02 May 2024 20:18)  T(F): 98.5 (03 May 2024 07:22), Max: 99 (03 May 2024 00:36)  HR: 124 (03 May 2024 12:00) (87 - 124)  BP: 165/135 (03 May 2024 12:00) (127/107 - 176/98)  BP(mean): 146 (03 May 2024 12:00) (93 - 146)  RR: 20 (03 May 2024 12:00) (16 - 20)  SpO2: 97% (03 May 2024 12:00) (95% - 100%)    Parameters below as of 03 May 2024 12:00  Patient On (Oxygen Delivery Method): room air      PHYSICAL EXAM:  General: No acute distress.   HEENT: Head normocephalic, atraumatic. Conjunctivae clear w/o exudates or hemorrhage. Sclera non-icteric. Nares are patent bilaterally. Mucous membranes pink and moist.  No tonsillar swelling or exudates.    Neck: Supple, no adenopathy. Trachea midline. No JVD.  Cardiac: Normal rate and irregular rhythm. S1, S2 auscultated. No murmurs, gallops, or rubs.     Respiratory: Lung sounds clear in all fields. Chest wall symmetric, nontender.   Abdominal: Soft, nondistended, nontender. Bowel sounds normoactive x 4 quadrants. No masses, hepatomegaly, or splenomegaly.   Skin: Skin is warm, dry and intact without rashes or lesions. Appropriate color for ethnicity. Nailbeds pink with no cyanosis or clubbing.   Extremities: No edema, 2+ peripheral pulses in b/l upper and b/l lower extremities. Full range of motion in all joints.     NEUROLOGICAL EXAM:  Mental status: Patient appears more somnolent today, awakens easily. Upon awakening, patient is alert, oriented x 2, oriented to self, place, disoriented to year and month. Patient requires repetitive prompting to stay awake during exam. Speech fluent, follows commands, no neglect, short-term memory appears to be impaired, patient does not recall why she is in the hospital, states "it's because I have problems"    Cranial Nerves: No facial asymmetry, no nystagmus, no dysarthria,  tongue midline  Motor exam: Normal tone, no drift, 5/5 RUE, 5/5 RLE, 5/5 LUE, 5/5 LLE, normal fine finger movements.  Sensation: Intact to light touch   Coordination: no dysmetria  Gait: deferred      LABS:                        13.2   14.01 )-----------( 342      ( 03 May 2024 04:41 )             37.8    05-03    129<L>  |  97  |  24.6<H>  ----------------------------<  113<H>  5.0   |  20.0<L>  |  0.45<L>    Ca    8.5      03 May 2024 09:21  Phos  1.9     05-03  Mg     2.6     05-03    TPro  6.0<L>  /  Alb  3.5  /  TBili  1.3  /  DBili  x   /  AST  16  /  ALT  25  /  AlkPhos  68  05-02      LDL 60  A1C: 5.3      IMAGING:    NEUROIMAGING    CT Head No Cont (05.03.24 @ 02:57)  IMPRESSION:  Left caudate nucleus intraparenchymal hemorrhage with intraventricular   extension, grossly stable overall. Mild hydrocephalus is grossly stable.    CT Head No Cont (05.01.24 @ 08:30)   IMPRESSION: Stable exam.    CT Head No Cont (04.29.24 @ 20:33)   IMPRESSION: Stable acute small left caudate head intraparenchymal   hemorrhage measuring 6 mm. Moderate intraventricular hemorrhage   throughout the left lateral ventricle, third ventricle and fourth   ventricle. Mild intraventricular hemorrhage in the right lateral   ventricle. Mild to moderate hydrocephalus, unchanged..    CT Head No Cont (04.28.24 @ 22:53)  IMPRESSION:  No change inintraventricular hemorrhage and dilatation of the lateral   and third ventricles compared with prior exam. Ill-defined hemorrhage   again seen in the left caudate body and left caudate head.    (04.28.24 @ 20:02)  IMPRESSION:  CTA NECK:  No significant stenosis of the cervical carotid arteries based   on NASCET criteria. Patent cervical vertebral arteries.    CTA HEAD: No high-grade stenosis or occlusion of the major proximal   arterial branches. No evidence of an intracranial arterial aneurysm or   arteriovenous malformation on CTA. No evidence of active bleeding within   the left caudate and intraventricular hemorrhage.    CT Brain Stroke Protocol (04.28.24 @ 20:02)  IMPRESSION:  Intraventricular hemorrhage, as described above. Ill-defined parenchymal   hemorrhage within the left caudate body and caudate head. Mild dilatation   of the lateral and third ventricles.    ULTRASOUND    US Duplex Venous Lower Ext Complete, Bilateral (04.29.24 @ 08:33)  IMPRESSION:  No evidence of deep venous thrombosis in either lower extremity.  Right Baker's cyst.    ECHO    TTE W or WO Ultrasound Enhancing Agent (04.30.24 @ 14:03)   CONCLUSIONS:   1. Limited study f/u for LV function.   2. Left ventricular cavity is normal in size. Left ventricular systolic function is normal with an ejection fraction visually estimated at 55 to 60 %.   3. Normal right ventricular cavity size and normal systolic function.   4. Mild to moderate tricuspid regurgitation.   5. No pericardial effusion seen.   6. Estimated pulmonary artery systolic pressure is 60 mmHg, consistent with severe pulmonary hypertension.   [facility] Stroke Team  Progress Note     HPI:  84 y/o female with PMHx HTN and A Fib (reportedly only started taking Eliquis x 1 week prior to admission, last dose 4/28 AM), hx of Breast CA? on Anastrazole, who presented to [place] ED complaining of headache on 4/28/24. Patient is a poor historian so hx obtained from chart. Per notes from [place], EMS reported SBP > 200 upon arrival to ED. Upon arrival to [place], CTH was performed and showed an intraventricular hemorrhage. Patient was reversed w/ Andexxa and started on cardene gtt, transferred to [facility] for further workup and care. Patient reportedly lost her  in January and had been taking care of him, and has been having difficulty taking care of herself since his passing. ICH 3 on admission. Patient admitted to Neuro ICU for further monitoring and care.     SUBJECTIVE: Patient seen and examined at bedside. Overnight, patient with lethargy, found to have worsening hyponatremia to 125 @ 1920, despite interventions. Patient initiated on 2% Hypertonic Sodium Chloride @ 30 cc's/Hr Stat per Dr. Quintana overnight, repeat BMPs Q 6 hrs, CT Head ordered. PO salt tabs d/c'd. Repeat Na 132 @ 0430. 2% Sodium Chloride d/c'd as concern Na was rising too quickly. Repeat Na 129 @ 0930. PO salt tabs reinitiated. Pending nephrology c/s. CT Head was stable. Additionally overnight patient had Potassium 2.8, Calcium 5.8, and Magnesium 1.3 on AM labs. No new neurologic complaints.  ROS reported negative unless otherwise noted.    MEDICATIONS:  acetaminophen     Tablet .. 650 milliGRAM(s) Oral every 6 hours PRN  anastrozole 1 milliGRAM(s) Oral daily  heparin   Injectable 5000 Unit(s) SubCutaneous every 8 hours  hydrALAZINE 25 milliGRAM(s) Oral three times a day  hydrALAZINE Injectable 10 milliGRAM(s) IV Push every 2 hours PRN  influenza  Vaccine (HIGH DOSE) 0.7 milliLiter(s) IntraMuscular once  labetalol Injectable 10 milliGRAM(s) IV Push every 2 hours PRN  latanoprost 0.005% Ophthalmic Solution 1 Drop(s) Both EYES at bedtime  levothyroxine 75 MICROGram(s) Oral daily  metoprolol tartrate 75 milliGRAM(s) Oral every 8 hours  potassium phosphate / sodium phosphate Powder (PHOS-NaK) 1 Packet(s) Oral once  senna 2 Tablet(s) Oral at bedtime PRN  sodium chloride 2 Gram(s) Oral two times a day  valsartan 80 milliGRAM(s) Oral <User Schedule>      Vital Signs Last 24 Hrs  T(C): 36.9 (03 May 2024 07:22), Max: 37.2 (02 May 2024 20:18)  T(F): 98.5 (03 May 2024 07:22), Max: 99 (03 May 2024 00:36)  HR: 124 (03 May 2024 12:00) (87 - 124)  BP: 165/135 (03 May 2024 12:00) (127/107 - 176/98)  BP(mean): 146 (03 May 2024 12:00) (93 - 146)  RR: 20 (03 May 2024 12:00) (16 - 20)  SpO2: 97% (03 May 2024 12:00) (95% - 100%)    Parameters below as of 03 May 2024 12:00  Patient On (Oxygen Delivery Method): room air      PHYSICAL EXAM:  General: No acute distress.   HEENT: Head normocephalic, atraumatic. Conjunctivae clear w/o exudates or hemorrhage. Sclera non-icteric. Nares are patent bilaterally. Mucous membranes pink and moist.  No tonsillar swelling or exudates.    Neck: Supple, no adenopathy. Trachea midline. No JVD.  Cardiac: Normal rate and irregular rhythm. S1, S2 auscultated. No murmurs, gallops, or rubs.     Respiratory: Lung sounds clear in all fields. Chest wall symmetric, nontender.   Abdominal: Soft, nondistended, nontender. Bowel sounds normoactive x 4 quadrants. No masses, hepatomegaly, or splenomegaly.   Skin: Skin is warm, dry and intact without rashes or lesions. Appropriate color for ethnicity. Nailbeds pink with no cyanosis or clubbing.   Extremities: No edema, 2+ peripheral pulses in b/l upper and b/l lower extremities. Full range of motion in all joints.     NEUROLOGICAL EXAM:  Mental status: Patient appears more somnolent today, awakens easily. Upon awakening, patient is alert, oriented x 2, oriented to self, place, disoriented to year and month. Patient requires repetitive prompting to stay awake during exam. Speech fluent, follows commands, no neglect, short-term memory appears to be impaired, patient does not recall why she is in the hospital, states "it's because I have problems"    Cranial Nerves: No facial asymmetry, no nystagmus, no dysarthria,  tongue midline  Motor exam: Normal tone, no drift, 5/5 RUE, 5/5 RLE, 5/5 LUE, 5/5 LLE, normal fine finger movements.  Sensation: Intact to light touch   Coordination: no dysmetria  Gait: deferred      LABS:                        13.2   14.01 )-----------( 342      ( 03 May 2024 04:41 )             37.8    05-03    129<L>  |  97  |  24.6<H>  ----------------------------<  113<H>  5.0   |  20.0<L>  |  0.45<L>    Ca    8.5      03 May 2024 09:21  Phos  1.9     05-03  Mg     2.6     05-03    TPro  6.0<L>  /  Alb  3.5  /  TBili  1.3  /  DBili  x   /  AST  16  /  ALT  25  /  AlkPhos  68  05-02      LDL 60  A1C: 5.3      IMAGING:    NEUROIMAGING    CT Head No Cont (05.03.24 @ 02:57)  IMPRESSION:  Left caudate nucleus intraparenchymal hemorrhage with intraventricular   extension, grossly stable overall. Mild hydrocephalus is grossly stable.    CT Head No Cont (05.01.24 @ 08:30)   IMPRESSION: Stable exam.    CT Head No Cont (04.29.24 @ 20:33)   IMPRESSION: Stable acute small left caudate head intraparenchymal   hemorrhage measuring 6 mm. Moderate intraventricular hemorrhage   throughout the left lateral ventricle, third ventricle and fourth   ventricle. Mild intraventricular hemorrhage in the right lateral   ventricle. Mild to moderate hydrocephalus, unchanged..    CT Head No Cont (04.28.24 @ 22:53)  IMPRESSION:  No change inintraventricular hemorrhage and dilatation of the lateral   and third ventricles compared with prior exam. Ill-defined hemorrhage   again seen in the left caudate body and left caudate head.    (04.28.24 @ 20:02)  IMPRESSION:  CTA NECK:  No significant stenosis of the cervical carotid arteries based   on NASCET criteria. Patent cervical vertebral arteries.    CTA HEAD: No high-grade stenosis or occlusion of the major proximal   arterial branches. No evidence of an intracranial arterial aneurysm or   arteriovenous malformation on CTA. No evidence of active bleeding within   the left caudate and intraventricular hemorrhage.    CT Brain Stroke Protocol (04.28.24 @ 20:02)  IMPRESSION:  Intraventricular hemorrhage, as described above. Ill-defined parenchymal   hemorrhage within the left caudate body and caudate head. Mild dilatation   of the lateral and third ventricles.    ULTRASOUND    US Duplex Venous Lower Ext Complete, Bilateral (04.29.24 @ 08:33)  IMPRESSION:  No evidence of deep venous thrombosis in either lower extremity.  Right Baker's cyst.    ECHO    TTE W or WO Ultrasound Enhancing Agent (04.30.24 @ 14:03)   CONCLUSIONS:   1. Limited study f/u for LV function.   2. Left ventricular cavity is normal in size. Left ventricular systolic function is normal with an ejection fraction visually estimated at 55 to 60 %.   3. Normal right ventricular cavity size and normal systolic function.   4. Mild to moderate tricuspid regurgitation.   5. No pericardial effusion seen.   6. Estimated pulmonary artery systolic pressure is 60 mmHg, consistent with severe pulmonary hypertension.

## 2024-05-03 NOTE — CONSULT NOTE ADULT - ASSESSMENT
A) CNS  bleed stable, Hx  breast  ca, A. fib., Hyponatremia   with low mag., K, Phos. ? SIADH.    P) FLuid  restriction, ure-Na, Po supplements, follow up labs.     A) CNS  bleed stable, Hx  breast  ca, A. fib., Hyponatremia   with low mag., K, Phos. ? SIADH. Pt also has NAG metabolic  acidosis -? RTA.    P) FLuid  restriction, ure-Na, Po supplements, follow up labs. Urine  studies. po bicarb.

## 2024-05-03 NOTE — CONSULT NOTE ADULT - SUBJECTIVE AND OBJECTIVE BOX
Patient is a 83y old  Female who presents with a chief complaint of IVH (03 May 2024 14:57)       HPI:   84 y/o female with PMHx HTN and A Fib (started taking  Eliquisx1 week ago, last dose 4/28 AM) BIBA from home due to headache. pt reports her blood pressure was elevated earlier today but could not recall the number. According to EMS< BP around 206 systolic. pt currently on Carvedilol. pt unable to recall other bp meds. pt denies cp, sob, visual changes, slurred speech, fall/head injury, numbness/weakness, or any other complaints. ICH 3. Pt has low  sodium and not sure if in past. Hx prior  breast Ca.      NIH Stroke Scale:   · Intubated	No   · Sedated/Unresponsive	No   · NIH Stroke Scale: LOC	(0) Alert; keenly responsive   · NIH Stroke Scale: LOC Question	(0) Answers both questions correctly   · NIH Stroke Scale: LOC Command	(0) Performs both tasks correctly   · NIH Stroke Scale: Gaze	(0) Normal   · NIH Stroke Scale: Visual	(0) No visual loss   · NIH Stroke Scale: Facial	(0) Normal symmetrical movements   · NIH Stroke Scale: Arm Left	(0) No drift; limb holds 90 (or 45) degrees for full 10 secs   · NIH Stroke Scale: Arm Right	(0) No drift; limb holds 90 (or 45) degrees for full 10 secs   · NIH Stroke Scale: Leg Left	(0) No drift; leg holds 30 degree position for full 5 secs   · NIH Stroke Scale: Leg Right	(0) No drift; leg holds 30 degree position for full 5 secs   · NIH Stroke Scale: Ataxia	(0) Absent   · NIH Stroke Scale: Sensory	(0) Normal; no sensory loss   · NIH Stroke Scale: Language	(0) No aphasia; normal   · NIH Stroke Scale: Dysarthria	(0) Normal   · NIH Stroke Scale: Extinct Inattention	(0) No abnormality   · NIH Stroke Scale: Total	0      (28 Apr 2024 22:41)      PAST MEDICAL & SURGICAL HISTORY:  HTN (hypertension)      HLD (hyperlipidemia)           FAMILY HISTORY:  NC    Social History:Non smoker    MEDICATIONS  (STANDING):  anastrozole 1 milliGRAM(s) Oral daily  heparin   Injectable 5000 Unit(s) SubCutaneous every 8 hours  hydrALAZINE 25 milliGRAM(s) Oral three times a day  influenza  Vaccine (HIGH DOSE) 0.7 milliLiter(s) IntraMuscular once  latanoprost 0.005% Ophthalmic Solution 1 Drop(s) Both EYES at bedtime  levothyroxine 75 MICROGram(s) Oral daily  metoprolol tartrate 75 milliGRAM(s) Oral every 8 hours  sodium chloride 2 Gram(s) Oral two times a day  valsartan 80 milliGRAM(s) Oral <User Schedule>    MEDICATIONS  (PRN):  acetaminophen     Tablet .. 650 milliGRAM(s) Oral every 6 hours PRN Mild Pain (1 - 3)  hydrALAZINE Injectable 10 milliGRAM(s) IV Push every 2 hours PRN SBP> 150 and DBP> 90  labetalol Injectable 10 milliGRAM(s) IV Push every 2 hours PRN SBP> 150 and DBP> 90  senna 2 Tablet(s) Oral at bedtime PRN Constipation   Meds reviewed    Allergies    No Known Allergies        REVIEW OF SYSTEMS:    CONSTITUTIONAL:  Feels weak  EYES: No eye pain, visual disturbances, or discharge  ENMT:  No difficulty hearing, tinnitus, vertigo; No sinus or throat pain  NECK: No pain or stiffness  BREASTS: No pain, masses, or nipple discharge  RESPIRATORY: neg  CARDIOVASCULAR: No chest pain, palpitations, dizziness,   GASTROINTESTINAL: No abdominal or epigastric pain. No nausea, vomiting, or hematemesis; No diarrhea or constipation.  GENITOURINARY: No dysuria, frequency, hematuria, or incontinence  NEUROLOGICAL: + headaches,   SKIN: neg  LYMPH NODES: No enlarged glands  ENDOCRINE: No heat or cold intolerance; No hair loss  MUSCULOSKELETAL: OA  PSYCHIATRIC: No depression, anxiety, mood swings, or difficulty sleeping  HEME/LYMPH: No easy bruising, or bleeding gums  ALLERY AND IMMUNOLOGIC: No hives or eczema      Vital Signs Last 24 Hrs  T(C): 36.9 (03 May 2024 07:22), Max: 37.2 (02 May 2024 20:18)  T(F): 98.5 (03 May 2024 07:22), Max: 99 (03 May 2024 00:36)  HR: 122 (03 May 2024 15:00) (87 - 124)  BP: 155/136 (03 May 2024 15:00) (127/107 - 176/98)  BP(mean): 141 (03 May 2024 15:00) (93 - 146)  RR: 20 (03 May 2024 15:00) (16 - 20)  SpO2: 96% (03 May 2024 15:00) (95% - 100%)    Parameters below as of 03 May 2024 14:00  Patient On (Oxygen Delivery Method): room air           PHYSICAL EXAM:    GENERAL: appears chronically ill, oriented.  HEAD:  Atraumatic, Normocephalic  EYES: EOMI, PERRLA, conjunctiva and sclera clear  ENMT: No tonsillar erythema, exudates, or enlargement; Moist mucous membranes, Good dentition, No lesions  NECK: Supple, neck  veins not distnded  NERVOUS SYSTEM:  Alert & Oriented X3, Good concentration; Motor Strength wnl upper and lower extremities;  CHEST/LUNG: Clear to percussion bilaterally; No rales, rhonchi, wheezing, or rubs  HEART: tachy. with IRR, monitor noted  ABDOMEN: Soft, Nontender, Nondistended; Bowel sounds present  EXTREMITIES: trace leg  edema  LYMPH: No lymphadenopathy noted  SKIN: No rashes or lesions, pale  < Neg    LABS:                        13.2   14.01 )-----------( 342      ( 03 May 2024 04:41 )             37.8     05-03    129<L>  |  97  |  24.6<H>  ----------------------------<  113<H>  5.0   |  20.0<L>  |  0.45<L>    Ca    8.5      03 May 2024 09:21  Phos  1.9     05-03  Mg     2.6     05-03    TPro  6.0<L>  /  Alb  3.5  /  TBili  1.3  /  DBili  x   /  AST  16  /  ALT  25  /  AlkPhos  68  05-02      Urinalysis Basic - ( 03 May 2024 09:21 )    Color: x / Appearance: x / SG: x / pH: x  Gluc: 113 mg/dL / Ketone: x  / Bili: x / Urobili: x   Blood: x / Protein: x / Nitrite: x   Leuk Esterase: x / RBC: x / WBC x   Sq Epi: x / Non Sq Epi: x / Bacteria: x      Magnesium: 2.6 mg/dL (05-03 @ 09:21)  Magnesium: 1.3 mg/dL (05-03 @ 04:33)  Phosphorus: 1.9 mg/dL (05-03 @ 04:33)          RADIOLOGY & ADDITIONAL TESTS:

## 2024-05-03 NOTE — PROGRESS NOTE ADULT - ASSESSMENT
ASSESSMENT:  84 y/o female with PMHx HTN and A Fib (reportedly only started taking Eliquis x 1 week prior to admission, last dose 4/28 AM), hx of Breast CA? on Anastrazole, who presented to [place] ED complaining of headache on 4/28/24. Patient is a poor historian so hx obtained from chart. Per notes from [place], EMS reported SBP > 200 upon arrival to ED. Upon arrival to [place], CTH was performed and showed an intraventricular hemorrhage. Patient was reversed w/ Andexxa and started on cardene gtt, transferred to [facility] for further workup and care.    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 Patient has a hx of noncompliance to meds.    5/2: Na continuing to downtrend despite 1g sodium chloride tablets PO ordered daily starting 5/1, increased sodium tablets to TID, 1000mL fluid restriction ordered.  5/3: Overnight, patient with lethargy, found to have worsening hyponatremia to 125 @ 1920, despite interventions. Patient initiated on 2% Hypertonic Sodium Chloride @ 30 cc's/Hr Stat per Dr. Quintana overnight, repeat BMPs Q 6 hrs, CT Head ordered. PO salt tabs d/c'd. Repeat Na 132 @ 0430. 2% Sodium Chloride d/c'd as concern Na was rising too quickly. Repeat Na 129 @ 0930. PO salt tabs reinitiated. Pending nephrology c/s. CT Head was stable.     Patient lethargy likely due to metabolic encephalopathy secondary to hyponatremia.    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  -Neurologically patient with ongoing improvement  -Continue close monitoring for neurological deterioration  -Stroke neuro checks q 2 hours  -Pending MR Head w/wo IV Cont  -Permissive HTN, SBP Goal 130-150 mmHg, maintain BP under 150/90 mmHg  -ANTITHROMBOTIC THERAPY: Not indicated at this time given IVH, hold all AP/AC, eventually will need to be restarted on at minimum antiplatelets, but would hold of at least 14 days.  Anticoagulation only to be restarted if BP remains stable and if she can demonstrate compliance. Per cardio note: CHADSVasc 4, but currently with acute cerebral bleeding and with recent fall injury/trauma not ideal candidate for long term AC use, consider elective outpatient LAAO   -CTH 5/1 demonstrating stable exam  -STATIN THERAPY: Not indicated at this time given IVH, LDL 60  -LDL 60   -STAT head CT for any neurologic change, especially given patient has hydrocephalus  -DYSPHAGIA: PASS   -Will need serial imaging to monitor hydrocephalus  -Head of bed at 30 degrees   -PT/OT/Speech eval and treatment   -Patient refusing rehab per case management, states she wants to go home --> Pending OT cognitive eval     CARDIAC:  #Atrial fibrillation #Hypertensive emergency #HTN  -VS q 2 hours  -TTE as noted: EF 55-60%  -Cardiology c/s for recommendations in management   -Valsartan increased to 80mg TID per cardio today given ongoing HTN   -Continue cardiac monitoring pending findings of further workup    PULM:  -Patient tolerating room air, saturating well  -Continue to maintain spO2 > 94%    HEME:  -H/H 12.9/36.9   -Platelets 356  -Continue to monitor, CBC q daily  -US Duplex Bilateral Lower Extremity Venous negative for evidence of DVT  -DVT ppx: Heparin subcutaneous q 8h    GI:  -Abdominal exam benign, patient with no complaints  -Diet: Regular, 1000mL fluid restriction  -Bowel regimen: Senna PO at bedtime PRN constipation    RENAL/METABOLIC:  #Hypophosphatemia, resolved #Hyponatremia   -BUN/Cr 21.0/0.44, GFR 95  -Patient voiding well w/o difficulty, continue I&O's  -Persistent hyponatremia, Na 127   -Na continuing to downtrend despite 1g sodium chloride tablets PO ordered daily starting 5/1 --> increased sodium tablets to 2g BID, 1000mL fluid restriction ordered  -Pending urine sodium   -Pending urine osmolality   -BMP q 6 hours  - Nephro c/s  -All other electrolytes WNL, replete PRN    ENDOCRINE:  -A1C 5.3  -TSH 1.24    ID:  -Leukocytosis, WBC 16.15  -Pending UA+micro, urine cx   -Afebrile, no indication of infection/sepsis  -Continue to monitor for signs/symptoms of infection    OTHER:   -Condition discussed w/ patient and family at bedside.  -Patient's brother is primary surrogate for patient, Umberto Butler [phone]    DISPO: Pending clinical course      CORE MEASURES:        Admission NIHSS: 0     Tenecteplase : [] YES [x] NO      LDL/A1C: 60/5.3     Depression Screen- if depression hx and/or present      Statin Therapy: Not indicated at this time, IVH     Dysphagia Screen: [x] PASS [] FAIL     Smoking [] YES [x] NO      Afib [x] YES [] NO     Stroke Education [x] YES [] NO ASSESSMENT:  84 y/o female with PMHx HTN and A Fib (reportedly only started taking Eliquis x 1 week prior to admission, last dose 4/28 AM), hx of Breast CA? on Anastrazole, who presented to [place] ED complaining of headache on 4/28/24. Patient is a poor historian so hx obtained from chart. Per notes from [place], EMS reported SBP > 200 upon arrival to ED. Upon arrival to [place], CTH was performed and showed an intraventricular hemorrhage. Patient was reversed w/ Andexxa and started on cardene gtt, transferred to [facility] for further workup and care.    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 Patient has a hx of noncompliance to meds.    5/2: Na continuing to downtrend despite 1g sodium chloride tablets PO ordered daily starting 5/1, increased sodium tablets to TID, 1000mL fluid restriction ordered.  5/3: Overnight, patient with lethargy, found to have worsening hyponatremia to 125 @ 1920, despite interventions. Patient initiated on 2% Hypertonic Sodium Chloride @ 30 cc's/Hr Stat per Dr. Quintana overnight, repeat BMPs Q 6 hrs, CT Head ordered. PO salt tabs d/c'd. Repeat Na 132 @ 0430. 2% Sodium Chloride d/c'd as concern Na was rising too quickly. Repeat Na 129 @ 0930. PO salt tabs reinitiated. Pending nephrology c/s. CT Head was stable. Additionally overnight patient had Potassium 2.8, Calcium 5.8, and Magnesium 1.3 on AM labs    Patient lethargy likely due to metabolic encephalopathy secondary to hyponatremia.    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  Anticoagulation only to be restarted if BP remains stable and if she can demonstrate compliance. Per cardio note: CHADSVasc 4, but currently with acute cerebral bleeding and with recent fall injury/trauma not ideal candidate for long term AC use, consider elective outpatient LAAO   -CTH 5/3 demonstrating stable exam  -STATIN THERAPY: Not indicated at this time given IVH, LDL 60  -LDL 60   -STAT head CT for any neurologic change, especially given patient has hydrocephalus  -DYSPHAGIA: PASS   -Will need serial imaging to monitor hydrocephalus  -Head of bed at 30 degrees   -PT/OT/Speech eval and treatment   -Patient refusing rehab per case management, states she wants to go home --> Pending OT cognitive eval     CARDIAC:  #Atrial fibrillation #Hypertensive emergency #HTN  -VS q 2 hours  -TTE as noted: EF 55-60%  -Cardiology c/s for recommendations in management   -Valsartan increased to 80mg TID per cardio 5/2 given ongoing HTN   -IVP PRN antihypertensives for sustained SBP >150  -Continue cardiac monitoring pending findings of further workup    PULM:  -Patient tolerating room air, saturating well  -Continue to maintain spO2 > 94%    HEME:  -H/H 13.2/37.8   -Platelets 342  -Continue to monitor, CBC q daily  -US Duplex Bilateral Lower Extremity Venous negative for evidence of DVT  -DVT ppx: Heparin subcutaneous q 8h    GI:  -Abdominal exam benign, patient with no complaints  -Diet: Regular, 1000mL fluid restriction  -Bowel regimen: Senna PO at bedtime PRN constipation    RENAL/METABOLIC:  #Urinary retention #Hyponatremia #Hypophosphatemia #Hypocalcemia #Hypomagnesemia   #SIADH vs Cerebral Salt Wasting   -BUN/Cr 24.6/0.45, GFR 94  -Patient required several straight caths yesterday 5/2, now voiding well w/o difficulty, continue I&O's  -Potassium 2.8, repleted w/ 10mEq rider x 3 and PO potassium chloride 40mEq x 1 --> K 5.0  -Calcium 5.8, repleted w/ 1g calcium gluconate IVPB x 1 --> Ca 8.5  -Magnesium 1.3, repleted w/ 2g magnesium sulfate IVPB x 1 --> Mag 2.6  -Phosphorus 1.9, repleted w/ PO PhosNaK x 2 --> pending repeat level    -Persistent hyponatremia, Na 129   -Na continuing to downtrend despite 1g sodium chloride tablets PO ordered daily starting 5/1 --> increased sodium tablets to 2g BID, 1000mL fluid restriction ordered  -s/p 2% sodium chloride @ 30mL/hour due to Na 125 overnight, d/c'd after AM Na 132 due to concern of rising too quickly   -Concern for SIADH vs cerebral salt wasting  -Pending nephro c/s  -Urine sodium 105  -Urine osmolality 471  -Serum osmolality 277  -BMP q 6 hours  -Electrolytes s/p repletion WNL, replete PRN    ENDOCRINE:  -A1C 5.3  -TSH 1.24    ID:  #Leukocytosis  -WBC downtrending 16.15 --> 14.01  -Urinalysis without acute findings   -Afebrile, no indication of infection/sepsis  -Continue to monitor for signs/symptoms of infection    OTHER:   -Condition discussed w/ patient and family at bedside.  -Patient's brother is primary surrogate for patient, Umberto Butler [phone]    DISPO: Pending clinical course      CORE MEASURES:        Admission NIHSS: 0     Tenecteplase : [] YES [x] NO      LDL/A1C: 60/5.3     Depression Screen- if depression hx and/or present      Statin Therapy: Not indicated at this time, IVH     Dysphagia Screen: [x] PASS [] FAIL     Smoking [] YES [x] NO      Afib [x] YES [] NO     Stroke Education [x] YES [] NO

## 2024-05-03 NOTE — CHART NOTE - NSCHARTNOTEFT_GEN_A_CORE
Repeat BMP f/u. Serum Na 126, no change from previous. Nephrology onboard, started pt on PO sodium bicarbonate three times daily. Pt received first PO dose sodium bicarbonate this evening, shortly after BMP was drawn. Will administer additional PO dose sodium bicarbonate tonight X1 dose. Trend BMP Q6H, repeat at 03:00AM will f/u. RN instructed to continue to monitor pt and notify provider of any changes in pt status. Primary team will be notified in AM. Repeat BMP f/u. Serum Na 126, no change from previous. Nephrology onboard, started pt on PO sodium bicarbonate three times daily. Pt received first PO dose sodium bicarbonate this evening, shortly after BMP was drawn. Will administer additional PO dose sodium bicarbonate tonight X1 dose. Trend BMP Q6H, repeat at 03:00AM will f/u. RN instructed to continue to monitor pt and notify provider of any changes in pt status. Primary team will be notified in AM.  Noted U/A result. RN reports pt incontinent and urine collected from [facility]. Ordered repeat U/A, straight cath for urine sample.    05/04/2024 03:00AM repeat U/A positive. U/C ordered, RN instructed to collect prior to abx administration. Ordered Rocephin 1 gram IVP Q24H X3 days, first dose STAT. Primary team to f/u in AM. Repeat BMP f/u. Serum Na 126, no change from previous. Nephrology onboard, started pt on PO sodium bicarbonate three times daily. Pt received first PO dose sodium bicarbonate this evening, shortly after BMP was drawn. Will administer additional PO dose sodium bicarbonate tonight X1 dose. Trend BMP Q6H, repeat at 03:00AM will f/u. RN instructed to continue to monitor pt and notify provider of any changes in pt status. Primary team will be notified in AM.  Noted U/A result. RN reports pt incontinent and urine collected from [facility]. Ordered repeat U/A, straight cath for urine sample.    05/04/2024 03:00AM repeat U/A positive. U/C ordered, RN instructed to collect prior to abx administration. Ordered Rocephin 1 gram IVP Q24H X3 days, first dose STAT. Primary team to f/u in AM.    05/04/2024 03:45AM Serum sodium 126. Called Nephrology, per answering service Dr. True paul, awaiting callback.     05/04/2024 04:50AM Called Dr. Quintana, updated on BMP results, no new orders at this time. To continue per Nephrology PO supplement recommendations and trend BMP Q6H. Repeat BMP f/u. Serum Na 126, no change from previous. Nephrology onboard, started pt on PO sodium bicarbonate three times daily, urea BID and fluid restrictions changed to 800mL/day. Pt received first PO doses of urea and sodium bicarbonate this evening, shortly after BMP was drawn. Will administer additional PO dose sodium bicarbonate tonight X1 dose. Trend BMP Q6H, repeat at 03:00AM will f/u. RN instructed to continue to monitor pt and notify provider of any changes in pt status. Primary team will be notified in AM.  Noted U/A result. RN reports pt incontinent and urine collected from [facility]. Ordered repeat U/A, straight cath for urine sample.    05/04/2024 03:00AM repeat U/A positive. U/C ordered, RN instructed to collect prior to abx administration. Ordered Rocephin 1 gram IVP Q24H X3 days, first dose STAT. Primary team to f/u in AM.    05/04/2024 03:45AM Serum sodium 126. No change in pt mentation. Called Nephrology, per answering service Dr. True paul, awaiting callback.     05/04/2024 04:50AM Called Dr. Quintana, updated on BMP results, no new orders at this time. To continue per Nephrology PO supplement recommendations and trend BMP Q6H.

## 2024-05-04 DIAGNOSIS — I16.0 HYPERTENSIVE URGENCY: ICD-10-CM

## 2024-05-04 LAB
ANION GAP SERPL CALC-SCNC: 11 MMOL/L — SIGNIFICANT CHANGE UP (ref 5–17)
ANION GAP SERPL CALC-SCNC: 14 MMOL/L — SIGNIFICANT CHANGE UP (ref 5–17)
APPEARANCE UR: ABNORMAL
BACTERIA # UR AUTO: ABNORMAL /HPF
BILIRUB UR-MCNC: NEGATIVE — SIGNIFICANT CHANGE UP
BUN SERPL-MCNC: 31.9 MG/DL — HIGH (ref 8–20)
BUN SERPL-MCNC: 37 MG/DL — HIGH (ref 8–20)
CALCIUM SERPL-MCNC: 8.2 MG/DL — LOW (ref 8.4–10.5)
CALCIUM SERPL-MCNC: 9 MG/DL — SIGNIFICANT CHANGE UP (ref 8.4–10.5)
CAST: 3 /LPF — SIGNIFICANT CHANGE UP (ref 0–4)
CHLORIDE SERPL-SCNC: 87 MMOL/L — LOW (ref 96–108)
CHLORIDE SERPL-SCNC: 91 MMOL/L — LOW (ref 96–108)
CO2 SERPL-SCNC: 24 MMOL/L — SIGNIFICANT CHANGE UP (ref 22–29)
CO2 SERPL-SCNC: 25 MMOL/L — SIGNIFICANT CHANGE UP (ref 22–29)
COLOR SPEC: YELLOW — SIGNIFICANT CHANGE UP
CREAT SERPL-MCNC: 0.38 MG/DL — LOW (ref 0.5–1.3)
CREAT SERPL-MCNC: 0.59 MG/DL — SIGNIFICANT CHANGE UP (ref 0.5–1.3)
DIFF PNL FLD: ABNORMAL
EGFR: 88 ML/MIN/1.73M2 — SIGNIFICANT CHANGE UP
EGFR: 98 ML/MIN/1.73M2 — SIGNIFICANT CHANGE UP
GLUCOSE SERPL-MCNC: 103 MG/DL — HIGH (ref 70–99)
GLUCOSE SERPL-MCNC: 145 MG/DL — HIGH (ref 70–99)
GLUCOSE UR QL: NEGATIVE MG/DL — SIGNIFICANT CHANGE UP
HCT VFR BLD CALC: 37 % — SIGNIFICANT CHANGE UP (ref 34.5–45)
HGB BLD-MCNC: 13.1 G/DL — SIGNIFICANT CHANGE UP (ref 11.5–15.5)
KETONES UR-MCNC: ABNORMAL MG/DL
LACTATE SERPL-SCNC: 1.4 MMOL/L — SIGNIFICANT CHANGE UP (ref 0.5–2)
LACTATE SERPL-SCNC: 2.8 MMOL/L — HIGH (ref 0.5–2)
LACTATE SERPL-SCNC: 3.1 MMOL/L — HIGH (ref 0.5–2)
LEUKOCYTE ESTERASE UR-ACNC: ABNORMAL
MAGNESIUM SERPL-MCNC: 1.7 MG/DL — SIGNIFICANT CHANGE UP (ref 1.6–2.6)
MCHC RBC-ENTMCNC: 30.1 PG — SIGNIFICANT CHANGE UP (ref 27–34)
MCHC RBC-ENTMCNC: 35.4 GM/DL — SIGNIFICANT CHANGE UP (ref 32–36)
MCV RBC AUTO: 85.1 FL — SIGNIFICANT CHANGE UP (ref 80–100)
NITRITE UR-MCNC: POSITIVE
PH UR: 7 — SIGNIFICANT CHANGE UP (ref 5–8)
PHOSPHATE SERPL-MCNC: 2.8 MG/DL — SIGNIFICANT CHANGE UP (ref 2.4–4.7)
PLATELET # BLD AUTO: 308 K/UL — SIGNIFICANT CHANGE UP (ref 150–400)
POTASSIUM SERPL-MCNC: 3.7 MMOL/L — SIGNIFICANT CHANGE UP (ref 3.5–5.3)
POTASSIUM SERPL-MCNC: 3.8 MMOL/L — SIGNIFICANT CHANGE UP (ref 3.5–5.3)
POTASSIUM SERPL-SCNC: 3.7 MMOL/L — SIGNIFICANT CHANGE UP (ref 3.5–5.3)
POTASSIUM SERPL-SCNC: 3.8 MMOL/L — SIGNIFICANT CHANGE UP (ref 3.5–5.3)
PROT UR-MCNC: 30 MG/DL
RBC # BLD: 4.35 M/UL — SIGNIFICANT CHANGE UP (ref 3.8–5.2)
RBC # FLD: 13.7 % — SIGNIFICANT CHANGE UP (ref 10.3–14.5)
RBC CASTS # UR COMP ASSIST: 18 /HPF — HIGH (ref 0–4)
SODIUM SERPL-SCNC: 126 MMOL/L — LOW (ref 135–145)
SODIUM SERPL-SCNC: 126 MMOL/L — LOW (ref 135–145)
SP GR SPEC: 1.02 — SIGNIFICANT CHANGE UP (ref 1–1.03)
SQUAMOUS # UR AUTO: 0 /HPF — SIGNIFICANT CHANGE UP (ref 0–5)
URATE SERPL-MCNC: 1.8 MG/DL — LOW (ref 2.4–5.7)
UROBILINOGEN FLD QL: 1 MG/DL — SIGNIFICANT CHANGE UP (ref 0.2–1)
WBC # BLD: 25.36 K/UL — HIGH (ref 3.8–10.5)
WBC # FLD AUTO: 25.36 K/UL — HIGH (ref 3.8–10.5)
WBC UR QL: >998 /HPF — HIGH (ref 0–5)

## 2024-05-04 PROCEDURE — 71045 X-RAY EXAM CHEST 1 VIEW: CPT | Mod: 26

## 2024-05-04 PROCEDURE — 99233 SBSQ HOSP IP/OBS HIGH 50: CPT

## 2024-05-04 PROCEDURE — 99233 SBSQ HOSP IP/OBS HIGH 50: CPT | Mod: FS

## 2024-05-04 RX ORDER — CEFTRIAXONE 500 MG/1
INJECTION, POWDER, FOR SOLUTION INTRAMUSCULAR; INTRAVENOUS
Refills: 0 | Status: DISCONTINUED | OUTPATIENT
Start: 2024-05-04 | End: 2024-05-05

## 2024-05-04 RX ORDER — SODIUM CHLORIDE 9 MG/ML
1000 INJECTION INTRAMUSCULAR; INTRAVENOUS; SUBCUTANEOUS
Refills: 0 | Status: DISCONTINUED | OUTPATIENT
Start: 2024-05-04 | End: 2024-05-06

## 2024-05-04 RX ORDER — PANTOPRAZOLE SODIUM 20 MG/1
40 TABLET, DELAYED RELEASE ORAL
Refills: 0 | Status: DISCONTINUED | OUTPATIENT
Start: 2024-05-04 | End: 2024-05-13

## 2024-05-04 RX ORDER — SODIUM CHLORIDE 9 MG/ML
850 INJECTION INTRAMUSCULAR; INTRAVENOUS; SUBCUTANEOUS ONCE
Refills: 0 | Status: COMPLETED | OUTPATIENT
Start: 2024-05-04 | End: 2024-05-04

## 2024-05-04 RX ORDER — PIPERACILLIN AND TAZOBACTAM 4; .5 G/20ML; G/20ML
3.38 INJECTION, POWDER, LYOPHILIZED, FOR SOLUTION INTRAVENOUS ONCE
Refills: 0 | Status: COMPLETED | OUTPATIENT
Start: 2024-05-04 | End: 2024-05-04

## 2024-05-04 RX ORDER — CEFTRIAXONE 500 MG/1
1000 INJECTION, POWDER, FOR SOLUTION INTRAMUSCULAR; INTRAVENOUS EVERY 24 HOURS
Refills: 0 | Status: DISCONTINUED | OUTPATIENT
Start: 2024-05-05 | End: 2024-05-05

## 2024-05-04 RX ORDER — POTASSIUM CHLORIDE 20 MEQ
40 PACKET (EA) ORAL ONCE
Refills: 0 | Status: COMPLETED | OUTPATIENT
Start: 2024-05-04 | End: 2024-05-04

## 2024-05-04 RX ORDER — PIPERACILLIN AND TAZOBACTAM 4; .5 G/20ML; G/20ML
3.38 INJECTION, POWDER, LYOPHILIZED, FOR SOLUTION INTRAVENOUS EVERY 8 HOURS
Refills: 0 | Status: DISCONTINUED | OUTPATIENT
Start: 2024-05-05 | End: 2024-05-07

## 2024-05-04 RX ORDER — MAGNESIUM SULFATE 500 MG/ML
1 VIAL (ML) INJECTION ONCE
Refills: 0 | Status: COMPLETED | OUTPATIENT
Start: 2024-05-04 | End: 2024-05-04

## 2024-05-04 RX ORDER — SODIUM CHLORIDE 9 MG/ML
1000 INJECTION INTRAMUSCULAR; INTRAVENOUS; SUBCUTANEOUS ONCE
Refills: 0 | Status: COMPLETED | OUTPATIENT
Start: 2024-05-04 | End: 2024-05-04

## 2024-05-04 RX ORDER — MAGNESIUM SULFATE 500 MG/ML
2 VIAL (ML) INJECTION ONCE
Refills: 0 | Status: COMPLETED | OUTPATIENT
Start: 2024-05-04 | End: 2024-05-04

## 2024-05-04 RX ORDER — METOPROLOL TARTRATE 50 MG
5 TABLET ORAL ONCE
Refills: 0 | Status: COMPLETED | OUTPATIENT
Start: 2024-05-04 | End: 2024-05-04

## 2024-05-04 RX ORDER — CEFTRIAXONE 500 MG/1
1000 INJECTION, POWDER, FOR SOLUTION INTRAMUSCULAR; INTRAVENOUS ONCE
Refills: 0 | Status: COMPLETED | OUTPATIENT
Start: 2024-05-04 | End: 2024-05-04

## 2024-05-04 RX ADMIN — PIPERACILLIN AND TAZOBACTAM 25 GRAM(S): 4; .5 INJECTION, POWDER, LYOPHILIZED, FOR SOLUTION INTRAVENOUS at 21:12

## 2024-05-04 RX ADMIN — Medication 25 MILLIGRAM(S): at 05:17

## 2024-05-04 RX ADMIN — VALSARTAN 80 MILLIGRAM(S): 80 TABLET ORAL at 13:44

## 2024-05-04 RX ADMIN — Medication 40 MILLIEQUIVALENT(S): at 12:10

## 2024-05-04 RX ADMIN — VALSARTAN 80 MILLIGRAM(S): 80 TABLET ORAL at 21:13

## 2024-05-04 RX ADMIN — Medication 25 MILLIGRAM(S): at 21:13

## 2024-05-04 RX ADMIN — Medication 1300 MILLIGRAM(S): at 05:17

## 2024-05-04 RX ADMIN — LATANOPROST 1 DROP(S): 0.05 SOLUTION/ DROPS OPHTHALMIC; TOPICAL at 22:07

## 2024-05-04 RX ADMIN — Medication 75 MILLIGRAM(S): at 05:17

## 2024-05-04 RX ADMIN — Medication 75 MILLIGRAM(S): at 21:13

## 2024-05-04 RX ADMIN — Medication 25 GRAM(S): at 09:45

## 2024-05-04 RX ADMIN — Medication 75 MILLIGRAM(S): at 13:43

## 2024-05-04 RX ADMIN — SODIUM CHLORIDE 1000 MILLILITER(S): 9 INJECTION INTRAMUSCULAR; INTRAVENOUS; SUBCUTANEOUS at 15:48

## 2024-05-04 RX ADMIN — Medication 1300 MILLIGRAM(S): at 13:43

## 2024-05-04 RX ADMIN — CEFTRIAXONE 1000 MILLIGRAM(S): 500 INJECTION, POWDER, FOR SOLUTION INTRAMUSCULAR; INTRAVENOUS at 03:30

## 2024-05-04 RX ADMIN — SODIUM CHLORIDE 850 MILLILITER(S): 9 INJECTION INTRAMUSCULAR; INTRAVENOUS; SUBCUTANEOUS at 16:23

## 2024-05-04 RX ADMIN — SODIUM CHLORIDE 100 MILLILITER(S): 9 INJECTION INTRAMUSCULAR; INTRAVENOUS; SUBCUTANEOUS at 19:52

## 2024-05-04 RX ADMIN — Medication 10 MILLIGRAM(S): at 00:19

## 2024-05-04 RX ADMIN — VALSARTAN 80 MILLIGRAM(S): 80 TABLET ORAL at 05:17

## 2024-05-04 RX ADMIN — HEPARIN SODIUM 5000 UNIT(S): 5000 INJECTION INTRAVENOUS; SUBCUTANEOUS at 05:18

## 2024-05-04 RX ADMIN — Medication 25 MILLIGRAM(S): at 13:43

## 2024-05-04 RX ADMIN — Medication 15 GRAM(S): at 17:48

## 2024-05-04 RX ADMIN — Medication 1300 MILLIGRAM(S): at 00:14

## 2024-05-04 RX ADMIN — HEPARIN SODIUM 5000 UNIT(S): 5000 INJECTION INTRAVENOUS; SUBCUTANEOUS at 13:42

## 2024-05-04 RX ADMIN — PIPERACILLIN AND TAZOBACTAM 200 GRAM(S): 4; .5 INJECTION, POWDER, LYOPHILIZED, FOR SOLUTION INTRAVENOUS at 16:28

## 2024-05-04 RX ADMIN — ANASTROZOLE 1 MILLIGRAM(S): 1 TABLET ORAL at 12:10

## 2024-05-04 RX ADMIN — Medication 5 MILLIGRAM(S): at 20:21

## 2024-05-04 RX ADMIN — Medication 15 GRAM(S): at 05:18

## 2024-05-04 RX ADMIN — Medication 75 MICROGRAM(S): at 05:17

## 2024-05-04 RX ADMIN — HEPARIN SODIUM 5000 UNIT(S): 5000 INJECTION INTRAVENOUS; SUBCUTANEOUS at 21:13

## 2024-05-04 NOTE — PROGRESS NOTE ADULT - ASSESSMENT
82 y/o female with PMHx HTN, Breast Ca (dx 2020, surgery, no chemo/radiation) and A Fib (started taking  Eliquisx1 week ago, last dose 4/28 AM) BIBA to [facility]  from home due to headache. Patient had recently been admitted due to HTN after running out of her coreg. CT head found with intraventricular bleed and was transferred to [facility] ED for further evaluation. She was given Andexxa for Eliquis reversal and BP was controlled with nicardipine for SBP > 200s. She recently lost her  and has not been compliant with medications. She is now weaned off nicardipine. She denies chest pain, back pain, SOB, LOVELACE, diaphoresis, syncope or N/V. Of note patient endorses that she had fallen and hit her head a couple of days before initiation of Eliquis but did not communicate to team at that time.

## 2024-05-04 NOTE — PROGRESS NOTE ADULT - ASSESSMENT
A) In light of  today's events pt appears  to have possible   related  sepsis, CNS bleed with not IADH but has CNS  salt  losing hyponatremia.    P) IV  antibiotics, iv  saline , prior fluid orders and Urea stopped. labs in  am.

## 2024-05-04 NOTE — PROGRESS NOTE ADULT - SUBJECTIVE AND OBJECTIVE BOX
Preliminary note, offical recommendations pending attending review/signature   [facility] Stroke Team  Progress Note     HPI:  84 y/o female with PMHx HTN and A Fib (reportedly only started taking Eliquis x 1 week prior to admission, last dose 4/28 AM), hx of Breast CA? on Anastrazole, who presented to [place] ED complaining of headache on 4/28/24. Patient is a poor historian so hx obtained from chart. Per notes from [place], EMS reported SBP > 200 upon arrival to ED. Upon arrival to [place], CTH was performed and showed an intraventricular hemorrhage. Patient was reversed w/ Andexxa and started on cardene gtt, transferred to [facility] for further workup and care. Patient reportedly lost her  in January and had been taking care of him, and has been having difficulty taking care of herself since his passing. ICH 3 on admission. Patient admitted to Neuro ICU for further monitoring and care.     SUBJECTIVE: Overnight, patient started on sodium bicarb and urea BID, placed on 800mL/day fluid restriction per nephrology recommendations; additional sodium bicarb dose given for persistent hyponatremia. UA repeated w/ straight cath'd sample, concern for UTI, started on Rocephin. Also with episode of rapid afib with HR into the 140s, requiring IV lopressor.  No new neurologic complaints.  ROS reported negative unless otherwise noted.    acetaminophen     Tablet .. 650 milliGRAM(s) Oral every 6 hours PRN  anastrozole 1 milliGRAM(s) Oral daily  cefTRIAXone Injectable.      heparin   Injectable 5000 Unit(s) SubCutaneous every 8 hours  hydrALAZINE 25 milliGRAM(s) Oral three times a day  hydrALAZINE Injectable 10 milliGRAM(s) IV Push every 2 hours PRN  influenza  Vaccine (HIGH DOSE) 0.7 milliLiter(s) IntraMuscular once  labetalol Injectable 10 milliGRAM(s) IV Push every 2 hours PRN  latanoprost 0.005% Ophthalmic Solution 1 Drop(s) Both EYES at bedtime  levothyroxine 75 MICROGram(s) Oral daily  metoprolol tartrate 75 milliGRAM(s) Oral every 8 hours  senna 2 Tablet(s) Oral at bedtime PRN  sodium bicarbonate 1300 milliGRAM(s) Oral three times a day  urea Oral Powder 15 Gram(s) Oral two times a day  valsartan 80 milliGRAM(s) Oral <User Schedule>      PHYSICAL EXAM:   Vital Signs Last 24 Hrs  T(C): 37.2 (04 May 2024 07:26), Max: 37.2 (04 May 2024 07:26)  T(F): 99 (04 May 2024 07:26), Max: 99 (04 May 2024 07:26)  HR: 109 (04 May 2024 08:00) (102 - 128)  BP: 147/73 (04 May 2024 06:00) (142/90 - 165/135)  BP(mean): 93 (04 May 2024 06:00) (93 - 146)  RR: 16 (04 May 2024 06:00) (16 - 20)  SpO2: 95% (04 May 2024 06:00) (93% - 98%)    Parameters below as of 04 May 2024 06:00  Patient On (Oxygen Delivery Method): nasal cannula  O2 Flow (L/min): 2    COMPLETE A/P PENDING     General: No acute distress.   HEENT: Head normocephalic, atraumatic. Conjunctivae clear w/o exudates or hemorrhage. Sclera non-icteric. Nares are patent bilaterally.  Neck: Supple, no adenopathy. Trachea midline.   Cardiac: Normal rate and rhythm. S1, S2 auscultated. No murmurs, gallops, or rubs.     Respiratory: Lung sounds clear in all fields. Chest wall symmetric, nontender.   Abdominal: Soft, nondistended, nontender. Bowel sounds normoactive x 4 quadrants.   Skin: Skin is warm, dry and intact without rashes or lesions. Appropriate color for ethnicity.   Extremities: No edema, . Full range of motion in all joints.     NEUROLOGICAL EXAM:  Mental status: Patient appears more somnolent today, awakens easily. Upon awakening, patient is alert, oriented x 2, oriented to self, place, disoriented to year and month. Patient requires repetitive prompting to stay awake during exam. Speech fluent, follows commands, no neglect, short-term memory appears to be impaired, patient does not recall why she is in the hospital, states "it's because I have problems"    Cranial Nerves: No facial asymmetry, no nystagmus, no dysarthria,  tongue midline  Motor exam: Normal tone, no drift, 5/5 RUE, 5/5 RLE, 5/5 LUE, 5/5 LLE, normal fine finger movements.  Sensation: Intact to light touch   Coordination: no dysmetria  Gait: deferred      LABS:                        13.1   25.36 )-----------( 308      ( 04 May 2024 02:25 )             37.0    05-04    126<L>  |  91<L>  |  31.9<H>  ----------------------------<  103<H>  3.7   |  24.0  |  0.38<L>    Ca    8.2<L>      04 May 2024 02:25  Phos  2.8     05-04  Mg     1.7     05-04 04-29 Chol 151 LDL=60 HDL 79 Trig 62, 03-23 Chol 145 LDL -- HDL 71 Trig 41    A1C: 5.3    NEUROIMAGING    CT Head No Cont (05.03.24 @ 02:57)  IMPRESSION:  Left caudate nucleus intraparenchymal hemorrhage with intraventricular   extension, grossly stable overall. Mild hydrocephalus is grossly stable.    CT Head No Cont (05.01.24 @ 08:30)   IMPRESSION: Stable exam.    CT Head No Cont (04.29.24 @ 20:33)   IMPRESSION: Stable acute small left caudate head intraparenchymal   hemorrhage measuring 6 mm. Moderate intraventricular hemorrhage   throughout the left lateral ventricle, third ventricle and fourth   ventricle. Mild intraventricular hemorrhage in the right lateral   ventricle. Mild to moderate hydrocephalus, unchanged..    CT Head No Cont (04.28.24 @ 22:53)  IMPRESSION:  No change inintraventricular hemorrhage and dilatation of the lateral   and third ventricles compared with prior exam. Ill-defined hemorrhage   again seen in the left caudate body and left caudate head.    (04.28.24 @ 20:02)  IMPRESSION:  CTA NECK:  No significant stenosis of the cervical carotid arteries based   on NASCET criteria. Patent cervical vertebral arteries.    CTA HEAD: No high-grade stenosis or occlusion of the major proximal   arterial branches. No evidence of an intracranial arterial aneurysm or   arteriovenous malformation on CTA. No evidence of active bleeding within   the left caudate and intraventricular hemorrhage.    CT Brain Stroke Protocol (04.28.24 @ 20:02)  IMPRESSION:  Intraventricular hemorrhage, as described above. Ill-defined parenchymal   hemorrhage within the left caudate body and caudate head. Mild dilatation   of the lateral and third ventricles.    ULTRASOUND    US Duplex Venous Lower Ext Complete, Bilateral (04.29.24 @ 08:33)  IMPRESSION:  No evidence of deep venous thrombosis in either lower extremity.  Right Baker's cyst.    ECHO    TTE W or WO Ultrasound Enhancing Agent (04.30.24 @ 14:03)   CONCLUSIONS:   1. Limited study f/u for LV function.   2. Left ventricular cavity is normal in size. Left ventricular systolic function is normal with an ejection fraction visually estimated at 55 to 60 %.   3. Normal right ventricular cavity size and normal systolic function.   4. Mild to moderate tricuspid regurgitation.   5. No pericardial effusion seen.   6. Estimated pulmonary artery systolic pressure is 60 mmHg, consistent with severe pulmonary hypertension.   Preliminary note, offical recommendations pending attending review/signature   [facility] Stroke Team  Progress Note     HPI:  82 y/o female with PMHx HTN and A Fib (reportedly only started taking Eliquis x 1 week prior to admission, last dose 4/28 AM), hx of Breast CA? on Anastrazole, who presented to [place] ED complaining of headache on 4/28/24. Patient is a poor historian so hx obtained from chart. Per notes from [place], EMS reported SBP > 200 upon arrival to ED. Upon arrival to [place], CTH was performed and showed an intraventricular hemorrhage. Patient was reversed w/ Andexxa and started on cardene gtt, transferred to [facility] for further workup and care. Patient reportedly lost her  in January and had been taking care of him, and has been having difficulty taking care of herself since his passing. ICH 3 on admission. Patient admitted to Neuro ICU for further monitoring and care.     SUBJECTIVE: Overnight, patient started on sodium bicarb and urea BID, placed on 800mL/day fluid restriction per nephrology recommendations; additional sodium bicarb dose given for persistent hyponatremia. UA repeated w/ straight cath'd sample, concern for UTI, started on Rocephin. Also with episode of rapid afib with HR into the 140s, requiring IV lopressor.  No new neurologic complaints.  ROS reported negative unless otherwise noted.    acetaminophen     Tablet .. 650 milliGRAM(s) Oral every 6 hours PRN  anastrozole 1 milliGRAM(s) Oral daily  cefTRIAXone Injectable.      heparin   Injectable 5000 Unit(s) SubCutaneous every 8 hours  hydrALAZINE 25 milliGRAM(s) Oral three times a day  hydrALAZINE Injectable 10 milliGRAM(s) IV Push every 2 hours PRN  influenza  Vaccine (HIGH DOSE) 0.7 milliLiter(s) IntraMuscular once  labetalol Injectable 10 milliGRAM(s) IV Push every 2 hours PRN  latanoprost 0.005% Ophthalmic Solution 1 Drop(s) Both EYES at bedtime  levothyroxine 75 MICROGram(s) Oral daily  metoprolol tartrate 75 milliGRAM(s) Oral every 8 hours  senna 2 Tablet(s) Oral at bedtime PRN  sodium bicarbonate 1300 milliGRAM(s) Oral three times a day  urea Oral Powder 15 Gram(s) Oral two times a day  valsartan 80 milliGRAM(s) Oral <User Schedule>      PHYSICAL EXAM:   Vital Signs Last 24 Hrs  T(C): 37.2 (04 May 2024 07:26), Max: 37.2 (04 May 2024 07:26)  T(F): 99 (04 May 2024 07:26), Max: 99 (04 May 2024 07:26)  HR: 109 (04 May 2024 08:00) (102 - 128)  BP: 147/73 (04 May 2024 06:00) (142/90 - 165/135)  BP(mean): 93 (04 May 2024 06:00) (93 - 146)  RR: 16 (04 May 2024 06:00) (16 - 20)  SpO2: 95% (04 May 2024 06:00) (93% - 98%)    Parameters below as of 04 May 2024 06:00  Patient On (Oxygen Delivery Method): nasal cannula  O2 Flow (L/min): 2    General: No acute distress.     NEUROLOGICAL EXAM:  Mental status: Patient appear more alert today. Upon awakening, patient is alert, oriented x 2, oriented to self, place, disoriented to year and month. Speech fluent, follows commands, no neglect, short-term memory appears to be impaired, patient does not recall why she is in the hospital.   Cranial Nerves: No facial asymmetry, no nystagmus, no dysarthria,  tongue midline  Motor exam: Normal tone, no drift, 5/5 RUE, 5/5 RLE, 5/5 LUE, 5/5 LLE, normal fine finger movements.  Sensation: Intact to light touch   Coordination: no dysmetria  Gait: deferred      LABS:                        13.1   25.36 )-----------( 308      ( 04 May 2024 02:25 )             37.0    05-04    126<L>  |  91<L>  |  31.9<H>  ----------------------------<  103<H>  3.7   |  24.0  |  0.38<L>    Ca    8.2<L>      04 May 2024 02:25  Phos  2.8     05-04  Mg     1.7     05-04      04-29 Chol 151 LDL=60 HDL 79 Trig 62, 03-23 Chol 145 LDL -- HDL 71 Trig 41    A1C: 5.3    NEUROIMAGING    CT Head No Cont (05.03.24 @ 02:57)  IMPRESSION:  Left caudate nucleus intraparenchymal hemorrhage with intraventricular   extension, grossly stable overall. Mild hydrocephalus is grossly stable.    CT Head No Cont (05.01.24 @ 08:30)   IMPRESSION: Stable exam.    CT Head No Cont (04.29.24 @ 20:33)   IMPRESSION: Stable acute small left caudate head intraparenchymal   hemorrhage measuring 6 mm. Moderate intraventricular hemorrhage   throughout the left lateral ventricle, third ventricle and fourth   ventricle. Mild intraventricular hemorrhage in the right lateral   ventricle. Mild to moderate hydrocephalus, unchanged..    CT Head No Cont (04.28.24 @ 22:53)  IMPRESSION:  No change inintraventricular hemorrhage and dilatation of the lateral   and third ventricles compared with prior exam. Ill-defined hemorrhage   again seen in the left caudate body and left caudate head.    (04.28.24 @ 20:02)  IMPRESSION:  CTA NECK:  No significant stenosis of the cervical carotid arteries based   on NASCET criteria. Patent cervical vertebral arteries.    CTA HEAD: No high-grade stenosis or occlusion of the major proximal   arterial branches. No evidence of an intracranial arterial aneurysm or   arteriovenous malformation on CTA. No evidence of active bleeding within   the left caudate and intraventricular hemorrhage.    CT Brain Stroke Protocol (04.28.24 @ 20:02)  IMPRESSION:  Intraventricular hemorrhage, as described above. Ill-defined parenchymal   hemorrhage within the left caudate body and caudate head. Mild dilatation   of the lateral and third ventricles.    ULTRASOUND    US Duplex Venous Lower Ext Complete, Bilateral (04.29.24 @ 08:33)  IMPRESSION:  No evidence of deep venous thrombosis in either lower extremity.  Right Baker's cyst.    ECHO    TTE W or WO Ultrasound Enhancing Agent (04.30.24 @ 14:03)   CONCLUSIONS:   1. Limited study f/u for LV function.   2. Left ventricular cavity is normal in size. Left ventricular systolic function is normal with an ejection fraction visually estimated at 55 to 60 %.   3. Normal right ventricular cavity size and normal systolic function.   4. Mild to moderate tricuspid regurgitation.   5. No pericardial effusion seen.   6. Estimated pulmonary artery systolic pressure is 60 mmHg, consistent with severe pulmonary hypertension.   [facility] Stroke Team  Progress Note     HPI:  84 y/o female with PMHx HTN and A Fib (reportedly only started taking Eliquis x 1 week prior to admission, last dose 4/28 AM), hx of Breast CA? on Anastrazole, who presented to [place] ED complaining of headache on 4/28/24. Patient is a poor historian so hx obtained from chart. Per notes from [place], EMS reported SBP > 200 upon arrival to ED. Upon arrival to [place], CTH was performed and showed an intraventricular hemorrhage. Patient was reversed w/ Andexxa and started on cardene gtt, transferred to [facility] for further workup and care. Patient reportedly lost her  in January and had been taking care of him, and has been having difficulty taking care of herself since his passing. ICH 3 on admission. Patient admitted to Neuro ICU for further monitoring and care.     SUBJECTIVE: Overnight, patient started on sodium bicarb and urea BID, placed on 800mL/day fluid restriction per nephrology recommendations; additional sodium bicarb dose given for persistent hyponatremia. UA repeated w/ straight cath'd sample, concern for UTI, started on Rocephin. Also with episode of rapid afib with HR into the 140s, requiring IV lopressor.  No new neurologic complaints.  ROS reported negative unless otherwise noted.    acetaminophen     Tablet .. 650 milliGRAM(s) Oral every 6 hours PRN  anastrozole 1 milliGRAM(s) Oral daily  cefTRIAXone Injectable.      heparin   Injectable 5000 Unit(s) SubCutaneous every 8 hours  hydrALAZINE 25 milliGRAM(s) Oral three times a day  hydrALAZINE Injectable 10 milliGRAM(s) IV Push every 2 hours PRN  influenza  Vaccine (HIGH DOSE) 0.7 milliLiter(s) IntraMuscular once  labetalol Injectable 10 milliGRAM(s) IV Push every 2 hours PRN  latanoprost 0.005% Ophthalmic Solution 1 Drop(s) Both EYES at bedtime  levothyroxine 75 MICROGram(s) Oral daily  metoprolol tartrate 75 milliGRAM(s) Oral every 8 hours  senna 2 Tablet(s) Oral at bedtime PRN  sodium bicarbonate 1300 milliGRAM(s) Oral three times a day  urea Oral Powder 15 Gram(s) Oral two times a day  valsartan 80 milliGRAM(s) Oral <User Schedule>      PHYSICAL EXAM:   Vital Signs Last 24 Hrs  T(C): 37.2 (04 May 2024 07:26), Max: 37.2 (04 May 2024 07:26)  T(F): 99 (04 May 2024 07:26), Max: 99 (04 May 2024 07:26)  HR: 109 (04 May 2024 08:00) (102 - 128)  BP: 147/73 (04 May 2024 06:00) (142/90 - 165/135)  BP(mean): 93 (04 May 2024 06:00) (93 - 146)  RR: 16 (04 May 2024 06:00) (16 - 20)  SpO2: 95% (04 May 2024 06:00) (93% - 98%)    Parameters below as of 04 May 2024 06:00  Patient On (Oxygen Delivery Method): nasal cannula  O2 Flow (L/min): 2    General: No acute distress.     NEUROLOGICAL EXAM:  Mental status: Patient appear more alert today. Upon awakening, patient is alert, oriented x 2, oriented to self, place, disoriented to year and month. Speech fluent, follows commands, no neglect, short-term memory appears to be impaired, patient does not recall why she is in the hospital.   Cranial Nerves: No facial asymmetry, no nystagmus, no dysarthria,  tongue midline  Motor exam: Normal tone, no drift, 5/5 RUE, 5/5 RLE, 5/5 LUE, 5/5 LLE, normal fine finger movements.  Sensation: Intact to light touch   Coordination: no dysmetria  Gait: deferred      LABS:                        13.1   25.36 )-----------( 308      ( 04 May 2024 02:25 )             37.0    05-04    126<L>  |  91<L>  |  31.9<H>  ----------------------------<  103<H>  3.7   |  24.0  |  0.38<L>    Ca    8.2<L>      04 May 2024 02:25  Phos  2.8     05-04  Mg     1.7     05-04      04-29 Chol 151 LDL=60 HDL 79 Trig 62, 03-23 Chol 145 LDL -- HDL 71 Trig 41    A1C: 5.3    NEUROIMAGING    CT Head No Cont (05.03.24 @ 02:57)  IMPRESSION:  Left caudate nucleus intraparenchymal hemorrhage with intraventricular   extension, grossly stable overall. Mild hydrocephalus is grossly stable.    CT Head No Cont (05.01.24 @ 08:30)   IMPRESSION: Stable exam.    CT Head No Cont (04.29.24 @ 20:33)   IMPRESSION: Stable acute small left caudate head intraparenchymal   hemorrhage measuring 6 mm. Moderate intraventricular hemorrhage   throughout the left lateral ventricle, third ventricle and fourth   ventricle. Mild intraventricular hemorrhage in the right lateral   ventricle. Mild to moderate hydrocephalus, unchanged..    CT Head No Cont (04.28.24 @ 22:53)  IMPRESSION:  No change inintraventricular hemorrhage and dilatation of the lateral   and third ventricles compared with prior exam. Ill-defined hemorrhage   again seen in the left caudate body and left caudate head.    (04.28.24 @ 20:02)  IMPRESSION:  CTA NECK:  No significant stenosis of the cervical carotid arteries based   on NASCET criteria. Patent cervical vertebral arteries.    CTA HEAD: No high-grade stenosis or occlusion of the major proximal   arterial branches. No evidence of an intracranial arterial aneurysm or   arteriovenous malformation on CTA. No evidence of active bleeding within   the left caudate and intraventricular hemorrhage.    CT Brain Stroke Protocol (04.28.24 @ 20:02)  IMPRESSION:  Intraventricular hemorrhage, as described above. Ill-defined parenchymal   hemorrhage within the left caudate body and caudate head. Mild dilatation   of the lateral and third ventricles.    ULTRASOUND    US Duplex Venous Lower Ext Complete, Bilateral (04.29.24 @ 08:33)  IMPRESSION:  No evidence of deep venous thrombosis in either lower extremity.  Right Baker's cyst.    ECHO    TTE W or WO Ultrasound Enhancing Agent (04.30.24 @ 14:03)   CONCLUSIONS:   1. Limited study f/u for LV function.   2. Left ventricular cavity is normal in size. Left ventricular systolic function is normal with an ejection fraction visually estimated at 55 to 60 %.   3. Normal right ventricular cavity size and normal systolic function.   4. Mild to moderate tricuspid regurgitation.   5. No pericardial effusion seen.   6. Estimated pulmonary artery systolic pressure is 60 mmHg, consistent with severe pulmonary hypertension.

## 2024-05-04 NOTE — CHART NOTE - NSCHARTNOTEFT_GEN_A_CORE
84 y/o female with PMHx HTN and A Fib (reportedly only started taking Eliquis x 1 week prior to admission, last dose 4/28 AM), hx of Breast CA? on Anastrazole, who presented to [place] ED complaining of headache on 4/28/24. Patient is a poor historian so hx obtained from chart. Per notes from [place], EMS reported SBP > 200 upon arrival to ED. Upon arrival to [place], CTH was performed and showed an intraventricular hemorrhage. pt noted also with Afib with RVR rates 120-130       Problem:   RN called to report lactate of 3.1   Chart reviewed noted to have increased WBC and positive UTI  Blood cultures and urine cultures  sent previous  and pt on IV ABO Rocephin  UTI   Pt is hemodynamically stable     Discussed with attending     Pt seen at bedside   PE:   General: pt awaken to verbal and in no acute distress    HEENT:  Atraumatic. Normocephalic.  Normal oral mucosa  Neurologic: A & O x 2, no gross focal deficits.  Cardiovascular: irregular S1 S2, No murmur, no rubs/gallops. No JVD  Respiratory: Lungs clear to auscultation, unlabored   Gastrointestinal:  Soft, Non-tender, + BS  Lower Extremities: 2+ Peripheral Pulses, no  edema  Psychiatry: Patient is calm. No agitation.   Skin: warm and dry.    Vital Signs Last 24 Hrs  T(C): 37.2 (04 May 2024 07:26), Max: 37.2 (04 May 2024 07:26)  T(F): 99 (04 May 2024 07:26), Max: 99 (04 May 2024 07:26)  HR: 121 (04 May 2024 14:00) (94 - 128)  BP: 140/99 (04 May 2024 14:00) (123/111 - 161/130)  BP(mean): 112 (04 May 2024 14:00) (93 - 140)  RR: 19 (04 May 2024 14:00) (16 - 19)  SpO2: 97% (04 May 2024 14:00) (93% - 99%)    Parameters below as of 04 May 2024 14:00  Patient On (Oxygen Delivery Method): room air    Plan:   Bolus pt IV Fluids per 30cckg 1850ml ns   stat chest xray r/o aspiration   Called ID for consult   Repeat lactate ordered in 4 hours 19:00   Will sign out to night PA to follow 84 y/o female with PMHx HTN and A Fib (reportedly only started taking Eliquis x 1 week prior to admission, last dose 4/28 AM), hx of Breast CA? on Anastrazole, who presented to [place] ED complaining of headache on 4/28/24. Patient is a poor historian so hx obtained from chart. Per notes from [place], EMS reported SBP > 200 upon arrival to ED. Upon arrival to [place], CTH was performed and showed an intraventricular hemorrhage. pt noted also with Afib with RVR rates 120-130     Problem:   RN called to report lactate of 3.1   Chart reviewed noted to have increased WBC and positive UTI  Blood cultures and urine cultures  sent previous  and pt on IV ABO Rocephin  UTI   Pt is hemodynamically stable     Discussed with attending     Pt seen at bedside   PE:   General: pt awaken to verbal and in no acute distress    HEENT:  Atraumatic. Normocephalic.  Normal oral mucosa  Neurologic: A & O x 2, no gross focal deficits.  Cardiovascular: irregular S1 S2, No murmur, no rubs/gallops. No JVD  Respiratory: Lungs clear to auscultation, unlabored   Gastrointestinal:  Soft, Non-tender, + BS  Lower Extremities: 2+ Peripheral Pulses, no  edema  Psychiatry: Patient is calm. No agitation.   Skin: warm and dry.    Vital Signs Last 24 Hrs  T(C): 37.2 (04 May 2024 07:26), Max: 37.2 (04 May 2024 07:26)  T(F): 99 (04 May 2024 07:26), Max: 99 (04 May 2024 07:26)  HR: 121 (04 May 2024 14:00) (94 - 128)  BP: 140/99 (04 May 2024 14:00) (123/111 - 161/130)  BP(mean): 112 (04 May 2024 14:00) (93 - 140)  RR: 19 (04 May 2024 14:00) (16 - 19)  SpO2: 97% (04 May 2024 14:00) (93% - 99%)    Parameters below as of 04 May 2024 14:00  Patient On (Oxygen Delivery Method): room air    Plan:   Bolus pt IV Fluids per 30cckg 1850ml ns   stat chest xray r/o aspiration   Called ID for consult  suggested adding zosyn IV now and q8   Repeat lactate ordered in 4 hours 19:00   Will sign out to night PA to follow

## 2024-05-04 NOTE — PROGRESS NOTE ADULT - ASSESSMENT
ASSESSMENT:  84 y/o female with PMHx HTN and A Fib (reportedly only started taking Eliquis x 1 week prior to admission, last dose 4/28 AM), hx of Breast CA? on Anastrazole, who presented to [place] ED complaining of headache on 4/28/24. Patient is a poor historian so hx obtained from chart. Per notes from [place], EMS reported SBP > 200 upon arrival to ED. Upon arrival to [place], CTH was performed and showed an intraventricular hemorrhage. Patient was reversed w/ Andexxa and started on cardene gtt, transferred to [facility] for further workup and care.    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 Patient has a hx of noncompliance to meds.    5/2: Na continuing to downtrend despite 1g sodium chloride tablets PO ordered daily starting 5/1, increased sodium tablets to TID, 1000mL fluid restriction ordered.  5/3: Overnight, patient with lethargy, found to have worsening hyponatremia to 125 @ 1920, despite interventions. Patient initiated on 2% Hypertonic Sodium Chloride @ 30 cc's/Hr Stat per Dr. Quintana overnight, repeat BMPs Q 6 hrs, CT Head ordered. PO salt tabs d/c'd. Repeat Na 132 @ 0430. 2% Sodium Chloride d/c'd as concern Na was rising too quickly. Repeat Na 129 @ 0930. PO salt tabs reinitiated. Pending nephrology c/s. CT Head was stable. Additionally overnight patient had Potassium 2.8, Calcium 5.8, and Magnesium 1.3 on AM labs  5/4: Nephrology consulted, added sodium bicarb BID and urea BID, fluid restricted to 800mL/day; repeat BMP @ 3AM w/ Kh=397, extra dose of sodium bicarb ordered. 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  Anticoagulation only to be restarted if BP remains stable and if she can demonstrate compliance. Per cardio note: CHADSVasc 4, but currently with acute cerebral bleeding and with recent fall injury/trauma not ideal candidate for long term AC use, consider elective outpatient LAAO   -CTH 5/3 demonstrating stable exam  -STATIN THERAPY: Not indicated at this time given IVH, LDL 60  -LDL 60   -STAT head CT for any neurologic change, especially given patient has hydrocephalus  -DYSPHAGIA: PASS   -Will need serial imaging to monitor hydrocephalus  -Head of bed at 30 degrees   -PT/OT/Speech eval and treatment   -Patient refusing rehab per case management, states she wants to go home --> Pending OT cognitive eval     CARDIAC:  #Atrial fibrillation #Hypertensive emergency #HTN  -Episode of afib w/ RVR overnight 5/3-5/4, IV lopressor given with improvement in HR down to 100s-110s  -VS q 2 hours  -TTE as noted: EF 55-60%  -Cardiology c/s for recommendations in management   -Valsartan increased to 80mg TID per cardio 5/2 given ongoing HTN   -IVP PRN antihypertensives for sustained SBP >150  -Continue cardiac monitoring pending findings of further workup    PULM:  -Patient tolerating room air, saturating well  -Continue to maintain spO2 > 94%    HEME:  -H/H 13.2/37.8   -Platelets 342  -Continue to monitor, CBC q daily  -US Duplex Bilateral Lower Extremity Venous negative for evidence of DVT  -DVT ppx: Heparin subcutaneous q 8h    GI:  -Abdominal exam benign, patient with no complaints  -Diet: Regular, 1000mL fluid restriction  -Bowel regimen: Senna PO at bedtime PRN constipation    RENAL/METABOLIC:  #Urinary retention #Hyponatremia #Hypophosphatemia #Hypocalcemia #Hypomagnesemia   #SIADH vs Cerebral Salt Wasting   -BUN/Cr 31.9/0.38, GFR 98  -Hypokalemia, hypocalcemia resolved on today's labs; Mag 1.7, ordered 2mg IV x1 dose   -Persistent hyponatremia, Na 126   -Na continuing to downtrend despite 1g sodium chloride tablets PO ordered daily starting 5/1 --> increased sodium tablets to 2g BID, 800mL fluid restriction ordered per nephrology recommendations    -Concern for SIADH vs cerebral salt wasting; nephro consulted, ordered sodium bicarb 1300mg TID, urea 15g BID, 800mL fluid restriction  -Urine sodium 105  -Urine osmolality 471  -Serum osmolality 277  -BMP q 6 hours  -Replete electrolytes prn    ENDOCRINE:  -A1C 5.3  -TSH 1.24    ID:  #Leukocytosis  -WBC uptrending; 14.01-->25.36; started on IV rocephin for UTI ; f/u urine culture results   -Afebrile  -Continue to monitor for signs/symptoms of infection    OTHER:   -Condition discussed w/ patient and family at bedside.  -Patient's brother is primary surrogate for patient, Umberto Butler [phone]    DISPO: Pending clinical course    CORE MEASURES:        Admission NIHSS: 0     Tenecteplase : [] YES [x] NO      LDL/A1C: 60/5.3     Depression Screen- if depression hx and/or present      Statin Therapy: Not indicated at this time, IVH     Dysphagia Screen: [x] PASS [] FAIL     Smoking [] YES [x] NO      Afib [x] YES [] NO     Stroke Education [x] YES [] NO   ASSESSMENT:   84 y/o female with PMHx HTN and A Fib (reportedly only started taking Eliquis x 1 week prior to admission, last dose 4/28 AM), hx of Breast CA? on Anastrazole, who presented to [place] ED complaining of headache on 4/28/24. Patient is a poor historian so hx obtained from chart. Per notes from [place], EMS reported SBP > 200 upon arrival to ED. Upon arrival to [place], CTH was performed and showed an intraventricular hemorrhage. Patient was reversed w/ Andexxa and started on cardene gtt, transferred to [facility] for further workup and care.    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 Patient has a hx of noncompliance to meds.    NEURO:   -Neurologically with improving mental status; more alert than previous examinations  -Transferred to medicine service 5/4/24 for ongoing management of hyponatremia, UTI, afib w/ RVR, hypertension    -Continue close monitoring for neurologic deterioration    - Stroke neuro checks q2hrs  -SBP goal 130-150mmHg, maintain BP under 150/90 mmHg   -ANTITHROMBOTIC THERAPY: Not indicated at this time given IVH, hold all AP/AC, eventually will need to be restarted on at minimum antiplatelets, but would hold of at least 14 days (5/12/24 would be 14 days from onset). Anticoagulation only to be restarted if BP remains stable and if she can demonstrate compliance. Per cardio note: CHADSVasc 4, but currently with acute cerebral bleeding and with recent fall injury/trauma not ideal candidate for long term AC use, consider elective outpatient LAAO   -Statin therapy not indicated at this time given LDL=60  -MRI Brain w/ and w/o pending   -Dysphagia screen: pass  -Physical therapy/OT/Speech eval/treatment.   -STAT head CT for any neurologic change, especially given patient has hydrocephalus  -DYSPHAGIA: PASS   -Will need serial imaging to monitor hydrocephalus  -Head of bed at 30 degrees   -TTE results as above; EF 55-60%,          -patient should have all age and risk appropriate malignancy screenings with PCP or sooner if clinically suspected   -DVT ppx: Heparin s.c [x] LMWH [] SCD[]    -maintain adequate hydration    -Na Goal: 135-145 ; Most recent St=031, further management per medicine/nephrology   -monitor for si/sx of infection   -LDL/A1C results as above   -Stroke education     OTHER:    -condition and plan of care d/w patient, medicine team; questions and concerns addressed.     DISPOSITION: Rehab or home depending on PT eval once stable and workup is complete    CORE MEASURES:        Admission NIHSS: 0     Tenecteplase : [] YES [x] NO      LDL/A1C: 60/5.3     Depression Screen- if depression hx and/or present      Statin Therapy: Not indicated at this time, IVH     Dysphagia Screen: [x] PASS [] FAIL     Smoking [] YES [x] NO      Afib [x] YES [] NO     Stroke Education [x] YES [] NO

## 2024-05-04 NOTE — PROGRESS NOTE ADULT - ASSESSMENT
84 y/o female with PMHx HTN, Breast Ca (dx 2020, surgery, no chemo/radiation) and A Fib (started taking  Eliquis 1 week ago, last dose 4/28 AM) BIBA to [facility]  from home due to headache. Patient had recently been admitted due to HTN after running out of her coreg. CT head found with intraventricular bleed and was transferred to [facility] ED for further evaluation. Pt was initially admitted to NICU team then dowgrade to step down under neurology team. She was given Andexxa for Eliquis reversal and BP was controlled with nicardipine for SBP > 200s. Course was complicated by Afib rvr, hyponatremia and uti w/leukocytosis .Pt was started IV Rocephin today. Repeat CTH on 05/03 stable.TTE ef 55-60%,mild to mod TR.Cardiology and nephrology are foloowing. Pt will be transfer to medicine team from neurology team.   Of note patient endorses that she had fallen and hit her head a couple of days before initiation of Eliquis but did not communicate to team at that time.     -Here with intraventricular hemorrhage 2/2 trauma. AC on hold for at least 14 days per neuro. Pend MR head.       IVH/IPH  Suspect secondary to HTN emergency with underlying medication induced coagulopathy from Eliquis,S/P recent fall   Hypertensive urgency  -s/p cardene drip  -neurocheck  -repeat cth stable   -Pending MR Brain  -CTA brain/neck stable  -SBP goal per neuro is 130-150  -continue hydralazine,metoprolol, valsartan  -Monitor BP  -F/U neurology rec    Hyponatremia  - started on sodium bicarb TID and urea BID,   - placed on 800mL/day fluid restriction per nephrology  - f/u Nephrology rec  -F/U BMP      UTI  -UA repeated w/ straight cath'd sample, concern for UTI,   -started on Rocephin.       Afib rvr.    -Recent diagnosis  - Not rate controlled  - s/p  IV lopressor  -cardiology is adjusting lopressor dose ,increased to 75 mg q8hr w/prn iv lopressor  -TSH normal  -TTE reviewed  -Hold AC for ICH  -F/U cardiology rec  -Outpatient LAAO evaluation    HX Breast cancer   -dx 2020, surgery, no chemo/radiation  -on Anastrozole    Hypothyroidism  -on synthroid  -tsh stable    dvt ppx scd;s  gi ppx PPI    Plan of care dw pt  dw Neurology team  TEJAS RN       82 y/o female with PMHx HTN, Breast Ca (dx 2020, surgery, no chemo/radiation) and A Fib (started taking  Eliquis 1 week ago, last dose 4/28 AM) BIBA to [facility]  from home due to headache. Patient had recently been admitted due to HTN after running out of her coreg. CT head found with intraventricular bleed and was transferred to [facility] ED for further evaluation. Pt was initially admitted to NICU team then dowgrade to step down under neurology team. She was given Andexxa for Eliquis reversal and BP was controlled with nicardipine for SBP > 200s. Course was complicated by Afib rvr, hyponatremia and uti w/leukocytosis .Pt was started IV Rocephin today. Repeat CTH on 05/03 stable.TTE ef 55-60%,mild to mod TR.Cardiology and nephrology are foloowing. Pt will be transfer to medicine team from neurology team.   Of note patient endorses that she had fallen and hit her head a couple of days before initiation of Eliquis but did not communicate to team at that time.     -Here with intraventricular hemorrhage 2/2 trauma. AC on hold for at least 14 days per neuro. Pend MR head.       IVH/IPH  Suspect secondary to HTN emergency with underlying medication induced coagulopathy from Eliquis,S/P recent fall   Hypertensive urgency  -s/p cardene drip  -neurocheck  -repeat cth stable   -Pending MR Brain  -CTA brain/neck stable  -SBP goal per neuro is 130-150  -continue hydralazine,metoprolol, valsartan  -Monitor BP  -F/U neurology rec    Hyponatremia  - started on sodium bicarb TID and urea BID,   - placed on 800mL/day fluid restriction per nephrology  - f/u Nephrology rec  -F/U BMP      UTI  -UA repeated w/ straight cath'd sample, concern for UTI,   -started on Rocephin.       Afib rvr.    -Recent diagnosis  - Not rate controlled  - s/p  IV lopressor  -cardiology is adjusting lopressor dose ,increased to 75 mg q8hr w/prn iv lopressor  -TSH normal  -TTE reviewed  -Hold AC for ICH  -F/U cardiology rec  -Outpatient LAAO evaluation    HX Breast cancer   -dx 2020, surgery, no chemo/radiation  -on Anastrozole    Hypothyroidism  -on synthroid  -tsh stable    dvt ppx sq heparin  gi ppx PPI    Plan of care dw pt  dw Neurology team  TEJAS RN

## 2024-05-04 NOTE — PROGRESS NOTE ADULT - SUBJECTIVE AND OBJECTIVE BOX
Patient is a 83y old  Female who presents with a chief complaint of IVH (04 May 2024 09:22)      pt denies headache,dysuria,abd/flank/chest pain,sob,fever,chill,weakness/numbness  REVIEW OF SYSTEMS: All systems are reviewed and found to be negative except above    MEDICATIONS  (STANDING):  anastrozole 1 milliGRAM(s) Oral daily  cefTRIAXone Injectable.      heparin   Injectable 5000 Unit(s) SubCutaneous every 8 hours  hydrALAZINE 25 milliGRAM(s) Oral three times a day  influenza  Vaccine (HIGH DOSE) 0.7 milliLiter(s) IntraMuscular once  latanoprost 0.005% Ophthalmic Solution 1 Drop(s) Both EYES at bedtime  levothyroxine 75 MICROGram(s) Oral daily  metoprolol tartrate 75 milliGRAM(s) Oral every 8 hours  sodium bicarbonate 1300 milliGRAM(s) Oral three times a day  urea Oral Powder 15 Gram(s) Oral two times a day  valsartan 80 milliGRAM(s) Oral <User Schedule>    MEDICATIONS  (PRN):  acetaminophen     Tablet .. 650 milliGRAM(s) Oral every 6 hours PRN Mild Pain (1 - 3)  hydrALAZINE Injectable 10 milliGRAM(s) IV Push every 2 hours PRN SBP> 150 and DBP> 90  labetalol Injectable 10 milliGRAM(s) IV Push every 2 hours PRN SBP> 150 and DBP> 90  senna 2 Tablet(s) Oral at bedtime PRN Constipation      CAPILLARY BLOOD GLUCOSE        I&O's Summary    03 May 2024 07:01  -  04 May 2024 07:00  --------------------------------------------------------  IN: 680 mL / OUT: 1600 mL / NET: -920 mL        PHYSICAL EXAM:  Vital Signs Last 24 Hrs  T(C): 37.2 (04 May 2024 07:26), Max: 37.2 (04 May 2024 07:26)  T(F): 99 (04 May 2024 07:26), Max: 99 (04 May 2024 07:26)  HR: 104 (04 May 2024 10:00) (102 - 128)  BP: 145/104 (04 May 2024 10:00) (142/90 - 165/135)  BP(mean): 111 (04 May 2024 10:00) (93 - 146)  RR: 16 (04 May 2024 10:00) (16 - 20)  SpO2: 99% (04 May 2024 10:00) (93% - 99%)    Parameters below as of 04 May 2024 10:00  Patient On (Oxygen Delivery Method): room air        CONSTITUTIONAL: NAD,  EYES: PERRLA; conjunctiva and sclera clear  ENMT: Moist oral mucosa,   RESPIRATORY: Normal respiratory effort; lungs are clear to auscultation bilaterally  CARDIOVASCULAR: IRR, normal S1 and S2, no murmur   EXTS: No lower extremity edema; Peripheral pulses are 2+ bilaterally  ABDOMEN: Nontender to palpation, normoactive bowel sounds, no rebound/guarding;   MUSCLOSKELETAL:  ; no joint swelling or tenderness to palpation  PSYCH: affect appropriate  NEUROLOGY: A+O to person, place, and on/off time; CN 2-12 are intact and symmetric; no gross sensory deficits;   pt has on/off confusion      LABS:                        13.1   25.36 )-----------( 308      ( 04 May 2024 02:25 )             37.0     05-04    126<L>  |  91<L>  |  31.9<H>  ----------------------------<  103<H>  3.7   |  24.0  |  0.38<L>    Ca    8.2<L>      04 May 2024 02:25  Phos  2.8     05-04  Mg     1.7     05-04            Urinalysis Basic - ( 04 May 2024 02:25 )    Color: x / Appearance: x / SG: x / pH: x  Gluc: 103 mg/dL / Ketone: x  / Bili: x / Urobili: x   Blood: x / Protein: x / Nitrite: x   Leuk Esterase: x / RBC: x / WBC x   Sq Epi: x / Non Sq Epi: x / Bacteria: x          RADIOLOGY & ADDITIONAL TESTS:  Results Reviewed:   < from: TTE W or WO Ultrasound Enhancing Agent (04.30.24 @ 14:03) >     CONCLUSIONS:      1. Limited study f/u for LV function.   2. Left ventricular cavity is normal in size. Left ventricular systolic function is normal with an ejection fraction visually estimated at 55 to 60 %.   3. Normal right ventricular cavity size and normal systolic function.   4. Mild to moderate tricuspid regurgitation.   5. No pericardial effusion seen.   6. Estimated pulmonary artery systolic pressure is 60 mmHg, consistent with severe pulmonary hypertension.    < end of copied text >  < from: CT Head No Cont (05.03.24 @ 02:57) >  IMPRESSION:  Left caudate nucleus intraparenchymal hemorrhage with intraventricular   extension, grossly stable overall. Mild hydrocephalus is grossly stable.    < end of copied text >  < from: CT Angio Brain Stroke Protocol  w/ IV Cont (04.28.24 @ 20:02) >    IMPRESSION:    CTA NECK:  No significant stenosis of the cervical carotid arteries based   on NASCET criteria. Patent cervical vertebral arteries.    CTA HEAD: No high-grade stenosis or occlusion of the major proximal   arterial branches. No evidence of an intracranial arterial aneurysm or   arteriovenous malformation on CTA. No evidence of active bleeding within   the left caudate and intraventricular hemorrhage.    < end of copied text >

## 2024-05-04 NOTE — PROGRESS NOTE ADULT - SUBJECTIVE AND OBJECTIVE BOX
NEPHROLOGY INTERVAL HPI/OVERNIGHT EVENTS:    Developed lactic  acid  today resolved  with saline.    MEDICATIONS  (STANDING):  anastrozole 1 milliGRAM(s) Oral daily  cefTRIAXone Injectable.      heparin   Injectable 5000 Unit(s) SubCutaneous every 8 hours  hydrALAZINE 25 milliGRAM(s) Oral three times a day  influenza  Vaccine (HIGH DOSE) 0.7 milliLiter(s) IntraMuscular once  latanoprost 0.005% Ophthalmic Solution 1 Drop(s) Both EYES at bedtime  levothyroxine 75 MICROGram(s) Oral daily  metoprolol tartrate 75 milliGRAM(s) Oral every 8 hours  pantoprazole    Tablet 40 milliGRAM(s) Oral before breakfast  piperacillin/tazobactam IVPB.- 3.375 Gram(s) IV Intermittent once  sodium chloride 0.9%. 1000 milliLiter(s) (100 mL/Hr) IV Continuous <Continuous>  valsartan 80 milliGRAM(s) Oral <User Schedule>    MEDICATIONS  (PRN):  acetaminophen     Tablet .. 650 milliGRAM(s) Oral every 6 hours PRN Mild Pain (1 - 3)  hydrALAZINE Injectable 10 milliGRAM(s) IV Push every 2 hours PRN SBP> 150 and DBP> 90  labetalol Injectable 10 milliGRAM(s) IV Push every 2 hours PRN SBP> 150 and DBP> 90  senna 2 Tablet(s) Oral at bedtime PRN Constipation      Allergies    No Known Allergies          Vital Signs Last 24 Hrs  T(C): 36.4 (04 May 2024 15:46), Max: 37.2 (04 May 2024 07:26)  T(F): 97.6 (04 May 2024 15:46), Max: 99 (04 May 2024 07:26)  HR: 119 (04 May 2024 18:00) (94 - 126)  BP: 148/96 (04 May 2024 18:00) (123/111 - 159/107)  BP(mean): 113 (04 May 2024 18:00) (89 - 121)  RR: 19 (04 May 2024 18:00) (16 - 19)  SpO2: 99% (04 May 2024 18:00) (93% - 100%)    Parameters below as of 04 May 2024 18:00  Patient On (Oxygen Delivery Method): room air         PHYSICAL EXAM:    GENERAL: comfortable in bed  HEAD:  wnl  EYES:   ENMT:   NECK: veins flat in bed  NERVOUS SYSTEM:    CHEST/LUNG: alert, oriented  HEART: tachy  ABDOMEN:nt   EXTREMITIES: legs  same   LYMPH:   SKIN: no rash  : Neg    LABS:                        13.1   25.36 )-----------( 308      ( 04 May 2024 02:25 )             37.0     05-04    126<L>  |  87<L>  |  37.0<H>  ----------------------------<  145<H>  3.8   |  25.0  |  0.59    Ca    9.0      04 May 2024 12:08  Phos  2.8     05-04  Mg     1.7     05-04        Urinalysis Basic - ( 04 May 2024 12:08 )    Color: x / Appearance: x / SG: x / pH: x  Gluc: 145 mg/dL / Ketone: x  / Bili: x / Urobili: x   Blood: x / Protein: x / Nitrite: x   Leuk Esterase: x / RBC: x / WBC x   Sq Epi: x / Non Sq Epi: x / Bacteria: x      Magnesium: 1.7 mg/dL (05-04 @ 02:25)  Phosphorus: 2.8 mg/dL (05-04 @ 02:25)          RADIOLOGY & ADDITIONAL TESTS:

## 2024-05-04 NOTE — PROGRESS NOTE ADULT - PROBLEM SELECTOR PLAN 1
-Recent diagnosis last week  -Not rate controlled  -TSH normal  -EF preserved  -Here with intraventricular hemorrhage 2/2 trauma. AC on hold for at least 14 days per neuro. Pend MR head.   -Outpatient LAAO evaluation  -Will adjust BB accordingly -Recent diagnosis last week  -Not rate controlled  -TSH normal  -EF preserved  -Here with intraventricular hemorrhage 2/2 trauma. AC on hold for at least 14 days per neuro. Pend MR head.   -Outpatient L[facility]O evaluation    Will hold off on increasing BB dose / frequency. Tachycardia may be related to UTI / dehydration

## 2024-05-04 NOTE — PROGRESS NOTE ADULT - SUBJECTIVE AND OBJECTIVE BOX
[facility]                                                         CARDIOLOGY AT [facility]                                                                  [address] [address]                                                         Telephone: [phone]. Fax:[phone]                                                                             PROGRESS NOTE    Reason for follow up: afib RVR, HTN urgency  Update: Rates / SBP not well controlled      Review of symptoms:   Cardiac:  No chest pain. No dyspnea. No palpitations.  Respiratory: no cough. No dyspnea  Gastrointestinal: No diarrhea. No abdominal pain. No bleeding.   Neuro: No focal neuro complaints.    Vitals:  T(C): 37.2 (05-04-24 @ 07:26), Max: 37.2 (05-04-24 @ 07:26)  HR: 109 (05-04-24 @ 08:00) (102 - 128)  BP: 147/73 (05-04-24 @ 06:00) (142/90 - 165/135)  RR: 16 (05-04-24 @ 06:00) (16 - 20)  SpO2: 95% (05-04-24 @ 06:00) (93% - 98%)  Wt(kg): --  I&O's Summary    03 May 2024 07:01  -  04 May 2024 07:00  --------------------------------------------------------  IN: 680 mL / OUT: 1600 mL / NET: -920 mL          PHYSICAL EXAM:  Appearance: Comfortable. No acute distress  HEENT:  Atraumatic. Normocephalic.  Normal oral mucosa  Neurologic: A & O x 3, no gross focal deficits.  Cardiovascular: irregular S1 S2, No murmur, no rubs/gallops. No JVD  Respiratory: Lungs clear to auscultation, unlabored   Gastrointestinal:  Soft, Non-tender, + BS  Lower Extremities: 2+ Peripheral Pulses, No clubbing, cyanosis, or edema  Psychiatry: Patient is calm. No agitation.   Skin: warm and dry.    CURRENT CARDIAC MEDICATIONS:  hydrALAZINE 25 milliGRAM(s) Oral three times a day  hydrALAZINE Injectable 10 milliGRAM(s) IV Push every 2 hours PRN  labetalol Injectable 10 milliGRAM(s) IV Push every 2 hours PRN  metoprolol tartrate 75 milliGRAM(s) Oral every 8 hours  urea Oral Powder 15 Gram(s) Oral two times a day  valsartan 80 milliGRAM(s) Oral <User Schedule>      CURRENT OTHER MEDICATIONS:  cefTRIAXone Injectable.      acetaminophen     Tablet .. 650 milliGRAM(s) Oral every 6 hours PRN Mild Pain (1 - 3)  senna 2 Tablet(s) Oral at bedtime PRN Constipation  levothyroxine 75 MICROGram(s) Oral daily  anastrozole 1 milliGRAM(s) Oral daily  heparin   Injectable 5000 Unit(s) SubCutaneous every 8 hours  influenza  Vaccine (HIGH DOSE) 0.7 milliLiter(s) IntraMuscular once  latanoprost 0.005% Ophthalmic Solution 1 Drop(s) Both EYES at bedtime  magnesium sulfate  IVPB 2 Gram(s) IV Intermittent once, Stop order after: 1 Doses  sodium bicarbonate 1300 milliGRAM(s) Oral three times a day      LABS:	 	                            13.1   25.36 )-----------( 308      ( 04 May 2024 02:25 )             37.0     05-04    126<L>  |  91<L>  |  31.9<H>  ----------------------------<  103<H>  3.7   |  24.0  |  0.38<L>    Ca    8.2<L>      04 May 2024 02:25  Phos  2.8     05-04  Mg     1.7     05-04      PT/INR/PTT ( 29 Apr 2024 06:00 )                       :                       :      13.8         :       27.9                  .        .                   .              .           .       1.25        .                                       Lipid Profile: Date: 04-29 @ 06:00  Total cholesterol 151; Direct LDL: --; HDL: 79; Triglycerides:62    HgA1c:   TSH: Thyroid Stimulating Hormone, Serum: 1.24 uIU/mL      TELEMETRY: afib 130-150      DIAGNOSTIC TESTING:  [ ] Echocardiogram:     < from: TTE W or WO Ultrasound Enhancing Agent (04.30.24 @ 14:03) >  CONCLUSIONS:      1. Limited study f/u for LV function.   2. Left ventricular cavity is normal in size. Left ventricular systolic function is normal with an ejection fraction visually estimated at 55 to 60 %.   3. Normal right ventricular cavity size and normal systolic function.   4. Mild to moderate tricuspid regurgitation.   5. No pericardial effusion seen.   6. Estimated pulmonary artery systolic pressure is 60 mmHg, consistent with severe pulmonary hypertension.    < end of copied text >

## 2024-05-04 NOTE — PROGRESS NOTE ADULT - PROBLEM SELECTOR PLAN 2
-EMS reported SBP > 200 upon arrival to ED  -In -151  -SBP goal per neuro is 130-150  -Hydral added 5/3. C/w valsartan  -Now 147-165

## 2024-05-04 NOTE — CHART NOTE - NSCHARTNOTEFT_GEN_A_CORE
Pt re-examined at 17:00   Laying in bed awake and alert denies pain    Neurologic: A & O x 3, no gross focal deficits.  Cardiovascular: irregular S1 S2, No murmur, no rubs/gallops. No JVD  Respiratory: Lungs clear to auscultation, unlabored   Gastrointestinal:  Soft, Non-tender, + BS  Lower Extremities: 2+ Peripheral Pulses, no  edema CMS WNL   Psychiatry: Patient is calm. No agitation.   Skin: warm and dry.        Plan:   Pt received total of 1850 ml NS   Repeat labs pending   Will have night PA follow   CBC for am with diff orderd

## 2024-05-05 LAB
ACANTHOCYTES BLD QL SMEAR: SLIGHT — SIGNIFICANT CHANGE UP
ALBUMIN SERPL ELPH-MCNC: 3.4 G/DL — SIGNIFICANT CHANGE UP (ref 3.3–5.2)
ALP SERPL-CCNC: 66 U/L — SIGNIFICANT CHANGE UP (ref 40–120)
ALT FLD-CCNC: 18 U/L — SIGNIFICANT CHANGE UP
ANION GAP SERPL CALC-SCNC: 9 MMOL/L — SIGNIFICANT CHANGE UP (ref 5–17)
APPEARANCE UR: CLEAR — SIGNIFICANT CHANGE UP
AST SERPL-CCNC: 18 U/L — SIGNIFICANT CHANGE UP
BACTERIA # UR AUTO: NEGATIVE /HPF — SIGNIFICANT CHANGE UP
BASOPHILS # BLD AUTO: 0 K/UL — SIGNIFICANT CHANGE UP (ref 0–0.2)
BASOPHILS NFR BLD AUTO: 0 % — SIGNIFICANT CHANGE UP (ref 0–2)
BILIRUB SERPL-MCNC: 0.8 MG/DL — SIGNIFICANT CHANGE UP (ref 0.4–2)
BILIRUB UR-MCNC: NEGATIVE — SIGNIFICANT CHANGE UP
BUN SERPL-MCNC: 21.3 MG/DL — HIGH (ref 8–20)
CALCIUM SERPL-MCNC: 8.1 MG/DL — LOW (ref 8.4–10.5)
CAST: 1 /LPF — SIGNIFICANT CHANGE UP (ref 0–4)
CHLORIDE SERPL-SCNC: 96 MMOL/L — SIGNIFICANT CHANGE UP (ref 96–108)
CHLORIDE UR-SCNC: 113 MMOL/L — SIGNIFICANT CHANGE UP
CO2 SERPL-SCNC: 24 MMOL/L — SIGNIFICANT CHANGE UP (ref 22–29)
COLOR SPEC: YELLOW — SIGNIFICANT CHANGE UP
CREAT SERPL-MCNC: 0.49 MG/DL — LOW (ref 0.5–1.3)
DIFF PNL FLD: NEGATIVE — SIGNIFICANT CHANGE UP
EGFR: 92 ML/MIN/1.73M2 — SIGNIFICANT CHANGE UP
ELLIPTOCYTES BLD QL SMEAR: SIGNIFICANT CHANGE UP
EOSINOPHIL # BLD AUTO: 0 K/UL — SIGNIFICANT CHANGE UP (ref 0–0.5)
EOSINOPHIL NFR BLD AUTO: 0 % — SIGNIFICANT CHANGE UP (ref 0–6)
GLUCOSE SERPL-MCNC: 117 MG/DL — HIGH (ref 70–99)
GLUCOSE UR QL: NEGATIVE MG/DL — SIGNIFICANT CHANGE UP
HCT VFR BLD CALC: 36.3 % — SIGNIFICANT CHANGE UP (ref 34.5–45)
HGB BLD-MCNC: 12.3 G/DL — SIGNIFICANT CHANGE UP (ref 11.5–15.5)
KETONES UR-MCNC: NEGATIVE MG/DL — SIGNIFICANT CHANGE UP
LACTATE SERPL-SCNC: 1.4 MMOL/L — SIGNIFICANT CHANGE UP (ref 0.5–2)
LEUKOCYTE ESTERASE UR-ACNC: ABNORMAL
LYMPHOCYTES # BLD AUTO: 0.89 K/UL — LOW (ref 1–3.3)
LYMPHOCYTES # BLD AUTO: 5.2 % — LOW (ref 13–44)
MAGNESIUM SERPL-MCNC: 2 MG/DL — SIGNIFICANT CHANGE UP (ref 1.6–2.6)
MANUAL SMEAR VERIFICATION: SIGNIFICANT CHANGE UP
MCHC RBC-ENTMCNC: 29.4 PG — SIGNIFICANT CHANGE UP (ref 27–34)
MCHC RBC-ENTMCNC: 33.9 GM/DL — SIGNIFICANT CHANGE UP (ref 32–36)
MCV RBC AUTO: 86.8 FL — SIGNIFICANT CHANGE UP (ref 80–100)
MONOCYTES # BLD AUTO: 1.2 K/UL — HIGH (ref 0–0.9)
MONOCYTES NFR BLD AUTO: 7 % — SIGNIFICANT CHANGE UP (ref 2–14)
NEUTROPHILS # BLD AUTO: 15.08 K/UL — HIGH (ref 1.8–7.4)
NEUTROPHILS NFR BLD AUTO: 87.8 % — HIGH (ref 43–77)
NITRITE UR-MCNC: NEGATIVE — SIGNIFICANT CHANGE UP
OSMOLALITY UR: 415 MOSM/KG — SIGNIFICANT CHANGE UP (ref 300–1000)
OVALOCYTES BLD QL SMEAR: SIGNIFICANT CHANGE UP
PH UR: 7 — SIGNIFICANT CHANGE UP (ref 5–8)
PLAT MORPH BLD: NORMAL — SIGNIFICANT CHANGE UP
PLATELET # BLD AUTO: 304 K/UL — SIGNIFICANT CHANGE UP (ref 150–400)
POIKILOCYTOSIS BLD QL AUTO: SIGNIFICANT CHANGE UP
POTASSIUM SERPL-MCNC: 4 MMOL/L — SIGNIFICANT CHANGE UP (ref 3.5–5.3)
POTASSIUM SERPL-SCNC: 4 MMOL/L — SIGNIFICANT CHANGE UP (ref 3.5–5.3)
POTASSIUM UR-SCNC: 24 MMOL/L — SIGNIFICANT CHANGE UP
PROT SERPL-MCNC: 5.6 G/DL — LOW (ref 6.6–8.7)
PROT UR-MCNC: NEGATIVE MG/DL — SIGNIFICANT CHANGE UP
RBC # BLD: 4.18 M/UL — SIGNIFICANT CHANGE UP (ref 3.8–5.2)
RBC # FLD: 13.8 % — SIGNIFICANT CHANGE UP (ref 10.3–14.5)
RBC BLD AUTO: ABNORMAL
RBC CASTS # UR COMP ASSIST: 2 /HPF — SIGNIFICANT CHANGE UP (ref 0–4)
SODIUM SERPL-SCNC: 129 MMOL/L — LOW (ref 135–145)
SODIUM UR-SCNC: 121 MMOL/L — SIGNIFICANT CHANGE UP
SP GR SPEC: 1.02 — SIGNIFICANT CHANGE UP (ref 1–1.03)
SQUAMOUS # UR AUTO: 0 /HPF — SIGNIFICANT CHANGE UP (ref 0–5)
UROBILINOGEN FLD QL: 0.2 MG/DL — SIGNIFICANT CHANGE UP (ref 0.2–1)
WBC # BLD: 17.18 K/UL — HIGH (ref 3.8–10.5)
WBC # FLD AUTO: 17.18 K/UL — HIGH (ref 3.8–10.5)
WBC UR QL: 129 /HPF — HIGH (ref 0–5)

## 2024-05-05 PROCEDURE — 99232 SBSQ HOSP IP/OBS MODERATE 35: CPT

## 2024-05-05 PROCEDURE — 70553 MRI BRAIN STEM W/O & W/DYE: CPT | Mod: 26

## 2024-05-05 PROCEDURE — 99223 1ST HOSP IP/OBS HIGH 75: CPT

## 2024-05-05 RX ORDER — METOPROLOL TARTRATE 50 MG
25 TABLET ORAL ONCE
Refills: 0 | Status: COMPLETED | OUTPATIENT
Start: 2024-05-05 | End: 2024-05-05

## 2024-05-05 RX ADMIN — Medication 75 MILLIGRAM(S): at 06:37

## 2024-05-05 RX ADMIN — PIPERACILLIN AND TAZOBACTAM 25 GRAM(S): 4; .5 INJECTION, POWDER, LYOPHILIZED, FOR SOLUTION INTRAVENOUS at 06:37

## 2024-05-05 RX ADMIN — Medication 25 MILLIGRAM(S): at 13:27

## 2024-05-05 RX ADMIN — SODIUM CHLORIDE 80 MILLILITER(S): 9 INJECTION INTRAMUSCULAR; INTRAVENOUS; SUBCUTANEOUS at 19:10

## 2024-05-05 RX ADMIN — Medication 10 MILLIGRAM(S): at 02:26

## 2024-05-05 RX ADMIN — Medication 75 MICROGRAM(S): at 06:37

## 2024-05-05 RX ADMIN — PIPERACILLIN AND TAZOBACTAM 25 GRAM(S): 4; .5 INJECTION, POWDER, LYOPHILIZED, FOR SOLUTION INTRAVENOUS at 13:28

## 2024-05-05 RX ADMIN — SODIUM CHLORIDE 80 MILLILITER(S): 9 INJECTION INTRAMUSCULAR; INTRAVENOUS; SUBCUTANEOUS at 11:12

## 2024-05-05 RX ADMIN — Medication 75 MILLIGRAM(S): at 13:28

## 2024-05-05 RX ADMIN — VALSARTAN 80 MILLIGRAM(S): 80 TABLET ORAL at 21:16

## 2024-05-05 RX ADMIN — ANASTROZOLE 1 MILLIGRAM(S): 1 TABLET ORAL at 13:27

## 2024-05-05 RX ADMIN — Medication 75 MILLIGRAM(S): at 21:16

## 2024-05-05 RX ADMIN — VALSARTAN 80 MILLIGRAM(S): 80 TABLET ORAL at 06:38

## 2024-05-05 RX ADMIN — Medication 10 MILLIGRAM(S): at 22:32

## 2024-05-05 RX ADMIN — HEPARIN SODIUM 5000 UNIT(S): 5000 INJECTION INTRAVENOUS; SUBCUTANEOUS at 13:28

## 2024-05-05 RX ADMIN — Medication 10 MILLIGRAM(S): at 10:49

## 2024-05-05 RX ADMIN — LATANOPROST 1 DROP(S): 0.05 SOLUTION/ DROPS OPHTHALMIC; TOPICAL at 22:16

## 2024-05-05 RX ADMIN — HEPARIN SODIUM 5000 UNIT(S): 5000 INJECTION INTRAVENOUS; SUBCUTANEOUS at 21:16

## 2024-05-05 RX ADMIN — HEPARIN SODIUM 5000 UNIT(S): 5000 INJECTION INTRAVENOUS; SUBCUTANEOUS at 06:38

## 2024-05-05 RX ADMIN — PIPERACILLIN AND TAZOBACTAM 25 GRAM(S): 4; .5 INJECTION, POWDER, LYOPHILIZED, FOR SOLUTION INTRAVENOUS at 21:17

## 2024-05-05 RX ADMIN — Medication 25 MILLIGRAM(S): at 06:38

## 2024-05-05 RX ADMIN — PANTOPRAZOLE SODIUM 40 MILLIGRAM(S): 20 TABLET, DELAYED RELEASE ORAL at 06:38

## 2024-05-05 RX ADMIN — Medication 25 MILLIGRAM(S): at 21:16

## 2024-05-05 RX ADMIN — Medication 25 MILLIGRAM(S): at 17:29

## 2024-05-05 RX ADMIN — VALSARTAN 80 MILLIGRAM(S): 80 TABLET ORAL at 13:38

## 2024-05-05 RX ADMIN — CEFTRIAXONE 1000 MILLIGRAM(S): 500 INJECTION, POWDER, FOR SOLUTION INTRAMUSCULAR; INTRAVENOUS at 02:26

## 2024-05-05 NOTE — CONSULT NOTE ADULT - SUBJECTIVE AND OBJECTIVE BOX
INFECTIOUS DISEASES AND INTERNAL MEDICINE at [place]  =======================================================  Tim Huertas MD  Diplomates American Board of Internal Medicine and Infectious Diseases  Telephone [phone]  Fax            [phone]  =======================================================    JUMA [id number]      HPI:   84 y/o female with PMHx HTN and A Fib (started taking  Eliquisx1 week ago, last dose  AM) BIBA from home due to headache.   According to EMS< BP around 206 systolic.   pt   was unable to recall other bp meds. pt denies cp, sob, visual changes, slurred speech, fall/head injury, numbness/weakness, or any other complaints. ICH 3  AS ABOVE PT TRANSFRRED FORM [facility]   WITH IVH SEEN [facility]Y NEUROSURGERY ICU  PT NOW TRANSFERRED TO [address] [place] [address] HAD ELEVATED WBC COUNT CONCERN FOR INFECTION  ASKED TO EVALUATE FROM ID STANDPOINT            PAST MEDICAL & SURGICAL HISTORY:  HTN (hypertension)      HLD (hyperlipidemia)          ANTIBIOTICS  piperacillin/tazobactam IVPB.. 3.375 Gram(s) IV Intermittent every 8 hours      Allergies    No Known Allergies    Intolerances        SOCIAL HISTORY:     FAMILY HX   FAMILY HISTORY:      Vital Signs Last 24 Hrs  T(C): 36.5 (05 May 2024 07:34), Max: 37.9 (05 May 2024 00:00)  T(F): 97.7 (05 May 2024 07:34), Max: 100.2 (05 May 2024 00:00)  HR: 102 (05 May 2024 08:00) (93 - 129)  BP: 141/126 (05 May 2024 08:00) (123/111 - 167/109)  BP(mean): 130 (05 May 2024 08:00) (89 - 130)  RR: 15 (05 May 2024 08:00) (15 - 19)  SpO2: 97% (05 May 2024 08:00) (96% - 100%)    Parameters below as of 05 May 2024 08:00  Patient On (Oxygen Delivery Method): room air      Drug Dosing Weight  Height (cm): 165.1 (2024 00:36)  Weight (kg): 61.6 (2024 00:36)  BMI (kg/m2): 22.6 (2024 00:36)  BSA (m2): 1.68 (2024 00:36)      REVIEW OF SYSTEMS:    UNABLE TO OBTAIN            PHYSICAL EXAMINATION:    GENERAL: The patient is a _____in no apparent distress. oob to chair ___     VITAL SIGNS: T(C): 36.5 (24 @ 07:34), Max: 37.9 (24 @ 00:00)  HR: 102 (24 @ 08:00) (93 - 129)  BP: 141/126 (24 @ 08:00) (123/111 - 167/109)  RR: 15 (24 @ 08:00) (15 - 19)  SpO2: 97% (24 @ 08:00) (96% - 100%)  Wt(kg): --    HEENT: Head is normocephalic and atraumatic.  ANICTERIC  NECK: Supple. No carotid bruits.  No lymphadenopathy or thyromegaly.    LUNGS:COARSE BREATH SOUNDS    HEART: Regular rate and rhythm without murmur.    ABDOMEN: Soft, nontender, and nondistended.  Positive bowel sounds.  No hepatosplenomegaly was noted. NO REBOUND NO GUARDING    EXTREMITIES: NO EDEMA NO ERYTHEMA    NEUROLOGIC:  AWAKE ANSWERS SIMPLE QUESTIONS LETHARGIC      SKIN: No ulceration or induration present. NO RASH        BLOOD CULTURES       URINE CX          LABS:                        13.1   25.36 )-----------( 308      ( 04 May 2024 02:25 )             37.0     05-04    126<L>  |  87<L>  |  37.0<H>  ----------------------------<  145<H>  3.8   |  25.0  |  0.59    Ca    9.0      04 May 2024 12:08  Phos  2.8     05-04  Mg     1.7     05-04        Urinalysis Basic - ( 05 May 2024 08:32 )    Color: Yellow / Appearance: Clear / S.020 / pH: x  Gluc: x / Ketone: Negative mg/dL  / Bili: Negative / Urobili: 0.2 mg/dL   Blood: x / Protein: Negative mg/dL / Nitrite: Negative   Leuk Esterase: Large / RBC: 2 /HPF /  /HPF   Sq Epi: x / Non Sq Epi: 0 /HPF / Bacteria: Negative /HPF        RADIOLOGY & ADDITIONAL STUDIES:      ASSESSMENT/PLAN  84 y/o female with PMHx HTN and A Fib (started taking  Eliquisx1 week ago, last dose  AM) BIBA from home due to headache.   According to EMS< BP around 206 systolic.   pt   was unable to recall other bp meds. pt denies cp, sob, visual changes, slurred speech, fall/head injury, numbness/weakness, or any other complaints. ICH 3  AS ABOVE PT TRANSFERRED FORM [facility]   WITH IVH   SEEN BY NEUROSURGERY ICU  PT NOW TRANSFERRED TO  AND  HAD ELEVATED WBC COUNT CONCERN FOR INFECTION  PT AWAKE OOB TO CHAIR ANSWERS QUESTION IS BUT LETHARGIC   BLOOD CX AND URINE CX PENDING  CONTINUE CURRENT EMPIRC ABX  WILL FOLLOW UP BLOOD AN URINE CX  WILL FOLLOW UP WITH FURTHER RECOMMENDATIONS                DENA ZAMARRIPA MD

## 2024-05-05 NOTE — PROGRESS NOTE ADULT - SUBJECTIVE AND OBJECTIVE BOX
NEPHROLOGY INTERVAL HPI/OVERNIGHT EVENTS:    No new events.    MEDICATIONS  (STANDING):  anastrozole 1 milliGRAM(s) Oral daily  cefTRIAXone Injectable.      heparin   Injectable 5000 Unit(s) SubCutaneous every 8 hours  hydrALAZINE 25 milliGRAM(s) Oral three times a day  influenza  Vaccine (HIGH DOSE) 0.7 milliLiter(s) IntraMuscular once  latanoprost 0.005% Ophthalmic Solution 1 Drop(s) Both EYES at bedtime  levothyroxine 75 MICROGram(s) Oral daily  metoprolol tartrate 75 milliGRAM(s) Oral every 8 hours  pantoprazole    Tablet 40 milliGRAM(s) Oral before breakfast  piperacillin/tazobactam IVPB.- 3.375 Gram(s) IV Intermittent once  sodium chloride 0.9%. 1000 milliLiter(s) (100 mL/Hr) IV Continuous <Continuous>  valsartan 80 milliGRAM(s) Oral <User Schedule>    MEDICATIONS  (PRN):  acetaminophen     Tablet .. 650 milliGRAM(s) Oral every 6 hours PRN Mild Pain (1 - 3)  hydrALAZINE Injectable 10 milliGRAM(s) IV Push every 2 hours PRN SBP> 150 and DBP> 90  labetalol Injectable 10 milliGRAM(s) IV Push every 2 hours PRN SBP> 150 and DBP> 90  senna 2 Tablet(s) Oral at bedtime PRN Constipation      Allergies    No Known Allergies          Vital Signs Last 24 Hrs  T(C): 36.5 (05 May 2024 07:34), Max: 37.9 (05 May 2024 00:00)  T(F): 97.7 (05 May 2024 07:34), Max: 100.2 (05 May 2024 00:00)  HR: 94 (05 May 2024 10:00) (93 - 129)  BP: 159/92 (05 May 2024 10:00) (123/111 - 167/109)  BP(mean): 109 (05 May 2024 10:00) (89 - 130)  RR: 15 (05 May 2024 10:00) (15 - 19)  SpO2: 98% (05 May 2024 10:00) (96% - 100%)    Parameters below as of 05 May 2024 10:00  Patient On (Oxygen Delivery Method): room air      T(C): 36.4 (04 May 2024 15:46), Max: 37.2 (04 May 2024 07:26)  T(F): 97.6 (04 May 2024 15:46), Max: 99 (04 May 2024 07:26)  HR: 119 (04 May 2024 18:00) (94 - 126)  BP: 148/96 (04 May 2024 18:00) (123/111 - 159/107)  BP(mean): 113 (04 May 2024 18:00) (89 - 121)  RR: 19 (04 May 2024 18:00) (16 - 19)  SpO2: 99% (04 May 2024 18:00) (93% - 100%)    Parameters below as of 04 May 2024 18:00  Patient On (Oxygen Delivery Method): room air         PHYSICAL EXAM:    GENERAL: comfortable in bed  HEAD:  wnl  EYES:   ENMT:   NECK: veins flat in bed  NERVOUS SYSTEM:  more sleepy  today  CHEST/LUNG: alert, oriented  HEART: A. Fib on monitor  ABDOMEN:  NT   EXTREMITIES: legs  same   LYMPH:   SKIN: no rash  : Neg    LABS:    05-05    129<L>  |  96  |  21.3<H>  ----------------------------<  117<H>  4.0   |  24.0  |  0.49<L>    Ca    8.1<L>      05 May 2024 09:10  Phos  2.8     05-04  Mg     2.0     05-05    TPro  5.6<L>  /  Alb  3.4  /  TBili  0.8  /  DBili  x   /  AST  18  /  ALT  18  /  AlkPhos  66  05-05                          13.1   25.36 )-----------( 308      ( 04 May 2024 02:25 )             37.0     05-04    126<L>  |  87<L>  |  37.0<H>  ----------------------------<  145<H>  3.8   |  25.0  |  0.59    Ca    9.0      04 May 2024 12:08  Phos  2.8     05-04  Mg     1.7     05-04        Urinalysis Basic - ( 04 May 2024 12:08 )    Color: x / Appearance: x / SG: x / pH: x  Gluc: 145 mg/dL / Ketone: x  / Bili: x / Urobili: x   Blood: x / Protein: x / Nitrite: x   Leuk Esterase: x / RBC: x / WBC x   Sq Epi: x / Non Sq Epi: x / Bacteria: x      Magnesium: 1.7 mg/dL (05-04 @ 02:25)  Phosphorus: 2.8 mg/dL (05-04 @ 02:25)          RADIOLOGY & ADDITIONAL TESTS:

## 2024-05-05 NOTE — PROGRESS NOTE ADULT - ASSESSMENT
84 y/o female with PMHx HTN, Breast Ca (dx 2020, surgery, no chemo/radiation) and A Fib (started taking  Eliquis 1 week ago, last dose 4/28 AM) BIBA to [facility]  from home due to headache. Patient had recently been admitted due to HTN after running out of her coreg. CT head found with intraventricular bleed and was transferred to [facility] ED for further evaluation. Pt was initially admitted to NICU team then dowgrade to step down under neurology team. She was given Andexxa for Eliquis reversal and BP was controlled with nicardipine for SBP > 200s. Course was complicated by Afib rvr, hyponatremia and uti w/leukocytosis .Pt was started IV Rocephin today. Repeat CTH on 05/03 stable.TTE ef 55-60%,mild to mod TR.Cardiology and nephrology are foloowing. Pt will be transfer to medicine team from neurology team.   Of note patient endorses that she had fallen and hit her head a couple of days before initiation of Eliquis but did not communicate to team at that time.     -Here with intraventricular hemorrhage 2/2 trauma. AC on hold for at least 14 days per neuro. Pend MR head.       IVH/IPH  Suspect secondary to HTN emergency with underlying medication induced coagulopathy from Eliquis,S/P recent fall   Hypertensive urgency  -s/p cardene drip  -neurocheck  -repeat cth stable   -Pending MR Brain  -CTA brain/neck stable  -SBP goal per neuro is 130-150  -continue hydralazine,metoprolol, valsartan  -Monitor BP  -F/U neurology rec    Hyponatremia  - started on sodium bicarb TID and urea BID,   - placed on 800mL/day fluid restriction per nephrology  - f/u Nephrology rec  -F/U BMP      UTI  -UA repeated w/ straight cath'd sample, concern for UTI,   -started on Rocephin.       Afib rvr.    -Recent diagnosis  - Not rate controlled  - s/p  IV lopressor  -cardiology is adjusting lopressor dose ,increased to 75 mg q8hr w/prn iv lopressor  -TSH normal  -TTE reviewed  -Hold AC for ICH  -F/U cardiology rec  -Outpatient [facility] evaluation    HX Breast cancer   -dx 2020, surgery, no chemo/radiation  -on Anastrozole    Hypothyroidism  -on synthroid  -tsh stable    dvt ppx sq heparin  gi ppx PPI    Plan of care dw pt  dw Neurology team  TEJAS RN       84 y/o female with PMHx HTN, Breast Ca (dx 2020, surgery, no chemo/radiation) and A Fib (started taking  Eliquis 1 week ago, last dose 4/28 AM) BIBA to [facility]  from home due to headache. Patient had recently been admitted due to HTN after running out of her coreg. CT head found with intraventricular bleed and was transferred to [facility] ED for further evaluation. Pt was initially admitted to NICU team then downgraded to step down under neurology team. She was given Andexxa for Eliquis reversal and BP was controlled with nicardipine for SBP > 200s. Course was complicated by Afib rvr, hyponatremia and uti w/leukocytosis .Pt was started IV Rocephin today. Repeat CTH on 05/03 stable. TTE ef 55-60%,mild to mod TR. Cardiology and nephrology are following Pt will be transfer to medicine team from neurology team.   Of note patient endorses that she had fallen and hit her head a couple of days before initiation of Eliquis but did not communicate to team at that time.  AC on hold for at least 14 days per neuro. Pend MRI  brain pending    IVH/IPH  Suspect secondary to HTN emergency with underlying medication induced coagulopathy from Eliquis,S/P recent fall   Hypertensive urgency  -s/p cardene drip  -neurocheck  -repeat cth stable   -Pending MR Brain  -CTA brain/neck  no carotid stenosis, no large vessel occlusion   -SBP goal per neuro is 130-150  -continue hydralazine,metoprolol, valsartan  -Monitor BP  -F/U neurology rec  - Echo 55-60% mild Tr  - Mri brain pending      Hyponatremia  - started on sodium bicarb TID and urea BID,   - placed on 800mL/day fluid restriction per nephrology  - f/u Nephrology rec  - Slight increase in sodium today ( 129)      UTI e coli  -UA repeated w/ straight cath'd sample, concern for UTI,   -  Patient's antibiotics was changed to zosyn 3.375g q 8  - follow urine culture and blood culture    Sepsis   - lactate is elevated. Trend lactic acid  - continue zosyn   - follow blood culture  and urine culture  - CXr- wet read - no infiltrate  - give normal saline @ 100cc/ hour  - Id consult appreciated      Afib rvr.    -Recent diagnosis  - Not rate controlled  - s/p  IV lopressor  -cardiology is adjusting lopressor dose ,increased to 75 mg q8hr w/prn iv lopressor  -TSH normal  - [id number]  -TTE reviewed  -Hold AC for ICH  -F/U cardiology rec  -Outpatient [facility] evaluation    HX Breast cancer   -dx 2020, surgery, no chemo/radiation  -on Anastrozole    Hypothyroidism  -c/w synthroid  -tsh stable    dvt ppx sq heparin  gi ppx PPI

## 2024-05-05 NOTE — PROGRESS NOTE ADULT - SUBJECTIVE AND OBJECTIVE BOX
[facility]                                                         CARDIOLOGY AT [address] [address]                                                         Telephone: [phone]. Fax:[phone]                                                                             PROGRESS NOTE    Reason for follow up: afib RVR, HTN urgency  Update: SBP from 130-160s. HR 90-120s. UA +, urine culture in lab.     Review of symptoms:   Cardiac:  No chest pain. No dyspnea. No palpitations.  Respiratory: no cough. No dyspnea  Gastrointestinal: No diarrhea. No abdominal pain. No bleeding.   Neuro: No focal neuro complaints.    Vitals:  T(C): 36.5 (05-05-24 @ 07:34), Max: 37.9 (05-05-24 @ 00:00)  HR: 102 (05-05-24 @ 08:00) (93 - 129)  BP: 141/126 (05-05-24 @ 08:00) (123/111 - 167/109)  RR: 15 (05-05-24 @ 08:00) (15 - 19)  SpO2: 97% (05-05-24 @ 08:00) (96% - 100%)  Wt(kg): --  I&O's Summary    04 May 2024 07:01  -  05 May 2024 07:00  --------------------------------------------------------  IN: 3480 mL / OUT: 2101 mL / NET: 1379 mL    05 May 2024 07:01  -  05 May 2024 09:22  --------------------------------------------------------  IN: 0 mL / OUT: 400 mL / NET: -400 mL          PHYSICAL EXAM:  Appearance: Comfortable. No acute distress  HEENT:  Atraumatic. Normocephalic.  Normal oral mucosa  Neurologic: A & O x 3, no gross focal deficits.  Cardiovascular: irregularly irregular. No murmur, no rubs/gallops. No JVD  Respiratory: Lungs clear to auscultation, unlabored   Gastrointestinal:  Soft, Non-tender, + BS  Lower Extremities: 2+ Peripheral Pulses, No clubbing, cyanosis, or edema  Psychiatry: Patient is calm. No agitation.   Skin: warm and dry.    CURRENT CARDIAC MEDICATIONS:  hydrALAZINE 25 milliGRAM(s) Oral three times a day  hydrALAZINE Injectable 10 milliGRAM(s) IV Push every 2 hours PRN  labetalol Injectable 10 milliGRAM(s) IV Push every 2 hours PRN  metoprolol tartrate 75 milliGRAM(s) Oral every 8 hours  valsartan 80 milliGRAM(s) Oral <User Schedule>      CURRENT OTHER MEDICATIONS:  piperacillin/tazobactam IVPB.. 3.375 Gram(s) IV Intermittent every 8 hours  acetaminophen     Tablet .. 650 milliGRAM(s) Oral every 6 hours PRN Mild Pain (1 - 3)  pantoprazole    Tablet 40 milliGRAM(s) Oral before breakfast  senna 2 Tablet(s) Oral at bedtime PRN Constipation  levothyroxine 75 MICROGram(s) Oral daily  anastrozole 1 milliGRAM(s) Oral daily  heparin   Injectable 5000 Unit(s) SubCutaneous every 8 hours  influenza  Vaccine (HIGH DOSE) 0.7 milliLiter(s) IntraMuscular once  latanoprost 0.005% Ophthalmic Solution 1 Drop(s) Both EYES at bedtime  sodium chloride 0.9%. 1000 milliLiter(s) (100 mL/Hr) IV Continuous <Continuous>      LABS:	 	             13.1   25.36 )-----------( 308      ( 04 May 2024 02:25 )             37.0     05-04    126<L>  |  87<L>  |  37.0<H>  ----------------------------<  145<H>  3.8   |  25.0  |  0.59    Ca    9.0      04 May 2024 12:08  Phos  2.8     05-04  Mg     1.7     05-04      PT/INR/PTT ( 29 Apr 2024 06:00 )                       :                       :      13.8         :       27.9                  .        .                   .              .           .       1.25        .                                       Lipid Profile: Date: 04-29 @ 06:00  Total cholesterol 151; Direct LDL: --; HDL: 79; Triglycerides:62    HgA1c: 5.3%   TSH: Thyroid Stimulating Hormone, Serum: 1.24 uIU/mL      TELEMETRY: Afib   ECG: Sinus bradycardia 50s     DIAGNOSTIC TESTING:  [ x] Echocardiogram: < from: TTE W or WO Ultrasound Enhancing Agent (04.30.24 @ 14:03) >  < from: TTE W or WO Ultrasound Enhancing Agent (04.30.24 @ 14:03) >      1. Limited study f/u for LV function.   2. Left ventricular cavity is normal in size. Left ventricular systolic function is normal with an ejection fraction visually estimated at 55 to 60 %.   3. Normal right ventricular cavity size and normal systolic function.   4. Mild to moderate tricuspid regurgitation.   5. No pericardial effusion seen.   6. Estimated pulmonary artery systolic pressure is 60 mmHg, consistent with severe pulmonary hypertension.    < end of copied text >    [ ]  Catheterization:  [ ] Stress Test:    OTHER:

## 2024-05-05 NOTE — PROGRESS NOTE ADULT - PROBLEM SELECTOR PLAN 2
- EMS reported SBP > 200 upon arrival to ED  - In -151  - SBP goal per neuro is 130-150  - BP goal per neuro < 150/90  - c/w hydralazine 25 mg TID PO, may increase dose as tolerated.   - Now 120s-160s.

## 2024-05-05 NOTE — PROGRESS NOTE ADULT - ASSESSMENT
82 y/o female with PMHx HTN, Breast Ca (dx 2020, surgery, no chemo/radiation) and A Fib (started taking  Eliquisx1 week ago, last dose 4/28 AM) BIBA to [facility]  from home due to headache. Patient had recently been admitted due to HTN after running out of her coreg. CT head found with intraventricular bleed and was transferred to [facility] ED for further evaluation. She was given Andexxa for Eliquis reversal and BP was controlled with nicardipine for SBP > 200s. She recently lost her  and has not been compliant with medications. She is now weaned off nicardipine. She denies chest pain, back pain, SOB, LOVELACE, diaphoresis, syncope or N/V. Of note patient endorses that she had fallen and hit her head a couple of days before initiation of Eliquis but did not communicate to team at that time.

## 2024-05-05 NOTE — PROGRESS NOTE ADULT - PROBLEM SELECTOR PLAN 1
- Recent diagnosis last week  - Not rate controlled HR between 90s-120s   - TSH normal  - c/w metoprolol 75 mg q8h for rate control.   - UA positive, will hold off on increasing BB dose/frequency. May be related to urinary tract infection and/or dehydration  - EF preserved  - [id number]   -Here with intraventricular hemorrhage 2/2 trauma. AC on hold for at least 14 days per neuro. Pend MR head.   -Outpatient [facility] evaluation - Recent diagnosis last week  - Not rate controlled HR between 90s-120s   - TSH normal  - c/w metoprolol 75 mg q8h for rate control. will give x1 additional metoprolol 25 mg po now and re-assess tomorrow If still tachycardic.   - UA positive, will hold off on increasing BB dose/frequency. May be related to urinary tract infection and/or dehydration  - EF preserved  - [id number]   -Here with intraventricular hemorrhage 2/2 trauma. AC on hold for at least 14 days per neuro. Pend MR head.   -Outpatient [facility] evaluation

## 2024-05-05 NOTE — PROGRESS NOTE ADULT - ASSESSMENT
A) Hyponatremia with salt  losing and  CNS bleed,  slowly  better, UTI. A. Fib.    P) IV Fluid  adjusted, iv  antibiotic, follow up labs.

## 2024-05-05 NOTE — PROGRESS NOTE ADULT - SUBJECTIVE AND OBJECTIVE BOX
[address]    Name:  Epifanio Cabot  MR#:  [id number]  :  1934  ACCOUNT #:  [de-identified]  DATE OF SERVICE:  2021    CHIEF COMPLAINT:  An 78-year-old woman with multiple metastatic brain lesions. HISTORY OF PRESENT ILLNESS:  Seen in the hospital yesterday, referred initially to my partner, Dr. Courtney Simms, and the patient was referred to the emergency room at [facility], where she has been in the ER for a better part of the day before she was transferred up to the regular floor today. Denies any headache, nausea, vomiting. She had a little confusion, some balance problems. Family encouraged her to go get some imaging studies that shows multiple lesions, all less than 1.5 cm in the brain, that enhanced with contrast.    PAST MEDICAL HISTORY:  She has a history of hypertension, hypercholesterolemia, and a stroke in the past which left her with minimal deficit. PAST SURGICAL HISTORY:  She has had a colonoscopy, but no other surgery. FAMILY HISTORY:  Unremarkable. SOCIAL HISTORY:  She is a former smoker, stopped smoking in . She smoked half a pack a day for 20 years. No alcohol use. MEDICATIONS:  Prinivil, Zofran, Colace, Apresoline. At home, she was taking Dulcolax, Lipitor, Plavix, Tenormin, Prinivil, multivitamins. ALLERGIES:  SHE IS ALLERGIC TO STATINS. PHYSICAL EXAMINATION:  VITAL SIGNS:  Blood pressure was 129/91 on admission. GENERAL:  She is awake, alert, oriented to person, place, and time. NEUROLOGIC:  Cranial nerve function II through XII normal.  Visual fields are full. Finger-to-nose testing appears normal.  Gait and station are deferred. No focal motor or sensory deficits in upper or lower extremities. IMAGING:  The MRI shows multiple lesions within the brain, none of which seen to be greater than 1 to 1.5 cm in diameter. There is no mass effect from them, no hydrocephalous.     IMPRESSION:  At this point, she has not really had a metastatic workup. I will defer to the hospitalist admitting her. I will be away next week. If biopsy is needed for tissue diagnosis, I can defer to one of my partners; otherwise, I will defer at this time to the primary care team admitting her and wait for the results of the metastatic workup. Thank you for seeing for asking me to see the patient.       Heavenly Russo MD      [id number]  D:  07/03/2021 15:56  T:  07/03/2021 19:17  JOB #:  [id number]  CC:  Gabby Gleason MD [facility] Division of Hospital Medicine    Chief Complaint:      SUBJECTIVE / OVERNIGHT EVENTS:    Patient denies chest pain, SOB, abd pain, N/V, fever, chills, dysuria or any other complaints. All remainder ROS negative.     MEDICATIONS  (STANDING):  anastrozole 1 milliGRAM(s) Oral daily  heparin   Injectable 5000 Unit(s) SubCutaneous every 8 hours  hydrALAZINE 25 milliGRAM(s) Oral three times a day  influenza  Vaccine (HIGH DOSE) 0.7 milliLiter(s) IntraMuscular once  latanoprost 0.005% Ophthalmic Solution 1 Drop(s) Both EYES at bedtime  levothyroxine 75 MICROGram(s) Oral daily  metoprolol tartrate 75 milliGRAM(s) Oral every 8 hours  metoprolol tartrate 25 milliGRAM(s) Oral once  pantoprazole    Tablet 40 milliGRAM(s) Oral before breakfast  piperacillin/tazobactam IVPB.. 3.375 Gram(s) IV Intermittent every 8 hours  sodium chloride 0.9%. 1000 milliLiter(s) (80 mL/Hr) IV Continuous <Continuous>  valsartan 80 milliGRAM(s) Oral <User Schedule>    MEDICATIONS  (PRN):  acetaminophen     Tablet .. 650 milliGRAM(s) Oral every 6 hours PRN Mild Pain (1 - 3)  hydrALAZINE Injectable 10 milliGRAM(s) IV Push every 2 hours PRN SBP> 150 and DBP> 90  labetalol Injectable 10 milliGRAM(s) IV Push every 2 hours PRN SBP> 150 and DBP> 90  senna 2 Tablet(s) Oral at bedtime PRN Constipation        I&O's Summary    04 May 2024 07:01  -  05 May 2024 07:00  --------------------------------------------------------  IN: 3480 mL / OUT: 2101 mL / NET: 1379 mL    05 May 2024 07:01  -  05 May 2024 14:41  --------------------------------------------------------  IN: 0 mL / OUT: 1750 mL / NET: -1750 mL        PHYSICAL EXAM:  Vital Signs Last 24 Hrs  T(C): 37.2 (05 May 2024 12:44), Max: 37.9 (05 May 2024 00:00)  T(F): 98.9 (05 May 2024 12:44), Max: 100.2 (05 May 2024 00:00)  HR: 84 (05 May 2024 14:00) (84 - 129)  BP: 159/93 (05 May 2024 14:00) (126/76 - 167/109)  BP(mean): 113 (05 May 2024 14:00) (89 - 130)  RR: 12 (05 May 2024 14:00) (12 - 19)  SpO2: 98% (05 May 2024 14:00) (96% - 100%)    Parameters below as of 05 May 2024 14:00  Patient On (Oxygen Delivery Method): room air            CONSTITUTIONAL: NAD, well-developed, well-groomed  ENMT: Moist oral mucosa, no pharyngeal injection or exudates; normal dentition  RESPIRATORY: Normal respiratory effort; lungs are clear to auscultation bilaterally  CARDIOVASCULAR: Regular rate and rhythm, normal S1 and S2, no murmur/rub/gallop; No lower extremity edema; Peripheral pulses are 2+ bilaterally  ABDOMEN: Nontender to palpation, normoactive bowel sounds, no rebound/guarding; No hepatosplenomegaly  MUSCLOSKELETAL:  Normal gait; no clubbing or cyanosis of digits; no joint swelling or tenderness to palpation  PSYCH: A+O to person, place, and time; affect appropriate  NEUROLOGY: CN 2-12 are intact and symmetric; no gross sensory deficits;   SKIN: No rashes; no palpable lesions    LABS:                        12.3   17.18 )-----------( 304      ( 05 May 2024 09:10 )             36.3     05-05    129<L>  |  96  |  21.3<H>  ----------------------------<  117<H>  4.0   |  24.0  |  0.49<L>    Ca    8.1<L>      05 May 2024 09:10  Phos  2.8     05-04  Mg     2.0     05-05    TPro  5.6<L>  /  Alb  3.4  /  TBili  0.8  /  DBili  x   /  AST  18  /  ALT  18  /  AlkPhos  66  05-05          Urinalysis Basic - ( 05 May 2024 09:10 )    Color: x / Appearance: x / SG: x / pH: x  Gluc: 117 mg/dL / Ketone: x  / Bili: x / Urobili: x   Blood: x / Protein: x / Nitrite: x   Leuk Esterase: x / RBC: x / WBC x   Sq Epi: x / Non Sq Epi: x / Bacteria: x        Culture - Urine (collected 04 May 2024 03:15)  Source: Clean Catch Clean Catch (Midstream)  Preliminary Report (05 May 2024 10:47):    >100,000 CFU/ml Escherichia coli      CAPILLARY BLOOD GLUCOSE            RADIOLOGY & ADDITIONAL TESTS:  Results Reviewed:   Imaging Personally Reviewed:  Electrocardiogram Personally Reviewed:                                           [facility] Division of Hospital Medicine      82 y/o female with PMHx HTN, Breast Ca (dx 2020, surgery, no chemo/radiation) and A Fib (started taking  Eliquis 1 week ago, last dose 4/28 AM) BIBA to [facility]  from home due to headache. Patient had recently been admitted due to HTN after running out of her coreg. CT head found with intraventricular bleed and was transferred [facility] ED for further evaluation. Pt was initially admitted to NICU team then dowgrade to step down under neurology team. She was given Andexxa for Eliquis reversal and BP was controlled with nicardipine for SBP > 200s. Course was complicated by Afib rvr, hyponatremia and uti w/leukocytosis .Pt was started IV Rocephin today. Repeat CTH on 05/03 stable.TTE ef 55-60%,mild to mod TR.Cardiology and nephrology are foloowing. Pt will be transfer to medicine team from neurology team.   Of note patient endorses that she had fallen and hit her head a couple of days before initiation of Eliquis but did not communicate to team at that time. Mri brain pending    Patient is new to me chart reviewed   Patient seen and examined sitting up in bed, no respiratory distress, nurse present at bedside   Overnight: no new events     Chief Complaint:  Patient denies any head ahce, no dizziness, no chest pain, no shortness of breath nausea, no vomiting, no abdominal pain, no fever, no chills.         MEDICATIONS  (STANDING):  anastrozole 1 milliGRAM(s) Oral daily  heparin   Injectable 5000 Unit(s) SubCutaneous every 8 hours  hydrALAZINE 25 milliGRAM(s) Oral three times a day  influenza  Vaccine (HIGH DOSE) 0.7 milliLiter(s) IntraMuscular once  latanoprost 0.005% Ophthalmic Solution 1 Drop(s) Both EYES at bedtime  levothyroxine 75 MICROGram(s) Oral daily  metoprolol tartrate 75 milliGRAM(s) Oral every 8 hours  metoprolol tartrate 25 milliGRAM(s) Oral once  pantoprazole    Tablet 40 milliGRAM(s) Oral before breakfast  piperacillin/tazobactam IVPB.. 3.375 Gram(s) IV Intermittent every 8 hours  sodium chloride 0.9%. 1000 milliLiter(s) (80 mL/Hr) IV Continuous <Continuous>  valsartan 80 milliGRAM(s) Oral <User Schedule>    MEDICATIONS  (PRN):  acetaminophen     Tablet .. 650 milliGRAM(s) Oral every 6 hours PRN Mild Pain (1 - 3)  hydrALAZINE Injectable 10 milliGRAM(s) IV Push every 2 hours PRN SBP> 150 and DBP> 90  labetalol Injectable 10 milliGRAM(s) IV Push every 2 hours PRN SBP> 150 and DBP> 90  senna 2 Tablet(s) Oral at bedtime PRN Constipation        I&O's Summary    04 May 2024 07:01  -  05 May 2024 07:00  --------------------------------------------------------  IN: 3480 mL / OUT: 2101 mL / NET: 1379 mL    05 May 2024 07:01  -  05 May 2024 14:41  --------------------------------------------------------  IN: 0 mL / OUT: 1750 mL / NET: -1750 mL        PHYSICAL EXAM:  Vital Signs Last 24 Hrs  T(C): 37.2 (05 May 2024 12:44), Max: 37.9 (05 May 2024 00:00)  T(F): 98.9 (05 May 2024 12:44), Max: 100.2 (05 May 2024 00:00)  HR: 84 (05 May 2024 14:00) (84 - 129)  BP: 159/93 (05 May 2024 14:00) (126/76 - 167/109)  BP(mean): 113 (05 May 2024 14:00) (89 - 130)  RR: 12 (05 May 2024 14:00) (12 - 19)  SpO2: 98% (05 May 2024 14:00) (96% - 100%)    Parameters below as of 05 May 2024 14:00  Patient On (Oxygen Delivery Method): room air            CONSTITUTIONAL: NAD, well-developed, well-groomed  HEENMT: Normocephallic atraumatic not pale anicteric, moist oral mucosa,  RESPIRATORY: Normal respiratory effort; lungs are clear to auscultation bilaterally  CARDIOVASCULAR: Regular rate and rhythm, normal S1 and S2, no murmur/rub/gallop; No lower extremity edema;   ABDOMEN: Nontender to palpation, normoactive bowel sounds, no rebound/guarding; No hepatosplenomegaly  MUSCLOSKELETAL:no clubbing or cyanosis of digits;  PSYCH: A+O to person, place, and time; affect appropriate  NEUROLOGY: CN 2-12 are intact and symmetric; no gross sensory deficits; power 5/5 all limbs  SKIN: No rashes; no palpable lesions    LABS:                        12.3   17.18 )-----------( 304      ( 05 May 2024 09:10 )             36.3     05-05    129<L>  |  96  |  21.3<H>  ----------------------------<  117<H>  4.0   |  24.0  |  0.49<L>    Ca    8.1<L>      05 May 2024 09:10  Phos  2.8     05-04  Mg     2.0     05-05    TPro  5.6<L>  /  Alb  3.4  /  TBili  0.8  /  DBili  x   /  AST  18  /  ALT  18  /  AlkPhos  66  05-05          Urinalysis Basic - ( 05 May 2024 09:10 )    Color: x / Appearance: x / SG: x / pH: x  Gluc: 117 mg/dL / Ketone: x  / Bili: x / Urobili: x   Blood: x / Protein: x / Nitrite: x   Leuk Esterase: x / RBC: x / WBC x   Sq Epi: x / Non Sq Epi: x / Bacteria: x        Culture - Urine (collected 04 May 2024 03:15)  Source: Clean Catch Clean Catch (Midstream)  Preliminary Report (05 May 2024 10:47):    >100,000 CFU/ml Escherichia coli      CAPILLARY BLOOD GLUCOSE            RADIOLOGY & ADDITIONAL TESTS:  Results Reviewed:   Imaging Personally Reviewed:  Electrocardiogram Personally Reviewed:

## 2024-05-06 LAB
-  AMOXICILLIN/CLAVULANIC ACID: SIGNIFICANT CHANGE UP
-  AMPICILLIN/SULBACTAM: SIGNIFICANT CHANGE UP
-  AMPICILLIN: SIGNIFICANT CHANGE UP
-  AZTREONAM: SIGNIFICANT CHANGE UP
-  CEFAZOLIN: SIGNIFICANT CHANGE UP
-  CEFEPIME: SIGNIFICANT CHANGE UP
-  CEFOXITIN: SIGNIFICANT CHANGE UP
-  CEFTRIAXONE: SIGNIFICANT CHANGE UP
-  CEFUROXIME: SIGNIFICANT CHANGE UP
-  CIPROFLOXACIN: SIGNIFICANT CHANGE UP
-  ERTAPENEM: SIGNIFICANT CHANGE UP
-  GENTAMICIN: SIGNIFICANT CHANGE UP
-  IMIPENEM: SIGNIFICANT CHANGE UP
-  LEVOFLOXACIN: SIGNIFICANT CHANGE UP
-  MEROPENEM: SIGNIFICANT CHANGE UP
-  NITROFURANTOIN: SIGNIFICANT CHANGE UP
-  PIPERACILLIN/TAZOBACTAM: SIGNIFICANT CHANGE UP
-  TOBRAMYCIN: SIGNIFICANT CHANGE UP
-  TRIMETHOPRIM/SULFAMETHOXAZOLE: SIGNIFICANT CHANGE UP
ALBUMIN SERPL ELPH-MCNC: 3.2 G/DL — LOW (ref 3.3–5.2)
ALP SERPL-CCNC: 57 U/L — SIGNIFICANT CHANGE UP (ref 40–120)
ALT FLD-CCNC: 16 U/L — SIGNIFICANT CHANGE UP
ANION GAP SERPL CALC-SCNC: 10 MMOL/L — SIGNIFICANT CHANGE UP (ref 5–17)
AST SERPL-CCNC: 17 U/L — SIGNIFICANT CHANGE UP
BILIRUB SERPL-MCNC: 0.9 MG/DL — SIGNIFICANT CHANGE UP (ref 0.4–2)
BUN SERPL-MCNC: 9.4 MG/DL — SIGNIFICANT CHANGE UP (ref 8–20)
CALCIUM SERPL-MCNC: 8.1 MG/DL — LOW (ref 8.4–10.5)
CHLORIDE SERPL-SCNC: 95 MMOL/L — LOW (ref 96–108)
CO2 SERPL-SCNC: 22 MMOL/L — SIGNIFICANT CHANGE UP (ref 22–29)
CREAT SERPL-MCNC: 0.37 MG/DL — LOW (ref 0.5–1.3)
CULTURE RESULTS: ABNORMAL
EGFR: 99 ML/MIN/1.73M2 — SIGNIFICANT CHANGE UP
GLUCOSE SERPL-MCNC: 105 MG/DL — HIGH (ref 70–99)
METHOD TYPE: SIGNIFICANT CHANGE UP
ORGANISM # SPEC MICROSCOPIC CNT: ABNORMAL
ORGANISM # SPEC MICROSCOPIC CNT: SIGNIFICANT CHANGE UP
POTASSIUM SERPL-MCNC: 3.5 MMOL/L — SIGNIFICANT CHANGE UP (ref 3.5–5.3)
POTASSIUM SERPL-SCNC: 3.5 MMOL/L — SIGNIFICANT CHANGE UP (ref 3.5–5.3)
PROT SERPL-MCNC: 5.3 G/DL — LOW (ref 6.6–8.7)
SODIUM SERPL-SCNC: 127 MMOL/L — LOW (ref 135–145)
SPECIMEN SOURCE: SIGNIFICANT CHANGE UP

## 2024-05-06 PROCEDURE — 99233 SBSQ HOSP IP/OBS HIGH 50: CPT

## 2024-05-06 PROCEDURE — 99232 SBSQ HOSP IP/OBS MODERATE 35: CPT | Mod: 25

## 2024-05-06 PROCEDURE — 70450 CT HEAD/BRAIN W/O DYE: CPT | Mod: 26

## 2024-05-06 RX ORDER — POTASSIUM CHLORIDE 20 MEQ
20 PACKET (EA) ORAL ONCE
Refills: 0 | Status: COMPLETED | OUTPATIENT
Start: 2024-05-06 | End: 2024-05-06

## 2024-05-06 RX ORDER — UREA 15 G
15 POWDER IN PACKET (EA) ORAL
Refills: 0 | Status: DISCONTINUED | OUTPATIENT
Start: 2024-05-06 | End: 2024-05-13

## 2024-05-06 RX ORDER — POTASSIUM CHLORIDE 20 MEQ
40 PACKET (EA) ORAL ONCE
Refills: 0 | Status: COMPLETED | OUTPATIENT
Start: 2024-05-06 | End: 2024-05-06

## 2024-05-06 RX ORDER — SODIUM CHLORIDE 9 MG/ML
2 INJECTION INTRAMUSCULAR; INTRAVENOUS; SUBCUTANEOUS THREE TIMES A DAY
Refills: 0 | Status: DISCONTINUED | OUTPATIENT
Start: 2024-05-06 | End: 2024-05-13

## 2024-05-06 RX ADMIN — ANASTROZOLE 1 MILLIGRAM(S): 1 TABLET ORAL at 10:36

## 2024-05-06 RX ADMIN — HEPARIN SODIUM 5000 UNIT(S): 5000 INJECTION INTRAVENOUS; SUBCUTANEOUS at 13:10

## 2024-05-06 RX ADMIN — Medication 75 MILLIGRAM(S): at 05:16

## 2024-05-06 RX ADMIN — VALSARTAN 80 MILLIGRAM(S): 80 TABLET ORAL at 05:24

## 2024-05-06 RX ADMIN — VALSARTAN 80 MILLIGRAM(S): 80 TABLET ORAL at 22:12

## 2024-05-06 RX ADMIN — PANTOPRAZOLE SODIUM 40 MILLIGRAM(S): 20 TABLET, DELAYED RELEASE ORAL at 05:15

## 2024-05-06 RX ADMIN — LATANOPROST 1 DROP(S): 0.05 SOLUTION/ DROPS OPHTHALMIC; TOPICAL at 22:36

## 2024-05-06 RX ADMIN — Medication 25 MILLIGRAM(S): at 13:11

## 2024-05-06 RX ADMIN — Medication 10 MILLIGRAM(S): at 00:11

## 2024-05-06 RX ADMIN — SODIUM CHLORIDE 2 GRAM(S): 9 INJECTION INTRAMUSCULAR; INTRAVENOUS; SUBCUTANEOUS at 21:27

## 2024-05-06 RX ADMIN — Medication 25 MILLIGRAM(S): at 05:15

## 2024-05-06 RX ADMIN — Medication 10 MILLIGRAM(S): at 02:11

## 2024-05-06 RX ADMIN — PIPERACILLIN AND TAZOBACTAM 25 GRAM(S): 4; .5 INJECTION, POWDER, LYOPHILIZED, FOR SOLUTION INTRAVENOUS at 05:16

## 2024-05-06 RX ADMIN — Medication 75 MICROGRAM(S): at 05:15

## 2024-05-06 RX ADMIN — Medication 75 MILLIGRAM(S): at 21:27

## 2024-05-06 RX ADMIN — Medication 10 MILLIGRAM(S): at 14:55

## 2024-05-06 RX ADMIN — HEPARIN SODIUM 5000 UNIT(S): 5000 INJECTION INTRAVENOUS; SUBCUTANEOUS at 21:28

## 2024-05-06 RX ADMIN — Medication 20 MILLIEQUIVALENT(S): at 15:02

## 2024-05-06 RX ADMIN — PIPERACILLIN AND TAZOBACTAM 25 GRAM(S): 4; .5 INJECTION, POWDER, LYOPHILIZED, FOR SOLUTION INTRAVENOUS at 13:10

## 2024-05-06 RX ADMIN — PIPERACILLIN AND TAZOBACTAM 25 GRAM(S): 4; .5 INJECTION, POWDER, LYOPHILIZED, FOR SOLUTION INTRAVENOUS at 21:28

## 2024-05-06 RX ADMIN — Medication 10 MILLIGRAM(S): at 10:35

## 2024-05-06 RX ADMIN — SODIUM CHLORIDE 2 GRAM(S): 9 INJECTION INTRAMUSCULAR; INTRAVENOUS; SUBCUTANEOUS at 15:00

## 2024-05-06 RX ADMIN — Medication 40 MILLIEQUIVALENT(S): at 13:11

## 2024-05-06 RX ADMIN — HEPARIN SODIUM 5000 UNIT(S): 5000 INJECTION INTRAVENOUS; SUBCUTANEOUS at 05:16

## 2024-05-06 RX ADMIN — Medication 25 MILLIGRAM(S): at 21:28

## 2024-05-06 RX ADMIN — Medication 75 MILLIGRAM(S): at 13:10

## 2024-05-06 RX ADMIN — Medication 15 GRAM(S): at 16:52

## 2024-05-06 RX ADMIN — VALSARTAN 80 MILLIGRAM(S): 80 TABLET ORAL at 13:11

## 2024-05-06 NOTE — PROGRESS NOTE ADULT - SUBJECTIVE AND OBJECTIVE BOX
[facility]                                                         CARDIOLOGY AT [address] [place] [address]                                                         Telephone: [phone]. Fax:[phone]                                                                             PROGRESS NOTE    Reason for follow up: afib RVR, HTN urgency  Update: Rates 100s and below       Review of symptoms:   Cardiac:  No chest pain. No dyspnea. No palpitations.  Respiratory: no cough. No dyspnea  Gastrointestinal: No diarrhea. No abdominal pain. No bleeding.   Neuro: No focal neuro complaints.    Vitals:  T(C): 36.7 (05-06-24 @ 07:34), Max: 37.2 (05-05-24 @ 12:44)  HR: 86 (05-06-24 @ 08:00) (84 - 121)  BP: 148/77 (05-06-24 @ 08:00) (138/71 - 173/99)  RR: 16 (05-06-24 @ 08:00) (12 - 17)  SpO2: 97% (05-06-24 @ 08:00) (96% - 100%)  Wt(kg): --  I&O's Summary    05 May 2024 07:01  -  06 May 2024 07:00  --------------------------------------------------------  IN: 2360 mL / OUT: 3650 mL / NET: -1290 mL    06 May 2024 07:01  -  06 May 2024 10:03  --------------------------------------------------------  IN: 80 mL / OUT: 1200 mL / NET: -1120 mL          PHYSICAL EXAM:  Appearance: Comfortable. No acute distress  HEENT:  Atraumatic. Normocephalic.  Normal oral mucosa  Neurologic: A & O x 3, no gross focal deficits.  Cardiovascular: irregular S1 S2, No murmur, no rubs/gallops. No JVD  Respiratory: Lungs clear to auscultation, unlabored   Gastrointestinal:  Soft, Non-tender, + BS  Lower Extremities: 2+ Peripheral Pulses, No clubbing, cyanosis, or edema  Psychiatry: Patient is calm. No agitation.   Skin: warm and dry.    CURRENT CARDIAC MEDICATIONS:  hydrALAZINE 25 milliGRAM(s) Oral three times a day  hydrALAZINE Injectable 10 milliGRAM(s) IV Push every 2 hours PRN  labetalol Injectable 10 milliGRAM(s) IV Push every 2 hours PRN  metoprolol tartrate 75 milliGRAM(s) Oral every 8 hours  valsartan 80 milliGRAM(s) Oral <User Schedule>      CURRENT OTHER MEDICATIONS:  piperacillin/tazobactam IVPB.. 3.375 Gram(s) IV Intermittent every 8 hours  acetaminophen     Tablet .. 650 milliGRAM(s) Oral every 6 hours PRN Mild Pain (1 - 3)  pantoprazole    Tablet 40 milliGRAM(s) Oral before breakfast  senna 2 Tablet(s) Oral at bedtime PRN Constipation  levothyroxine 75 MICROGram(s) Oral daily  anastrozole 1 milliGRAM(s) Oral daily  heparin   Injectable 5000 Unit(s) SubCutaneous every 8 hours  influenza  Vaccine (HIGH DOSE) 0.7 milliLiter(s) IntraMuscular once  latanoprost 0.005% Ophthalmic Solution 1 Drop(s) Both EYES at bedtime  potassium chloride    Tablet ER 40 milliEquivalent(s) Oral once, Stop order after: 1 Doses  sodium chloride 0.9%. 1000 milliLiter(s) (80 mL/Hr) IV Continuous <Continuous>      LABS:	 	                            12.3   17.18 )-----------( 304      ( 05 May 2024 09:10 )             36.3     05-06    127<L>  |  95<L>  |  9.4  ----------------------------<  105<H>  3.5   |  22.0  |  0.37<L>    Ca    8.1<L>      06 May 2024 06:44  Mg     2.0     05-05    TPro  5.3<L>  /  Alb  3.2<L>  /  TBili  0.9  /  DBili  x   /  AST  17  /  ALT  16  /  AlkPhos  57  05-06    PT/INR/PTT ( 29 Apr 2024 06:00 )                       :                       :      13.8         :       27.9                  .        .                   .              .           .       1.25        .                                       Lipid Profile: Date: 04-29 @ 06:00  Total cholesterol 151; Direct LDL: --; HDL: 79; Triglycerides:62    HgA1c:   TSH:     TELEMETRY: Afib   ECG: Sinus bradycardia 50s     DIAGNOSTIC TESTING:  [ x] Echocardiogram: < from: TTE W or WO Ultrasound Enhancing Agent (04.30.24 @ 14:03) >  < from: TTE W or WO Ultrasound Enhancing Agent (04.30.24 @ 14:03) >      1. Limited study f/u for LV function.   2. Left ventricular cavity is normal in size. Left ventricular systolic function is normal with an ejection fraction visually estimated at 55 to 60 %.   3. Normal right ventricular cavity size and normal systolic function.   4. Mild to moderate tricuspid regurgitation.   5. No pericardial effusion seen.   6. Estimated pulmonary artery systolic pressure is 60 mmHg, consistent with severe pulmonary hypertension.    < end of copied text >

## 2024-05-06 NOTE — PROGRESS NOTE ADULT - ASSESSMENT
82 y/o female with PMHx HTN, Breast Ca (dx 2020, surgery, no chemo/radiation) and A Fib (started taking  Eliquisx1 week ago, last dose 4/28 AM) BIBA to [place]  from home due to headache. Patient had recently been admitted due to HTN after running out of her coreg. CT head found with intraventricular bleed and was transferred to [facility] ED for further evaluation. She was given Andexxa for Eliquis reversal and BP was controlled with nicardipine for SBP > 200s. She recently lost her  and has not been compliant with medications. She is now weaned off nicardipine. She denies chest pain, back pain, SOB, LOVELACE, diaphoresis, syncope or N/V. Of note patient endorses that she had fallen and hit her head a couple of days before initiation of Eliquis but did not communicate to team at that time.

## 2024-05-06 NOTE — PROGRESS NOTE ADULT - SUBJECTIVE AND OBJECTIVE BOX
Preliminary note, official recommendations pending attending review/signature   Seen and examined by Stroke team attending/team, assessment/ plan as discussed with stroke team attending/team as noted.     [facility] Stroke Team  Progress Note     HPI:  84 y/o female with PMHx HTN and A Fib (reportedly only started taking Eliquis x 1 week prior to admission, last dose 4/28 AM), hx of Breast CA? on Anastrazole, who presented to [facility] ED complaining of headache on 4/28/24. Patient is a poor historian so hx obtained from chart. Per notes from [facility], EMS reported SBP > 200 upon arrival to ED. Upon arrival to [facility], CTH was performed and showed an intraventricular hemorrhage. Patient was reversed w/ Andexxa and started on cardene gtt, transferred to [facility] for further workup and care. Patient reportedly lost her  in January and had been taking care of him, and has been having difficulty taking care of herself since his passing. ICH 3 on admission. Patient admitted to Neuro ICU for further monitoring and care. Stroke Neurology consulted for further evaluation/workup.      SUBJECTIVE: Overnight with >900cc on bladder scan s/p straight cath x1.  No new neurologic complaints.  ROS reported negative unless otherwise noted.     acetaminophen     Tablet .. 650 milliGRAM(s) Oral every 6 hours PRN  anastrozole 1 milliGRAM(s) Oral daily  heparin   Injectable 5000 Unit(s) SubCutaneous every 8 hours  hydrALAZINE 25 milliGRAM(s) Oral three times a day  hydrALAZINE Injectable 10 milliGRAM(s) IV Push every 2 hours PRN  influenza  Vaccine (HIGH DOSE) 0.7 milliLiter(s) IntraMuscular once  labetalol Injectable 10 milliGRAM(s) IV Push every 2 hours PRN  latanoprost 0.005% Ophthalmic Solution 1 Drop(s) Both EYES at bedtime  levothyroxine 75 MICROGram(s) Oral daily  metoprolol tartrate 75 milliGRAM(s) Oral every 8 hours  pantoprazole    Tablet 40 milliGRAM(s) Oral before breakfast  piperacillin/tazobactam IVPB.. 3.375 Gram(s) IV Intermittent every 8 hours  senna 2 Tablet(s) Oral at bedtime PRN  sodium chloride 2 Gram(s) Oral three times a day  urea Oral Powder 15 Gram(s) Oral two times a day  valsartan 80 milliGRAM(s) Oral <User Schedule>      PHYSICAL EXAM:   Vital Signs Last 24 Hrs  T(C): 36.9 (06 May 2024 12:35), Max: 37.1 (06 May 2024 00:22)  T(F): 98.4 (06 May 2024 12:35), Max: 98.8 (06 May 2024 00:22)  HR: 97 (06 May 2024 13:00) (84 - 121)  BP: 158/118 (06 May 2024 13:00) (138/71 - 173/99)  BP(mean): 129 (06 May 2024 13:00) (91 - 129)  RR: 15 (06 May 2024 12:00) (12 - 18)  SpO2: 98% (06 May 2024 13:00) (96% - 100%)    Parameters below as of 06 May 2024 12:00  Patient On (Oxygen Delivery Method): room air      General: No acute distress.     NEUROLOGICAL EXAM:  Mental status: Awake and alert, oriented x2, unable to recall current month (states January), but able to state 2024 and in hospital. Speech fluent, follows commands, no neglect.  Cranial Nerves: No facial asymmetry, no nystagmus, no dysarthria,  tongue midline  Motor exam: Normal tone, no drift, 5/5 RUE with slight pronation but no drift, 4+/5 RLE with drift, 5/5 LUE, 5/5 LLE, normal fine finger movements.  Sensation: Intact to light touch   Coordination: no dysmetria  Gait: deferred    LABS:                        12.3   17.18 )-----------( 304      ( 05 May 2024 09:10 )             36.3    05-06    127<L>  |  95<L>  |  9.4  ----------------------------<  105<H>  3.5   |  22.0  |  0.37<L>    Ca    8.1<L>      06 May 2024 06:44  Mg     2.0     05-05    TPro  5.3<L>  /  Alb  3.2<L>  /  TBili  0.9  /  DBili  x   /  AST  17  /  ALT  16  /  AlkPhos  57  05-06    Lipid Profile in AM (04.29.24 @ 06:00)   Cholesterol: 151 mg/dL  Triglycerides, Serum: 62 mg/dL  HDL Cholesterol: 79 mg/dL  Non HDL Cholesterol: 72  LDL Cholesterol Calculated: 60 mg/dL  A1C with Estimated Average Glucose in AM (04.29.24 @ 06:00): 5.3%    RADIOLOGY IMAGING & ADDITIONAL STUDIES (Independently reviewed unless otherwise stated):    MR Head w/wo IV Cont (05.05.24 @ 15:54) >  IMPRESSION: Stable left basal ganglia hemorrhage with extension to the lateral ventricle. Stable mild-to-moderate hydrocephalus. No abnormal enhancement to suggest underlying lesion.  --- End of Report ---      CT Head No Cont (05.03.24 @ 02:57) >  IMPRESSION: Left caudate nucleus intraparenchymal hemorrhage with intraventricular extension, grossly stable overall. Mild hydrocephalus is grossly stable.  --- End of Report ---    CT Head No Cont (05.01.24 @ 08:30) >  IMPRESSION: Stable exam.  --- End of Report ---    TTE W or WO Ultrasound Enhancing Agent (04.30.24 @ 14:03) >  CONCLUSIONS:    1. Limited study f/u for LV function.   2. Left ventricular cavity is normal in size. Left ventricular systolic function is normal with an ejection fraction visually estimated at 55 to 60 %.   3. Normal right ventricular cavity size and normal systolic function.   4. Mild to moderate tricuspid regurgitation.   5. No pericardial effusion seen.   6. Estimated pulmonary artery systolic pressure is 60 mmHg, consistent with severe pulmonary hypertension.    CT Head No Cont (04.29.24 @ 20:33) >  IMPRESSION: Stable acute small left caudate head intraparenchymal hemorrhage measuring 6 mm. Moderate intraventricular hemorrhage   throughout the left lateral ventricle, third ventricle and fourth ventricle. Mild intraventricular hemorrhage in the right lateral ventricle. Mild to moderate hydrocephalus, unchanged..  --- End of Report ---    US Duplex Venous Lower Ext Complete, Bilateral (04.29.24 @ 08:33) >  IMPRESSION: No evidence of deep venous thrombosis in either lower extremity. Right Baker's cyst.  --- End of Report ---    CT Head No Cont (04.28.24 @ 22:53) >  IMPRESSION: No change in intraventricular hemorrhage and dilatation of the lateral and third ventricles compared with prior exam. Ill-defined hemorrhage again seen in the left caudate body and left caudate head.  --- End of Report ---    CT Angio Brain Stroke Protocol  w/ IV Cont (04.28.24 @ 20:02) >IMPRESSION:  CTA NECK:  No significant stenosis of the cervical carotid arteries based on NASCET criteria. Patent cervical vertebral arteries.  CTA HEAD: No high-grade stenosis or occlusion of the major proximal arterial branches. No evidence of an intracranial arterial aneurysm or   arteriovenous malformation on CTA. No evidence of active bleeding within the left caudate and intraventricular hemorrhage.  --- End of Report ---    CT Brain Stroke Protocol (04.28.24 @ 20:02) >  IMPRESSION:Intraventricular hemorrhage, as described above. Ill-defined parenchymal hemorrhage within the left caudate body and caudate head. Mild dilatation of the lateral and third ventricles.  Critical findings were discussed with Dr. Elizabeth at 8:08 PM on 4/28/2024.  --- End of Report ---       [facility] Stroke Team  Progress Note     HPI:  82 y/o female with PMHx HTN and A Fib (reportedly only started taking Eliquis x 1 week prior to admission, last dose 4/28 AM), hx of Breast CA? on Anastrazole, who presented to [facility] ED complaining of headache on 4/28/24. Patient is a poor historian so hx obtained from chart. Per notes from [facility], EMS reported SBP > 200 upon arrival to ED. Upon arrival to [facility], CTH was performed and showed an intraventricular hemorrhage. Patient was reversed w/ Andexxa and started on cardene gtt, transferred to [facility] for further workup and care. Patient reportedly lost her  in January and had been taking care of him, and has been having difficulty taking care of herself since his passing. ICH 3 on admission. Patient admitted to Neuro ICU for further monitoring and care. Stroke Neurology consulted for further evaluation/workup.      SUBJECTIVE: Overnight with >900cc on bladder scan s/p straight cath x1.  No new neurologic complaints.  ROS reported negative unless otherwise noted.     acetaminophen     Tablet .. 650 milliGRAM(s) Oral every 6 hours PRN  anastrozole 1 milliGRAM(s) Oral daily  heparin   Injectable 5000 Unit(s) SubCutaneous every 8 hours  hydrALAZINE 25 milliGRAM(s) Oral three times a day  hydrALAZINE Injectable 10 milliGRAM(s) IV Push every 2 hours PRN  influenza  Vaccine (HIGH DOSE) 0.7 milliLiter(s) IntraMuscular once  labetalol Injectable 10 milliGRAM(s) IV Push every 2 hours PRN  latanoprost 0.005% Ophthalmic Solution 1 Drop(s) Both EYES at bedtime  levothyroxine 75 MICROGram(s) Oral daily  metoprolol tartrate 75 milliGRAM(s) Oral every 8 hours  pantoprazole    Tablet 40 milliGRAM(s) Oral before breakfast  piperacillin/tazobactam IVPB.. 3.375 Gram(s) IV Intermittent every 8 hours  senna 2 Tablet(s) Oral at bedtime PRN  sodium chloride 2 Gram(s) Oral three times a day  urea Oral Powder 15 Gram(s) Oral two times a day  valsartan 80 milliGRAM(s) Oral <User Schedule>      PHYSICAL EXAM:   Vital Signs Last 24 Hrs  T(C): 36.9 (06 May 2024 12:35), Max: 37.1 (06 May 2024 00:22)  T(F): 98.4 (06 May 2024 12:35), Max: 98.8 (06 May 2024 00:22)  HR: 97 (06 May 2024 13:00) (84 - 121)  BP: 158/118 (06 May 2024 13:00) (138/71 - 173/99)  BP(mean): 129 (06 May 2024 13:00) (91 - 129)  RR: 15 (06 May 2024 12:00) (12 - 18)  SpO2: 98% (06 May 2024 13:00) (96% - 100%)    Parameters below as of 06 May 2024 12:00  Patient On (Oxygen Delivery Method): room air      General: No acute distress.     NEUROLOGICAL EXAM:  Mental status: Awake and alert, oriented x2, unable to recall current month (states January), but able to state 2024 and in hospital. Speech fluent, follows commands, no neglect.  Cranial Nerves: No facial asymmetry, no nystagmus, no dysarthria,  tongue midline  Motor exam: Normal tone, no drift, 5/5 RUE with slight pronation but no drift, 4+/5 RLE with drift, 5/5 LUE, 5/5 LLE, normal fine finger movements.  Sensation: Intact to light touch   Coordination: no dysmetria  Gait: deferred    LABS:                        12.3   17.18 )-----------( 304      ( 05 May 2024 09:10 )             36.3    05-06    127<L>  |  95<L>  |  9.4  ----------------------------<  105<H>  3.5   |  22.0  |  0.37<L>    Ca    8.1<L>      06 May 2024 06:44  Mg     2.0     05-05    TPro  5.3<L>  /  Alb  3.2<L>  /  TBili  0.9  /  DBili  x   /  AST  17  /  ALT  16  /  AlkPhos  57  05-06    Lipid Profile in AM (04.29.24 @ 06:00)   Cholesterol: 151 mg/dL  Triglycerides, Serum: 62 mg/dL  HDL Cholesterol: 79 mg/dL  Non HDL Cholesterol: 72  LDL Cholesterol Calculated: 60 mg/dL  A1C with Estimated Average Glucose in AM (04.29.24 @ 06:00): 5.3%    RADIOLOGY IMAGING & ADDITIONAL STUDIES (Independently reviewed unless otherwise stated):    MR Head w/wo IV Cont (05.05.24 @ 15:54) >  IMPRESSION: Stable left basal ganglia hemorrhage with extension to the lateral ventricle. Stable mild-to-moderate hydrocephalus. No abnormal enhancement to suggest underlying lesion.  --- End of Report ---      CT Head No Cont (05.03.24 @ 02:57) >  IMPRESSION: Left caudate nucleus intraparenchymal hemorrhage with intraventricular extension, grossly stable overall. Mild hydrocephalus is grossly stable.  --- End of Report ---    CT Head No Cont (05.01.24 @ 08:30) >  IMPRESSION: Stable exam.  --- End of Report ---    TTE W or WO Ultrasound Enhancing Agent (04.30.24 @ 14:03) >  CONCLUSIONS:    1. Limited study f/u for LV function.   2. Left ventricular cavity is normal in size. Left ventricular systolic function is normal with an ejection fraction visually estimated at 55 to 60 %.   3. Normal right ventricular cavity size and normal systolic function.   4. Mild to moderate tricuspid regurgitation.   5. No pericardial effusion seen.   6. Estimated pulmonary artery systolic pressure is 60 mmHg, consistent with severe pulmonary hypertension.    CT Head No Cont (04.29.24 @ 20:33) >  IMPRESSION: Stable acute small left caudate head intraparenchymal hemorrhage measuring 6 mm. Moderate intraventricular hemorrhage   throughout the left lateral ventricle, third ventricle and fourth ventricle. Mild intraventricular hemorrhage in the right lateral ventricle. Mild to moderate hydrocephalus, unchanged..  --- End of Report ---    US Duplex Venous Lower Ext Complete, Bilateral (04.29.24 @ 08:33) >  IMPRESSION: No evidence of deep venous thrombosis in either lower extremity. Right Baker's cyst.  --- End of Report ---    CT Head No Cont (04.28.24 @ 22:53) >  IMPRESSION: No change in intraventricular hemorrhage and dilatation of the lateral and third ventricles compared with prior exam. Ill-defined hemorrhage again seen in the left caudate body and left caudate head.  --- End of Report ---    CT Angio Brain Stroke Protocol  w/ IV Cont (04.28.24 @ 20:02) >IMPRESSION:  CTA NECK:  No significant stenosis of the cervical carotid arteries based on NASCET criteria. Patent cervical vertebral arteries.  CTA HEAD: No high-grade stenosis or occlusion of the major proximal arterial branches. No evidence of an intracranial arterial aneurysm or   arteriovenous malformation on CTA. No evidence of active bleeding within the left caudate and intraventricular hemorrhage.  --- End of Report ---    CT Brain Stroke Protocol (04.28.24 @ 20:02) >  IMPRESSION:Intraventricular hemorrhage, as described above. Ill-defined parenchymal hemorrhage within the left caudate body and caudate head. Mild dilatation of the lateral and third ventricles.  Critical findings were discussed with Dr. Elizabeth at 8:08 PM on 4/28/2024.  --- End of Report ---

## 2024-05-06 NOTE — PROGRESS NOTE ADULT - PROBLEM SELECTOR PLAN 2
- EMS reported SBP > 200 upon arrival to ED  - In -151  - SBP goal per neuro is 130-150  - BP goal per neuro < 150/90  -In goal

## 2024-05-06 NOTE — PROGRESS NOTE ADULT - ASSESSMENT
A) Hyponatremia with hypertension, stable CNS bleed,    P) IV Fluid  adjusted, iv  antibiotic, follow up labs.

## 2024-05-06 NOTE — PROGRESS NOTE ADULT - ASSESSMENT
84 y/o female with PMHx HTN, Breast Ca (dx 2020, surgery, no chemo/radiation) and A Fib (started taking  Eliquis 1 week ago, last dose 4/28 AM) BIBA to [facility]  from home due to headache. Patient had recently been admitted due to HTN after running out of her coreg. CT head found with intraventricular bleed and was transferred to [facility] ED for further evaluation. Pt was initially admitted to NICU team then downgraded to step down under neurology team. She was given Andexxa for Eliquis reversal and BP was controlled with nicardipine for SBP > 200s. Course was complicated by Afib rvr, hyponatremia and uti w/leukocytosis .Pt was started IV Rocephin today. Repeat CTH on 05/03 stable. TTE ef 55-60%,mild to mod TR. Cardiology and nephrology are following Pt will be transfer to medicine team from neurology team.   Of note patient endorses that she had fallen and hit her head a couple of days before initiation of Eliquis but did not communicate to team at that time.  AC on hold for at least 14 days per neuro. Pend MRI  brain pending    1-IVH/IPH  Suspect secondary to HTN emergency with underlying medication induced coagulopathy from Eliquis,  S/P recent fall  -repeat cth stable   Bop control   cont neurocheck   Mr of brain stable bleed extention to ventricule      2-Hypertensive urgency  -s/p cardene drip  --SBP goal per neuro is 130-150  -continue hydralazine, metoprolol, valsartan  -Monitor BP      3-Hyponatremia  - started on sodium bicarb TID and urea BID  - on 800mL/day fluid restriction  - f/u Nephrology rec    4-UTI /e coli  cont iv abx   final cx result P       5-Sepsis due to UTI   - continue zosyn   - Id consult appreciated    6-A fib with RVR   rate control   - s/p  IV lopressor  -cardiology is adjusting lopressor dose ,increased to 75 mg q8hr w/prn iv lopressor  -TSH normal  - [id number]  -TTE reviewed  -Hold AC for ICH  -F/U cardiology rec      7-HX Breast cancer   -dx 2020, surgery, no chemo/radiation  -on Anastrozole    8-Hypothyroidism  -c/w synthroid  -tsh stable    dvt ppx sq heparin  gi ppx PPI    discharge planing in few days likely [facility]

## 2024-05-06 NOTE — PROGRESS NOTE ADULT - SUBJECTIVE AND OBJECTIVE BOX
Follow up fpr [facility]     pt is seen earlier today   awake alert oriented to place self   confused to date , events   able to follow commands     d/w nurse , chart reviewed         MEDICATIONS  (STANDING):  anastrozole 1 milliGRAM(s) Oral daily  heparin   Injectable 5000 Unit(s) SubCutaneous every 8 hours  MEDICATIONS  (STANDING):  anastrozole 1 milliGRAM(s) Oral daily  heparin   Injectable 5000 Unit(s) SubCutaneous every 8 hours  hydrALAZINE 25 milliGRAM(s) Oral three times a day  influenza  Vaccine (HIGH DOSE) 0.7 milliLiter(s) IntraMuscular once  latanoprost 0.005% Ophthalmic Solution 1 Drop(s) Both EYES at bedtime  levothyroxine 75 MICROGram(s) Oral daily  metoprolol tartrate 75 milliGRAM(s) Oral every 8 hours  pantoprazole    Tablet 40 milliGRAM(s) Oral before breakfast  piperacillin/tazobactam IVPB.. 3.375 Gram(s) IV Intermittent every 8 hours  sodium chloride 2 Gram(s) Oral three times a day  urea Oral Powder 15 Gram(s) Oral two times a day  valsartan 80 milliGRAM(s) Oral <User Schedule>    Vital Signs Last 24 Hrs  T(C): 36.9 (06 May 2024 12:35), Max: 37.1 (06 May 2024 00:22)  T(F): 98.4 (06 May 2024 12:35), Max: 98.8 (06 May 2024 00:22)  HR: 97 (06 May 2024 13:00) (84 - 121)  BP: 158/118 (06 May 2024 13:00) (138/71 - 173/99)  BP(mean): 129 (06 May 2024 13:00) (91 - 129)  RR: 15 (06 May 2024 12:00) (12 - 18)  SpO2: 98% (06 May 2024 13:00) (96% - 100%)    Parameters below as of 06 May 2024 12:00  Patient On (Oxygen Delivery Method): room air        CONSTITUTIONAL: NAD, alert normal speech   RESPIRATORY: lungs are clear to auscultation bilaterally  CARDIOVASCULAR: Regular rate and rhythm, normal S1 and S2,   ABDOMEN: Nontender to palpation, normoactive bowel sounds  MUSCLOSKELETAL: no clubbing or cyanosis of digits  NEUROLOGY: awake alert , strength normal                           12.3   17.18 )-----------( 304      ( 05 May 2024 09:10 )             36.3   05-06    127<L>  |  95<L>  |  9.4  ----------------------------<  105<H>  3.5   |  22.0  |  0.37<L>    Ca    8.1<L>      06 May 2024 06:44  Mg     2.0     05-05    TPro  5.3<L>  /  Alb  3.2<L>  /  TBili  0.9  /  DBili  x   /  AST  17  /  ALT  16  /  AlkPhos  57  05-06        Culture - Urine (collected 04 May 2024 03:15)  Source: Clean Catch Clean Catch (Midstream)  Preliminary Report (05 May 2024 10:47):    >100,000 CFU/ml Escherichia coli      CAPILLARY BLOOD GLUCOSE

## 2024-05-06 NOTE — PROGRESS NOTE ADULT - ASSESSMENT
ASSESSMENT:   82 y/o female with PMHx HTN and A Fib (reportedly only started taking Eliquis x 1 week prior to admission, last dose 4/28 AM), hx of Breast CA? on Anastrazole, who presented to [place] ED complaining of headache on 4/28/24. Patient is a poor historian so hx obtained from chart. Per notes from [place], EMS reported SBP > 200 upon arrival to ED. Upon arrival to [facility], CTH was performed and showed an intraventricular hemorrhage. Patient was reversed w/ Andexxa and started on cardene gtt, transferred to [facility] for further workup and care.    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 Patient has a hx of noncompliance to meds.    PLAN:  NEURO:   -Neurologically with right sided drift/weakness on today's exam, not noted on prior evaluations  -Recommend URGENT CTH to evaluate for change in hemorrhage  -Continue close monitoring for neurologic deterioration    -STAT head CT for any neurologic change, especially given patient has hydrocephalus  -Stroke neuro checks q2hrs  -MRI brain w/wo noted above  -TTE noted above  -Transferred to medicine service 5/4/24 for ongoing management of hyponatremia, UTI, afib w/ RVR, hypertension    -DVT ppx: Heparin s.c [x] LMWH [] SCD[]    -Dysphagia screen: pass  -Physical therapy/OT/Speech eval/treatment.     #Stroke prevention  -SBP goal 130-150mmHg, maintain BP under 150/90 mmHg  -ANTITHROMBOTIC THERAPY: Not indicated at this time given IPH/IVH  -AFIB - recommend hold anticoagulation x 4 weeks post-stroke, then obtain repeat CTH to evaluate for stability prior to resuming Eliquis (Anticoagulation only to be restarted if BP remains stable and if she can demonstrate compliance. Per cardio note: CHADSVasc 4, but currently with acute cerebral bleeding and with recent fall injury/trauma not ideal candidate for long term AC use, consider elective outpatient LAAO)   -Statin therapy not indicated at this time given LDL=60  -HA1C 5.3  -patient should have all age and risk appropriate malignancy screenings with PCP or sooner if clinically suspected   -Stroke education     OTHER:    -condition and plan of care d/w patient, medicine team; questions and concerns addressed.     DISPOSITION: Rehab or home depending on PT eval once stable and workup is complete per primary team    CORE MEASURES:        Admission NIHSS: 0     Tenecteplase : [] YES [x] NO      LDL/HDL/A1C: 60/79/5.3     Depression Screen- if depression hx and/or present      Statin Therapy: Not indicated at this time, IPH/IVH     Dysphagia Screen: [x] PASS [] FAIL     Smoking [] YES [x] NO      Afib [x] YES [] NO     Stroke Education [x] YES [] NO

## 2024-05-06 NOTE — PROGRESS NOTE ADULT - PROBLEM SELECTOR PLAN 1
- Recent diagnosis last week  - Heart rates improved, occasional tachy episodes to 130s. May be driven by infection, volume status, de-conditioning  - TSH normal  - c/w metoprolol 75 mg q8h for rate control. S/p x1 additional metoprolol 25 mg po 5/5  - EF preserved  - [id number]   -Here with intraventricular hemorrhage 2/2 trauma. AC on hold for at least 14 days per neuro. MR with stable hemorrhage  -Outpatient LAAO evaluation. - Recent diagnosis last week  - Heart rates improved, occasional tachy episodes to 130s. May be driven by infection, volume status, de-conditioning  - TSH normal  - c/w metoprolol 75 mg q8h for rate control. S/p x1 additional metoprolol 25 mg po 5/5  - EF preserved  - [id number]   -Here with intraventricular hemorrhage 2/2 trauma. AC on hold for at least 14 days per neuro. MR with stable hemorrhage  -Outpatient L[facility]O evaluation.    Will need to obtain outpatient records regarding metoprolol dosing  Tried to call today but office was closed

## 2024-05-06 NOTE — PROGRESS NOTE ADULT - SUBJECTIVE AND OBJECTIVE BOX
NEPHROLOGY INTERVAL HPI/OVERNIGHT EVENTS:    No new events.    MEDICATIONS  (STANDING):  anastrozole 1 milliGRAM(s) Oral daily  cefTRIAXone Injectable.      heparin   Injectable 5000 Unit(s) SubCutaneous every 8 hours  hydrALAZINE 25 milliGRAM(s) Oral three times a day  influenza  Vaccine (HIGH DOSE) 0.7 milliLiter(s) IntraMuscular once  latanoprost 0.005% Ophthalmic Solution 1 Drop(s) Both EYES at bedtime  levothyroxine 75 MICROGram(s) Oral daily  metoprolol tartrate 75 milliGRAM(s) Oral every 8 hours  pantoprazole    Tablet 40 milliGRAM(s) Oral before breakfast  piperacillin/tazobactam IVPB.- 3.375 Gram(s) IV Intermittent once  sodium chloride 0.9%. 1000 milliLiter(s) (100 mL/Hr) IV Continuous <Continuous>  valsartan 80 milliGRAM(s) Oral <User Schedule>    MEDICATIONS  (PRN):  acetaminophen     Tablet .. 650 milliGRAM(s) Oral every 6 hours PRN Mild Pain (1 - 3)  hydrALAZINE Injectable 10 milliGRAM(s) IV Push every 2 hours PRN SBP> 150 and DBP> 90  labetalol Injectable 10 milliGRAM(s) IV Push every 2 hours PRN SBP> 150 and DBP> 90  senna 2 Tablet(s) Oral at bedtime PRN Constipation      Allergies    No Known Allergies      Vital Signs Last 24 Hrs  T(C): 36.7 (06 May 2024 07:34), Max: 37.2 (05 May 2024 12:44)  T(F): 98 (06 May 2024 07:34), Max: 98.9 (05 May 2024 12:44)  HR: 107 (06 May 2024 10:00) (84 - 121)  BP: 156/80 (06 May 2024 10:00) (138/71 - 173/99)  BP(mean): 103 (06 May 2024 10:00) (91 - 121)  RR: 18 (06 May 2024 10:00) (12 - 18)  SpO2: 98% (06 May 2024 10:00) (96% - 100%)    Parameters below as of 06 May 2024 10:00  Patient On (Oxygen Delivery Method): room air      T(C): 36.5 (05 May 2024 07:34), Max: 37.9 (05 May 2024 00:00)  T(F): 97.7 (05 May 2024 07:34), Max: 100.2 (05 May 2024 00:00)  HR: 94 (05 May 2024 10:00) (93 - 129)  BP: 159/92 (05 May 2024 10:00) (123/111 - 167/109)  BP(mean): 109 (05 May 2024 10:00) (89 - 130)  RR: 15 (05 May 2024 10:00) (15 - 19)  SpO2: 98% (05 May 2024 10:00) (96% - 100%)    Parameters below as of 05 May 2024 10:00  Patient On (Oxygen Delivery Method): room air      T(C): 36.4 (04 May 2024 15:46), Max: 37.2 (04 May 2024 07:26)  T(F): 97.6 (04 May 2024 15:46), Max: 99 (04 May 2024 07:26)  HR: 119 (04 May 2024 18:00) (94 - 126)  BP: 148/96 (04 May 2024 18:00) (123/111 - 159/107)  BP(mean): 113 (04 May 2024 18:00) (89 - 121)  RR: 19 (04 May 2024 18:00) (16 - 19)  SpO2: 99% (04 May 2024 18:00) (93% - 100%)    Parameters below as of 04 May 2024 18:00  Patient On (Oxygen Delivery Method): room air         PHYSICAL EXAM:    GENERAL: comfortable in bed eating  HEAD:  wnl  EYES:   ENMT:   NECK: veins flat in bed  NERVOUS SYSTEM: alert, verbal  CHEST/LUNG: clear  , no 02  HEART: A. Fib on monitor  ABDOMEN:  NT   EXTREMITIES: legs  same   LYMPH:   SKIN: no rash  : Neg    LABS:    05-06    127<L>  |  95<L>  |  9.4  ----------------------------<  105<H>  3.5   |  22.0  |  0.37<L>    Ca    8.1<L>      06 May 2024 06:44  Mg     2.0     05-05    TPro  5.3<L>  /  Alb  3.2<L>  /  TBili  0.9  /  DBili  x   /  AST  17  /  ALT  16  /  AlkPhos  57  05-06      05-05    129<L>  |  96  |  21.3<H>  ----------------------------<  117<H>  4.0   |  24.0  |  0.49<L>    Ca    8.1<L>      05 May 2024 09:10  Phos  2.8     05-04  Mg     2.0     05-05    TPro  5.6<L>  /  Alb  3.4  /  TBili  0.8  /  DBili  x   /  AST  18  /  ALT  18  /  AlkPhos  66  05-05                          13.1   25.36 )-----------( 308      ( 04 May 2024 02:25 )             37.0     05-04    126<L>  |  87<L>  |  37.0<H>  ----------------------------<  145<H>  3.8   |  25.0  |  0.59    Ca    9.0      04 May 2024 12:08  Phos  2.8     05-04  Mg     1.7     05-04        Urinalysis Basic - ( 04 May 2024 12:08 )    Color: x / Appearance: x / SG: x / pH: x  Gluc: 145 mg/dL / Ketone: x  / Bili: x / Urobili: x   Blood: x / Protein: x / Nitrite: x   Leuk Esterase: x / RBC: x / WBC x   Sq Epi: x / Non Sq Epi: x / Bacteria: x      Magnesium: 1.7 mg/dL (05-04 @ 02:25)  Phosphorus: 2.8 mg/dL (05-04 @ 02:25)          RADIOLOGY & ADDITIONAL TESTS:

## 2024-05-07 LAB
ALBUMIN SERPL ELPH-MCNC: 3.3 G/DL — SIGNIFICANT CHANGE UP (ref 3.3–5.2)
ALP SERPL-CCNC: 58 U/L — SIGNIFICANT CHANGE UP (ref 40–120)
ALT FLD-CCNC: 16 U/L — SIGNIFICANT CHANGE UP
ANION GAP SERPL CALC-SCNC: 13 MMOL/L — SIGNIFICANT CHANGE UP (ref 5–17)
AST SERPL-CCNC: 18 U/L — SIGNIFICANT CHANGE UP
BILIRUB SERPL-MCNC: 1.1 MG/DL — SIGNIFICANT CHANGE UP (ref 0.4–2)
BUN SERPL-MCNC: 44.8 MG/DL — HIGH (ref 8–20)
CALCIUM SERPL-MCNC: 8.7 MG/DL — SIGNIFICANT CHANGE UP (ref 8.4–10.5)
CHLORIDE SERPL-SCNC: 95 MMOL/L — LOW (ref 96–108)
CO2 SERPL-SCNC: 20 MMOL/L — LOW (ref 22–29)
CREAT SERPL-MCNC: 0.49 MG/DL — LOW (ref 0.5–1.3)
EGFR: 92 ML/MIN/1.73M2 — SIGNIFICANT CHANGE UP
GLUCOSE SERPL-MCNC: 130 MG/DL — HIGH (ref 70–99)
POTASSIUM SERPL-MCNC: 3.9 MMOL/L — SIGNIFICANT CHANGE UP (ref 3.5–5.3)
POTASSIUM SERPL-SCNC: 3.9 MMOL/L — SIGNIFICANT CHANGE UP (ref 3.5–5.3)
PROT SERPL-MCNC: 5.5 G/DL — LOW (ref 6.6–8.7)
SODIUM SERPL-SCNC: 128 MMOL/L — LOW (ref 135–145)
URATE SERPL-MCNC: 1 MG/DL — LOW (ref 2.4–5.7)

## 2024-05-07 PROCEDURE — 99232 SBSQ HOSP IP/OBS MODERATE 35: CPT

## 2024-05-07 PROCEDURE — 99232 SBSQ HOSP IP/OBS MODERATE 35: CPT | Mod: 25

## 2024-05-07 PROCEDURE — 99233 SBSQ HOSP IP/OBS HIGH 50: CPT

## 2024-05-07 RX ORDER — POTASSIUM CHLORIDE 20 MEQ
20 PACKET (EA) ORAL DAILY
Refills: 0 | Status: DISCONTINUED | OUTPATIENT
Start: 2024-05-07 | End: 2024-05-13

## 2024-05-07 RX ORDER — TOLVAPTAN 15 MG/1
15 TABLET ORAL DAILY
Refills: 0 | Status: COMPLETED | OUTPATIENT
Start: 2024-05-07 | End: 2024-05-08

## 2024-05-07 RX ORDER — METOPROLOL TARTRATE 50 MG
75 TABLET ORAL EVERY 6 HOURS
Refills: 0 | Status: COMPLETED | OUTPATIENT
Start: 2024-05-07 | End: 2024-05-09

## 2024-05-07 RX ORDER — CEFTRIAXONE 500 MG/1
1000 INJECTION, POWDER, FOR SOLUTION INTRAMUSCULAR; INTRAVENOUS EVERY 24 HOURS
Refills: 0 | Status: COMPLETED | OUTPATIENT
Start: 2024-05-07 | End: 2024-05-09

## 2024-05-07 RX ADMIN — VALSARTAN 80 MILLIGRAM(S): 80 TABLET ORAL at 12:43

## 2024-05-07 RX ADMIN — Medication 10 MILLIGRAM(S): at 00:15

## 2024-05-07 RX ADMIN — SODIUM CHLORIDE 2 GRAM(S): 9 INJECTION INTRAMUSCULAR; INTRAVENOUS; SUBCUTANEOUS at 05:06

## 2024-05-07 RX ADMIN — HEPARIN SODIUM 5000 UNIT(S): 5000 INJECTION INTRAVENOUS; SUBCUTANEOUS at 12:43

## 2024-05-07 RX ADMIN — VALSARTAN 80 MILLIGRAM(S): 80 TABLET ORAL at 21:10

## 2024-05-07 RX ADMIN — Medication 75 MILLIGRAM(S): at 12:42

## 2024-05-07 RX ADMIN — PANTOPRAZOLE SODIUM 40 MILLIGRAM(S): 20 TABLET, DELAYED RELEASE ORAL at 05:06

## 2024-05-07 RX ADMIN — Medication 15 GRAM(S): at 05:07

## 2024-05-07 RX ADMIN — SODIUM CHLORIDE 2 GRAM(S): 9 INJECTION INTRAMUSCULAR; INTRAVENOUS; SUBCUTANEOUS at 12:43

## 2024-05-07 RX ADMIN — PIPERACILLIN AND TAZOBACTAM 25 GRAM(S): 4; .5 INJECTION, POWDER, LYOPHILIZED, FOR SOLUTION INTRAVENOUS at 05:08

## 2024-05-07 RX ADMIN — Medication 25 MILLIGRAM(S): at 05:06

## 2024-05-07 RX ADMIN — CEFTRIAXONE 1000 MILLIGRAM(S): 500 INJECTION, POWDER, FOR SOLUTION INTRAMUSCULAR; INTRAVENOUS at 12:42

## 2024-05-07 RX ADMIN — HEPARIN SODIUM 5000 UNIT(S): 5000 INJECTION INTRAVENOUS; SUBCUTANEOUS at 05:06

## 2024-05-07 RX ADMIN — Medication 75 MILLIGRAM(S): at 17:10

## 2024-05-07 RX ADMIN — Medication 10 MILLIGRAM(S): at 02:25

## 2024-05-07 RX ADMIN — HEPARIN SODIUM 5000 UNIT(S): 5000 INJECTION INTRAVENOUS; SUBCUTANEOUS at 21:10

## 2024-05-07 RX ADMIN — Medication 75 MILLIGRAM(S): at 05:06

## 2024-05-07 RX ADMIN — ANASTROZOLE 1 MILLIGRAM(S): 1 TABLET ORAL at 12:43

## 2024-05-07 RX ADMIN — Medication 25 MILLIGRAM(S): at 21:10

## 2024-05-07 RX ADMIN — TOLVAPTAN 15 MILLIGRAM(S): 15 TABLET ORAL at 12:45

## 2024-05-07 RX ADMIN — Medication 25 MILLIGRAM(S): at 12:42

## 2024-05-07 RX ADMIN — Medication 10 MILLIGRAM(S): at 16:23

## 2024-05-07 RX ADMIN — SODIUM CHLORIDE 2 GRAM(S): 9 INJECTION INTRAMUSCULAR; INTRAVENOUS; SUBCUTANEOUS at 21:10

## 2024-05-07 RX ADMIN — LATANOPROST 1 DROP(S): 0.05 SOLUTION/ DROPS OPHTHALMIC; TOPICAL at 21:11

## 2024-05-07 RX ADMIN — Medication 75 MICROGRAM(S): at 05:06

## 2024-05-07 RX ADMIN — VALSARTAN 80 MILLIGRAM(S): 80 TABLET ORAL at 05:08

## 2024-05-07 RX ADMIN — Medication 15 GRAM(S): at 17:10

## 2024-05-07 RX ADMIN — Medication 20 MILLIEQUIVALENT(S): at 12:43

## 2024-05-07 NOTE — PROGRESS NOTE ADULT - ASSESSMENT
82 y/o female with PMHx HTN and A Fib (started taking  Eliquisx1 week ago, last dose 4/28 AM) BIBA from home due to headache.   According to EMS< BP around 206 systolic.   pt   was unable to recall other bp meds. pt denies cp, sob, visual changes, slurred speech, fall/head injury, numbness/weakness, or any other complaints. ICH 3  AS ABOVE PT TRANSFERRED FORM [facility]  4/28 WITH IVH   SEEN BY NEUROSURGERY ICU   TRANSFERRED TO  AND  HAD ELEVATED WBC COUNT CONCERN FOR INFECTION  PT AWAKE OOB TO CHAIR ANSWERS QUESTION    BLOOD CX NEGATIVE  URINE CX ECOLI    ON ROCEPHIN   WOULD COMPLETE TOTAL 7 DAYS  WHEN READY FOR  D/C CAN TRANSITION TO ORAL ABX  WILL FOLLOW UP AS NEEDED PLEASE CALL IF  QUESITONS

## 2024-05-07 NOTE — PROGRESS NOTE ADULT - SUBJECTIVE AND OBJECTIVE BOX
Follow up for IVH     pt is seen in am , awake alert oriented to place self  no crystal [facility]head nurse   chart reviewed             MEDICATIONS  (STANDING):  anastrozole 1 milliGRAM(s) Oral daily  heparin   Injectable 5000 Unit(s) SubCutaneous every 8 hours  MEDICATIONS  (STANDING):  anastrozole 1 milliGRAM(s) Oral daily  heparin   Injectable 5000 Unit(s) SubCutaneous every 8 hours  hydrALAZINE 25 milliGRAM(s) Oral three times a day  influenza  Vaccine (HIGH DOSE) 0.7 milliLiter(s) IntraMuscular once  latanoprost 0.005% Ophthalmic Solution 1 Drop(s) Both EYES at bedtime  levothyroxine 75 MICROGram(s) Oral daily  metoprolol tartrate 75 milliGRAM(s) Oral every 8 hours  pantoprazole    Tablet 40 milliGRAM(s) Oral before breakfast  piperacillin/tazobactam IVPB.. 3.375 Gram(s) IV Intermittent every 8 hours  sodium chloride 2 Gram(s) Oral three times a day  urea Oral Powder 15 Gram(s) Oral two times a day  valsartan 80 milliGRAM(s) Oral <User Schedule>      Vital Signs Last 24 Hrs  T(C): 37 (07 May 2024 08:22), Max: 37.3 (07 May 2024 00:09)  T(F): 98.6 (07 May 2024 08:22), Max: 99.1 (07 May 2024 00:09)  HR: 108 (07 May 2024 10:00) (85 - 120)  BP: 134/96 (07 May 2024 10:00) (134/96 - 166/115)  BP(mean): 103 (07 May 2024 10:00) (100 - 129)  RR: 15 (07 May 2024 10:00) (15 - 20)  SpO2: 99% (07 May 2024 10:00) (97% - 99%)    Parameters below as of 07 May 2024 10:00  Patient On (Oxygen Delivery Method): room air        CONSTITUTIONAL: NAD, alert normal speech   RESPIRATORY: lungs are clear to auscultation bilaterally  CARDIOVASCULAR: Regular rate and rhythm, normal S1 and S2,   ABDOMEN: Nontender to palpation, normoactive bowel sounds  MUSCLOSKELETAL: no clubbing or cyanosis  NEUROLOGY: awake alert , speech normal              05-07    128<L>  |  95<L>  |  44.8<H>  ----------------------------<  130<H>  3.9   |  20.0<L>  |  0.49<L>    Ca    8.7      07 May 2024 05:40    TPro  5.5<L>  /  Alb  3.3  /  TBili  1.1  /  DBili  x   /  AST  18  /  ALT  16  /  AlkPhos  58  05-07   /  AlkPhos  57  05-06        Culture - Urine (collected 04 May 2024 03:15)  Source: Clean Catch Clean Catch (Midstream)  Preliminary Report (05 May 2024 10:47):    >100,000 CFU/ml Escherichia coli

## 2024-05-07 NOTE — PROGRESS NOTE ADULT - SUBJECTIVE AND OBJECTIVE BOX
[facility]                                                         CARDIOLOGY AT [place] [address]                                                         Telephone: [phone]. Fax:[phone]                                                                             PROGRESS NOTE    Reason for follow up: Afib RVR, HTN urgency  Update: rates fluctuating, will increase metoprolol to 75mg PO q6h      Review of symptoms:   Cardiac:  No chest pain. No dyspnea. No palpitations.  Respiratory: no cough. No dyspnea  Gastrointestinal: No diarrhea. No abdominal pain. No bleeding.   Neuro: No focal neuro complaints.    Vitals:  T(C): 37 (05-07-24 @ 08:22), Max: 37.3 (05-07-24 @ 00:09)  HR: 108 (05-07-24 @ 10:00) (85 - 120)  BP: 134/96 (05-07-24 @ 10:00) (134/96 - 166/115)  RR: 15 (05-07-24 @ 10:00) (15 - 20)  SpO2: 99% (05-07-24 @ 10:00) (97% - 99%)  Wt(kg): --  I&O's Summary    06 May 2024 07:01  -  07 May 2024 07:00  --------------------------------------------------------  IN: 785 mL / OUT: 2125 mL / NET: -1340 mL    07 May 2024 07:01  -  07 May 2024 12:35  --------------------------------------------------------  IN: 120 mL / OUT: 350 mL / NET: -230 mL          PHYSICAL EXAM:  Appearance: Comfortable. No acute distress  HEENT:  Atraumatic. Normocephalic.  Normal oral mucosa  Neurologic: A & O x 3, no gross focal deficits.  Cardiovascular: irreg irregular No murmur, no rubs/gallops. No JVD  Respiratory: Lungs clear to auscultation, unlabored   Gastrointestinal:  Soft, Non-tender, + BS  Lower Extremities: 2+ Peripheral Pulses, No clubbing, cyanosis, or edema  Psychiatry: Patient is calm. No agitation.   Skin: warm and dry.    CURRENT CARDIAC MEDICATIONS:  hydrALAZINE 25 milliGRAM(s) Oral three times a day  hydrALAZINE Injectable 10 milliGRAM(s) IV Push every 2 hours PRN  labetalol Injectable 10 milliGRAM(s) IV Push every 2 hours PRN  metoprolol tartrate 75 milliGRAM(s) Oral every 8 hours  tolvaptan 15 milliGRAM(s) Oral daily  urea Oral Powder 15 Gram(s) Oral two times a day  valsartan 80 milliGRAM(s) Oral <User Schedule>      CURRENT OTHER MEDICATIONS:  cefTRIAXone Injectable. 1000 milliGRAM(s) IV Push every 24 hours  acetaminophen     Tablet .. 650 milliGRAM(s) Oral every 6 hours PRN Mild Pain (1 - 3)  pantoprazole    Tablet 40 milliGRAM(s) Oral before breakfast  senna 2 Tablet(s) Oral at bedtime PRN Constipation  levothyroxine 75 MICROGram(s) Oral daily  anastrozole 1 milliGRAM(s) Oral daily  heparin   Injectable 5000 Unit(s) SubCutaneous every 8 hours  influenza  Vaccine (HIGH DOSE) 0.7 milliLiter(s) IntraMuscular once  latanoprost 0.005% Ophthalmic Solution 1 Drop(s) Both EYES at bedtime  potassium chloride    Tablet ER 20 milliEquivalent(s) Oral daily  sodium chloride 2 Gram(s) Oral three times a day      LABS:	 	        05-07    128<L>  |  95<L>  |  44.8<H>  ----------------------------<  130<H>  3.9   |  20.0<L>  |  0.49<L>    Ca    8.7      07 May 2024 05:40    TPro  5.5<L>  /  Alb  3.3  /  TBili  1.1  /  DBili  x   /  AST  18  /  ALT  16  /  AlkPhos  58  05-07    PT/INR/PTT ( 29 Apr 2024 06:00 )                       :                       :      13.8         :       27.9                  .        .                   .              .           .       1.25        .                                       Lipid Profile: Date: 04-29 @ 06:00  Total cholesterol 151; Direct LDL: --; HDL: 79; Triglycerides:62    HgA1c:   TSH:     TELEMETRY: Afib 130-150s  ECG:    DIAGNOSTIC TESTING:  [ ] Echocardiogram: < from: TTE W or WO Ultrasound Enhancing Agent (04.30.24 @ 14:03) >  TRANSTHORACIC ECHOCARDIOGRAM REPORT  ________________________________________________________________________________    Pt. Name:       JUMA DOMINGUEZ Study Date:    4/30/2024  MRN:            [id number]    YOB: 1940  Accession #:    [id number]    Age:           83 years  Account#:       [id number]   Gender:        F  Visit ID#  Heart Rate:                  Height:        64.96 in (165.00 cm)  Rhythm:                      Weight:132.28 lb (60.00 kg)  Blood Pressure: 132/93 mmHg  BSA/BMI:       1.66 m² / 22.04 kg/m²  ________________________________________________________________________________________  Referring Physician:    Surendra Smart  Interpreting Physician: Bryan Millan MD  Primary Sonographer:    Immanuel Pate    CPT:               ECHO TTE W/O CON F/U LTD - 60543.m;LIMITED SPECTRAL -                     37510.m;DOPPL ECHO COLOR FLOW - 57629.m  Indication(s):     Abnormal electrocardiogram ECG/EKG - R94.31  Procedure:         Limited transthoracic echocardiogram.  Ordering Location: [facility]  Admission Status:  Inpatient  UEA Reaction:      Patient had a rash after injection of Ultrasound Enhancing                     Agent.  Study Information: Image quality for this study is fair.    _______________________________________________________________________________________     CONCLUSIONS:      1. Limited study f/u for LV function.   2. Left ventricular cavity is normal in size. Left ventricular systolic function is normal with an ejection fraction visually estimated at 55 to 60 %.   3. Normal right ventricular cavity size and normal systolic function.   4. Mild to moderate tricuspid regurgitation.   5. No pericardial effusion seen.   6. Estimated pulmonary artery systolic pressure is 60 mmHg, consistent with severe pulmonary hypertension.    < end of copied text >    [ ]  Catheterization:  [ ] Stress Test:    OTHER:

## 2024-05-07 NOTE — PROGRESS NOTE ADULT - ASSESSMENT
82 y/o female with PMHx HTN, Breast Ca (dx 2020, surgery, no chemo/radiation) and A Fib (started taking  Eliquisx1 week ago, last dose 4/28 AM) BIBA to [facility]  from home due to headache. Patient had recently been admitted due to HTN after running out of her coreg. CT head found with intraventricular bleed and was transferred to [facility] ED for further evaluation. She was given Andexxa for Eliquis reversal and BP was controlled with nicardipine for SBP > 200s. She recently lost her  and has not been compliant with medications. She is now weaned off nicardipine. She denies chest pain, back pain, SOB, LOVELACE, diaphoresis, syncope or N/V. Of note patient endorses that she had fallen and hit her head a couple of days before initiation of Eliquis but did not communicate to team at that time.

## 2024-05-07 NOTE — PROGRESS NOTE ADULT - PROBLEM SELECTOR PLAN 1
- Recent diagnosis last week  - Heart rates improved, occasional tachy episodes to 130s. May be driven by infection, volume status, de-conditioning, but needs further rate control  - TSH normal  - c/w metoprolol 75 mg q6H with hold parameters  - EF preserved  - [id number]   -Here with intraventricular hemorrhage 2/2 trauma. AC on hold for at least 14 days per neuro. MR with stable hemorrhage  -Outpatient [facility] evaluation.  - records requested from Dr. North office

## 2024-05-07 NOTE — PROGRESS NOTE ADULT - SUBJECTIVE AND OBJECTIVE BOX
NEPHROLOGY INTERVAL HPI/OVERNIGHT EVENTS:    No new events.    MEDICATIONS  (STANDING):  anastrozole 1 milliGRAM(s) Oral daily  cefTRIAXone Injectable.      heparin   Injectable 5000 Unit(s) SubCutaneous every 8 hours  hydrALAZINE 25 milliGRAM(s) Oral three times a day  influenza  Vaccine (HIGH DOSE) 0.7 milliLiter(s) IntraMuscular once  latanoprost 0.005% Ophthalmic Solution 1 Drop(s) Both EYES at bedtime  levothyroxine 75 MICROGram(s) Oral daily  metoprolol tartrate 75 milliGRAM(s) Oral every 8 hours  pantoprazole    Tablet 40 milliGRAM(s) Oral before breakfast  piperacillin/tazobactam IVPB.- 3.375 Gram(s) IV Intermittent once  sodium chloride 0.9%. 1000 milliLiter(s) (100 mL/Hr) IV Continuous <Continuous>  valsartan 80 milliGRAM(s) Oral <User Schedule>    MEDICATIONS  (PRN):  acetaminophen     Tablet .. 650 milliGRAM(s) Oral every 6 hours PRN Mild Pain (1 - 3)  hydrALAZINE Injectable 10 milliGRAM(s) IV Push every 2 hours PRN SBP> 150 and DBP> 90  labetalol Injectable 10 milliGRAM(s) IV Push every 2 hours PRN SBP> 150 and DBP> 90  senna 2 Tablet(s) Oral at bedtime PRN Constipation      Allergies    No Known Allergies      Vital Signs Last 24 Hrs  T(C): 37 (07 May 2024 08:22), Max: 37.3 (07 May 2024 00:09)  T(F): 98.6 (07 May 2024 08:22), Max: 99.1 (07 May 2024 00:09)  HR: 108 (07 May 2024 10:00) (85 - 120)  BP: 134/96 (07 May 2024 10:00) (134/96 - 166/115)  BP(mean): 103 (07 May 2024 10:00) (100 - 129)  RR: 15 (07 May 2024 10:00) (15 - 20)  SpO2: 99% (07 May 2024 10:00) (97% - 99%)    Parameters below as of 07 May 2024 10:00  Patient On (Oxygen Delivery Method): room air      T(C): 36.7 (06 May 2024 07:34), Max: 37.2 (05 May 2024 12:44)  T(F): 98 (06 May 2024 07:34), Max: 98.9 (05 May 2024 12:44)  HR: 107 (06 May 2024 10:00) (84 - 121)  BP: 156/80 (06 May 2024 10:00) (138/71 - 173/99)  BP(mean): 103 (06 May 2024 10:00) (91 - 121)  RR: 18 (06 May 2024 10:00) (12 - 18)  SpO2: 98% (06 May 2024 10:00) (96% - 100%)    Parameters below as of 06 May 2024 10:00  Patient On (Oxygen Delivery Method): room air      T(C): 36.5 (05 May 2024 07:34), Max: 37.9 (05 May 2024 00:00)  T(F): 97.7 (05 May 2024 07:34), Max: 100.2 (05 May 2024 00:00)  HR: 94 (05 May 2024 10:00) (93 - 129)  BP: 159/92 (05 May 2024 10:00) (123/111 - 167/109)  BP(mean): 109 (05 May 2024 10:00) (89 - 130)  RR: 15 (05 May 2024 10:00) (15 - 19)  SpO2: 98% (05 May 2024 10:00) (96% - 100%)    Parameters below as of 05 May 2024 10:00  Patient On (Oxygen Delivery Method): room air      T(C): 36.4 (04 May 2024 15:46), Max: 37.2 (04 May 2024 07:26)  T(F): 97.6 (04 May 2024 15:46), Max: 99 (04 May 2024 07:26)  HR: 119 (04 May 2024 18:00) (94 - 126)  BP: 148/96 (04 May 2024 18:00) (123/111 - 159/107)  BP(mean): 113 (04 May 2024 18:00) (89 - 121)  RR: 19 (04 May 2024 18:00) (16 - 19)  SpO2: 99% (04 May 2024 18:00) (93% - 100%)    Parameters below as of 04 May 2024 18:00  Patient On (Oxygen Delivery Method): room air         PHYSICAL EXAM:    GENERAL: comfortable in chair  HEAD:  wnl  EYES:   ENMT:   NECK: veins flat up right  NERVOUS SYSTEM: alert, verbal  CHEST/LUNG: clear  , no 02  HEART: A. Fib on monitor  ABDOMEN:  NT   EXTREMITIES: legs  same   LYMPH:   SKIN: no rash  : Neg    LABS:        05-06    127<L>  |  95<L>  |  9.4  ----------------------------<  105<H>  05-07    128<L>  |  95<L>  |  44.8<H>  ----------------------------<  130<H>  3.9   |  20.0<L>  |  0.49<L>    Ca    8.7      07 May 2024 05:40    TPro  5.5<L>  /  Alb  3.3  /  TBili  1.1  /  DBili  x   /  AST  18  /  ALT  16  /  AlkPhos  58  05-07 05-07    128<L>  |  95<L>  |  44.8<H>  ----------------------------<  130<H>  3.9   |  20.0<L>  |  0.49<L>    Ca    8.7      07 May 2024 05:40    TPro  5.5<L>  /  Alb  3.3  /  TBili  1.1  /  DBili  x   /  AST  18  /  ALT  16  /  AlkPhos  58  05-07  3.5   |  22.0  |  0.37<L>    Ca    8.1<L>      06 May 2024 06:44  Mg     2.0     05-05    TPro  5.3<L>  /  Alb  3.2<L>  /  TBili  0.9  /  DBili  x   /  AST  17  /  ALT  16  /  AlkPhos  57  05-06 05-05    129<L>  |  96  |  21.3<H>  ----------------------------<  117<H>  4.0   |  24.0  |  0.49<L>    Ca    8.1<L>      05 May 2024 09:10  Phos  2.8     05-04  Mg     2.0     05-05    TPro  5.6<L>  /  Alb  3.4  /  TBili  0.8  /  DBili  x   /  AST  18  /  ALT  18  /  AlkPhos  66  05-05                          13.1   25.36 )-----------( 308      ( 04 May 2024 02:25 )             37.0     05-04    126<L>  |  87<L>  |  37.0<H>  ----------------------------<  145<H>  3.8   |  25.0  |  0.59    Ca    9.0      04 May 2024 12:08  Phos  2.8     05-04  Mg     1.7     05-04        Urinalysis Basic - ( 04 May 2024 12:08 )    Color: x / Appearance: x / SG: x / pH: x  Gluc: 145 mg/dL / Ketone: x  / Bili: x / Urobili: x   Blood: x / Protein: x / Nitrite: x   Leuk Esterase: x / RBC: x / WBC x   Sq Epi: x / Non Sq Epi: x / Bacteria: x      Magnesium: 1.7 mg/dL (05-04 @ 02:25)  Phosphorus: 2.8 mg/dL (05-04 @ 02:25)          RADIOLOGY & ADDITIONAL TESTS:

## 2024-05-07 NOTE — PROGRESS NOTE ADULT - SUBJECTIVE AND OBJECTIVE BOX
Preliminary note, offical recommendations pending attending review/signature   [facility] Stroke Team  Progress Note     HPI:  82 y/o female with PMHx HTN and A Fib (reportedly only started taking Eliquis x 1 week prior to admission, last dose 4/28 AM), hx of Breast CA? on Anastrazole, who presented to [place] ED complaining of headache on 4/28/24. Patient is a poor historian so hx obtained from chart. Per notes from [place], EMS reported SBP > 200 upon arrival to ED. Upon arrival to [place], CTH was performed and showed an intraventricular hemorrhage. Patient was reversed w/ Andexxa and started on cardene gtt, transferred to [facility] for further workup and care. Patient reportedly lost her  in January and had been taking care of him, and has been having difficulty taking care of herself since his passing. ICH 3 on admission. Patient admitted to Neuro ICU for further monitoring and care. Stroke Neurology consulted for further evaluation/workup.      SUBJECTIVE: No events overnight.  No new neurologic complaints.  ROS reported negative unless otherwise noted.    acetaminophen     Tablet .. 650 milliGRAM(s) Oral every 6 hours PRN  anastrozole 1 milliGRAM(s) Oral daily  heparin   Injectable 5000 Unit(s) SubCutaneous every 8 hours  hydrALAZINE 25 milliGRAM(s) Oral three times a day  hydrALAZINE Injectable 10 milliGRAM(s) IV Push every 2 hours PRN  influenza  Vaccine (HIGH DOSE) 0.7 milliLiter(s) IntraMuscular once  labetalol Injectable 10 milliGRAM(s) IV Push every 2 hours PRN  latanoprost 0.005% Ophthalmic Solution 1 Drop(s) Both EYES at bedtime  levothyroxine 75 MICROGram(s) Oral daily  metoprolol tartrate 75 milliGRAM(s) Oral every 8 hours  pantoprazole    Tablet 40 milliGRAM(s) Oral before breakfast  piperacillin/tazobactam IVPB.. 3.375 Gram(s) IV Intermittent every 8 hours  senna 2 Tablet(s) Oral at bedtime PRN  sodium chloride 2 Gram(s) Oral three times a day  urea Oral Powder 15 Gram(s) Oral two times a day  valsartan 80 milliGRAM(s) Oral <User Schedule>      PHYSICAL EXAM:   Vital Signs Last 24 Hrs  T(C): 37 (07 May 2024 08:22), Max: 37.3 (07 May 2024 00:09)  T(F): 98.6 (07 May 2024 08:22), Max: 99.1 (07 May 2024 00:09)  HR: 103 (07 May 2024 08:00) (85 - 120)  BP: 138/109 (07 May 2024 08:00) (138/109 - 166/115)  BP(mean): 117 (07 May 2024 08:00) (100 - 129)  RR: 16 (07 May 2024 08:00) (15 - 20)  SpO2: 97% (07 May 2024 08:00) (97% - 99%)    Parameters below as of 07 May 2024 08:00  Patient On (Oxygen Delivery Method): room air      PENDING  General: No acute distress.     NEUROLOGICAL EXAM:  Mental status: Awake and alert, oriented x2, unable to recall current month (states January), but able to state 2024 and in hospital. Speech fluent, follows commands, no neglect.  Cranial Nerves: No facial asymmetry, no nystagmus, no dysarthria,  tongue midline  Motor exam: Normal tone, no drift, 5/5 RUE with slight pronation but no drift, 4+/5 RLE with drift, 5/5 LUE, 5/5 LLE, normal fine finger movements.  Sensation: Intact to light touch   Coordination: no dysmetria  Gait: deferred    LABS:   05-07    128<L>  |  95<L>  |  44.8<H>  ----------------------------<  130<H>  3.9   |  20.0<L>  |  0.49<L>    Ca    8.7      07 May 2024 05:40    TPro  5.5<L>  /  Alb  3.3  /  TBili  1.1  /  DBili  x   /  AST  18  /  ALT  16  /  AlkPhos  58  05-07        Lipid Profile in AM (04.29.24 @ 06:00)   Cholesterol: 151 mg/dL  Triglycerides, Serum: 62 mg/dL  HDL Cholesterol: 79 mg/dL  Non HDL Cholesterol: 72  LDL Cholesterol Calculated: 60 mg/dL  A1C with Estimated Average Glucose in AM (04.29.24 @ 06:00): 5.3%    RADIOLOGY IMAGING & ADDITIONAL STUDIES (Independently reviewed unless otherwise stated):  CT Head No Cont (05.06.24 @ 17:30)   IMPRESSION: Stable acute left basal ganglia/caudate head hemorrhage   extending into the lateral ventricles. Mildly improved intraventricular   hemorrhage in the occipital horns. Stable mild-to-moderate hydrocephalus.   No new hemorrhage.    MR Head w/wo IV Cont (05.05.24 @ 15:54)   IMPRESSION: Stable left basal ganglia hemorrhage with extension to the lateral ventricle. Stable mild-to-moderate hydrocephalus. No abnormal enhancement to suggest underlying lesion.    CT Head No Cont (05.03.24 @ 02:57)   IMPRESSION: Left caudate nucleus intraparenchymal hemorrhage with intraventricular extension, grossly stable overall. Mild hydrocephalus is grossly stable.    CT Head No Cont (05.01.24 @ 08:30)   IMPRESSION: Stable exam.    TTE W or WO Ultrasound Enhancing Agent (04.30.24 @ 14:03)   CONCLUSIONS:    1. Limited study f/u for LV function.   2. Left ventricular cavity is normal in size. Left ventricular systolic function is normal with an ejection fraction visually estimated at 55 to 60 %.   3. Normal right ventricular cavity size and normal systolic function.   4. Mild to moderate tricuspid regurgitation.   5. No pericardial effusion seen.   6. Estimated pulmonary artery systolic pressure is 60 mmHg, consistent with severe pulmonary hypertension.    CT Head No Cont (04.29.24 @ 20:33)   IMPRESSION: Stable acute small left caudate head intraparenchymal hemorrhage measuring 6 mm. Moderate intraventricular hemorrhage   throughout the left lateral ventricle, third ventricle and fourth ventricle. Mild intraventricular hemorrhage in the right lateral ventricle. Mild to moderate hydrocephalus, unchanged..    US Duplex Venous Lower Ext Complete, Bilateral (04.29.24 @ 08:33) >  IMPRESSION: No evidence of deep venous thrombosis in either lower extremity. Right Baker's cyst.      CT Head No Cont (04.28.24 @ 22:53) >  IMPRESSION: No change in intraventricular hemorrhage and dilatation of the lateral and third ventricles compared with prior exam. Ill-defined hemorrhage again seen in the left caudate body and left caudate head.    CT Angio Brain Stroke Protocol  w/ IV Cont (04.28.24 @ 20:02) >IMPRESSION:  CTA NECK:  No significant stenosis of the cervical carotid arteries based on NASCET criteria. Patent cervical vertebral arteries.  CTA HEAD: No high-grade stenosis or occlusion of the major proximal arterial branches. No evidence of an intracranial arterial aneurysm or   arteriovenous malformation on CTA. No evidence of active bleeding within the left caudate and intraventricular hemorrhage.      CT Brain Stroke Protocol (04.28.24 @ 20:02) >  IMPRESSION:Intraventricular hemorrhage, as described above. Ill-defined parenchymal hemorrhage within the left caudate body and caudate head. Mild dilatation of the lateral and third ventricles.  Critical findings were discussed with Dr. Elizabeth at 8:08 PM on 4/28/2024.     Preliminary note, offical recommendations pending attending review/signature   [facility] Stroke Team  Progress Note     HPI:  82 y/o female with PMHx HTN and A Fib (reportedly only started taking Eliquis x 1 week prior to admission, last dose 4/28 AM), hx of Breast CA? on Anastrazole, who presented to [place] ED complaining of headache on 4/28/24. Patient is a poor historian so hx obtained from chart. Per notes from [place], EMS reported SBP > 200 upon arrival to ED. Upon arrival to [place], CTH was performed and showed an intraventricular hemorrhage. Patient was reversed w/ Andexxa and started on cardene gtt, transferred to [facility] for further workup and care. Patient reportedly lost her  in January and had been taking care of him, and has been having difficulty taking care of herself since his passing. ICH 3 on admission. Patient admitted to Neuro ICU for further monitoring and care. Stroke Neurology consulted for further evaluation/workup.      SUBJECTIVE: No events overnight.  No new neurologic complaints.  ROS reported negative unless otherwise noted.    acetaminophen     Tablet .. 650 milliGRAM(s) Oral every 6 hours PRN  anastrozole 1 milliGRAM(s) Oral daily  heparin   Injectable 5000 Unit(s) SubCutaneous every 8 hours  hydrALAZINE 25 milliGRAM(s) Oral three times a day  hydrALAZINE Injectable 10 milliGRAM(s) IV Push every 2 hours PRN  influenza  Vaccine (HIGH DOSE) 0.7 milliLiter(s) IntraMuscular once  labetalol Injectable 10 milliGRAM(s) IV Push every 2 hours PRN  latanoprost 0.005% Ophthalmic Solution 1 Drop(s) Both EYES at bedtime  levothyroxine 75 MICROGram(s) Oral daily  metoprolol tartrate 75 milliGRAM(s) Oral every 8 hours  pantoprazole    Tablet 40 milliGRAM(s) Oral before breakfast  piperacillin/tazobactam IVPB.. 3.375 Gram(s) IV Intermittent every 8 hours  senna 2 Tablet(s) Oral at bedtime PRN  sodium chloride 2 Gram(s) Oral three times a day  urea Oral Powder 15 Gram(s) Oral two times a day  valsartan 80 milliGRAM(s) Oral <User Schedule>      PHYSICAL EXAM:   Vital Signs Last 24 Hrs  T(C): 37 (07 May 2024 08:22), Max: 37.3 (07 May 2024 00:09)  T(F): 98.6 (07 May 2024 08:22), Max: 99.1 (07 May 2024 00:09)  HR: 103 (07 May 2024 08:00) (85 - 120)  BP: 138/109 (07 May 2024 08:00) (138/109 - 166/115)  BP(mean): 117 (07 May 2024 08:00) (100 - 129)  RR: 16 (07 May 2024 08:00) (15 - 20)  SpO2: 97% (07 May 2024 08:00) (97% - 99%)    Parameters below as of 07 May 2024 08:00  Patient On (Oxygen Delivery Method): room air      General: Sitting in chair. Patient pulled out IV.     NEUROLOGICAL EXAM:  Mental status: Awake and alert, slightly confused to current events, oriented x2, unable to recall month and year. States she knows that she pulled out her IV and that is why she is bleeding. Speech fluent, follows commands, no neglect.  Cranial Nerves: No facial asymmetry, no nystagmus, no dysarthria,  tongue midline  Motor exam: Normal tone, no drift, 5/5 RUE with slight pronation but no drift, 5/5 RLE with drift, 5/5 LUE, 5/5 LLE, normal fine finger movements.  Sensation: Intact to light touch   Coordination: no dysmetria  Gait: deferred    LABS:   05-07    128<L>  |  95<L>  |  44.8<H>  ----------------------------<  130<H>  3.9   |  20.0<L>  |  0.49<L>    Ca    8.7      07 May 2024 05:40    TPro  5.5<L>  /  Alb  3.3  /  TBili  1.1  /  DBili  x   /  AST  18  /  ALT  16  /  AlkPhos  58  05-07        Lipid Profile in AM (04.29.24 @ 06:00)   Cholesterol: 151 mg/dL  Triglycerides, Serum: 62 mg/dL  HDL Cholesterol: 79 mg/dL  Non HDL Cholesterol: 72  LDL Cholesterol Calculated: 60 mg/dL  A1C with Estimated Average Glucose in AM (04.29.24 @ 06:00): 5.3%    RADIOLOGY IMAGING & ADDITIONAL STUDIES (Independently reviewed unless otherwise stated):  CT Head No Cont (05.06.24 @ 17:30)   IMPRESSION: Stable acute left basal ganglia/caudate head hemorrhage   extending into the lateral ventricles. Mildly improved intraventricular   hemorrhage in the occipital horns. Stable mild-to-moderate hydrocephalus.   No new hemorrhage.    MR Head w/wo IV Cont (05.05.24 @ 15:54)   IMPRESSION: Stable left basal ganglia hemorrhage with extension to the lateral ventricle. Stable mild-to-moderate hydrocephalus. No abnormal enhancement to suggest underlying lesion.    CT Head No Cont (05.03.24 @ 02:57)   IMPRESSION: Left caudate nucleus intraparenchymal hemorrhage with intraventricular extension, grossly stable overall. Mild hydrocephalus is grossly stable.    CT Head No Cont (05.01.24 @ 08:30)   IMPRESSION: Stable exam.    TTE W or WO Ultrasound Enhancing Agent (04.30.24 @ 14:03)   CONCLUSIONS:    1. Limited study f/u for LV function.   2. Left ventricular cavity is normal in size. Left ventricular systolic function is normal with an ejection fraction visually estimated at 55 to 60 %.   3. Normal right ventricular cavity size and normal systolic function.   4. Mild to moderate tricuspid regurgitation.   5. No pericardial effusion seen.   6. Estimated pulmonary artery systolic pressure is 60 mmHg, consistent with severe pulmonary hypertension.    CT Head No Cont (04.29.24 @ 20:33)   IMPRESSION: Stable acute small left caudate head intraparenchymal hemorrhage measuring 6 mm. Moderate intraventricular hemorrhage   throughout the left lateral ventricle, third ventricle and fourth ventricle. Mild intraventricular hemorrhage in the right lateral ventricle. Mild to moderate hydrocephalus, unchanged..    US Duplex Venous Lower Ext Complete, Bilateral (04.29.24 @ 08:33) >  IMPRESSION: No evidence of deep venous thrombosis in either lower extremity. Right Baker's cyst.      CT Head No Cont (04.28.24 @ 22:53) >  IMPRESSION: No change in intraventricular hemorrhage and dilatation of the lateral and third ventricles compared with prior exam. Ill-defined hemorrhage again seen in the left caudate body and left caudate head.    CT Angio Brain Stroke Protocol  w/ IV Cont (04.28.24 @ 20:02) >IMPRESSION:  CTA NECK:  No significant stenosis of the cervical carotid arteries based on NASCET criteria. Patent cervical vertebral arteries.  CTA HEAD: No high-grade stenosis or occlusion of the major proximal arterial branches. No evidence of an intracranial arterial aneurysm or   arteriovenous malformation on CTA. No evidence of active bleeding within the left caudate and intraventricular hemorrhage.      CT Brain Stroke Protocol (04.28.24 @ 20:02) >  IMPRESSION:Intraventricular hemorrhage, as described above. Ill-defined parenchymal hemorrhage within the left caudate body and caudate head. Mild dilatation of the lateral and third ventricles.  Critical findings were discussed with Dr. Elizabeth at 8:08 PM on 4/28/2024.       [facility] Stroke Team  Progress Note     HPI:  82 y/o female with PMHx HTN and A Fib (reportedly only started taking Eliquis x 1 week prior to admission, last dose 4/28 AM), hx of Breast CA? on Anastrazole, who presented to [place] ED complaining of headache on 4/28/24. Patient is a poor historian so hx obtained from chart. Per notes from [place], EMS reported SBP > 200 upon arrival to ED. Upon arrival to [place], CTH was performed and showed an intraventricular hemorrhage. Patient was reversed w/ Andexxa and started on cardene gtt, transferred to [facility] for further workup and care. Patient reportedly lost her  in January and had been taking care of him, and has been having difficulty taking care of herself since his passing. ICH 3 on admission. Patient admitted to Neuro ICU for further monitoring and care. Stroke Neurology consulted for further evaluation/workup.      SUBJECTIVE: No events overnight.  No new neurologic complaints.  ROS reported negative unless otherwise noted.    acetaminophen     Tablet .. 650 milliGRAM(s) Oral every 6 hours PRN  anastrozole 1 milliGRAM(s) Oral daily  heparin   Injectable 5000 Unit(s) SubCutaneous every 8 hours  hydrALAZINE 25 milliGRAM(s) Oral three times a day  hydrALAZINE Injectable 10 milliGRAM(s) IV Push every 2 hours PRN  influenza  Vaccine (HIGH DOSE) 0.7 milliLiter(s) IntraMuscular once  labetalol Injectable 10 milliGRAM(s) IV Push every 2 hours PRN  latanoprost 0.005% Ophthalmic Solution 1 Drop(s) Both EYES at bedtime  levothyroxine 75 MICROGram(s) Oral daily  metoprolol tartrate 75 milliGRAM(s) Oral every 8 hours  pantoprazole    Tablet 40 milliGRAM(s) Oral before breakfast  piperacillin/tazobactam IVPB.. 3.375 Gram(s) IV Intermittent every 8 hours  senna 2 Tablet(s) Oral at bedtime PRN  sodium chloride 2 Gram(s) Oral three times a day  urea Oral Powder 15 Gram(s) Oral two times a day  valsartan 80 milliGRAM(s) Oral <User Schedule>      PHYSICAL EXAM:   Vital Signs Last 24 Hrs  T(C): 37 (07 May 2024 08:22), Max: 37.3 (07 May 2024 00:09)  T(F): 98.6 (07 May 2024 08:22), Max: 99.1 (07 May 2024 00:09)  HR: 103 (07 May 2024 08:00) (85 - 120)  BP: 138/109 (07 May 2024 08:00) (138/109 - 166/115)  BP(mean): 117 (07 May 2024 08:00) (100 - 129)  RR: 16 (07 May 2024 08:00) (15 - 20)  SpO2: 97% (07 May 2024 08:00) (97% - 99%)    Parameters below as of 07 May 2024 08:00  Patient On (Oxygen Delivery Method): room air      General: Sitting in chair. Patient pulled out IV.     NEUROLOGICAL EXAM:  Mental status: Awake and alert, slightly confused to current events, oriented x2, unable to recall month and year. States she knows that she pulled out her IV and that is why she is bleeding. Speech fluent, follows commands, no neglect.  Cranial Nerves: No facial asymmetry, no nystagmus, no dysarthria,  tongue midline  Motor exam: Normal tone, no drift, 5/5 RUE with slight pronation but no drift, 5/5 RLE with drift, 5/5 LUE, 5/5 LLE, normal fine finger movements.  Sensation: Intact to light touch   Coordination: no dysmetria  Gait: deferred    LABS:   05-07    128<L>  |  95<L>  |  44.8<H>  ----------------------------<  130<H>  3.9   |  20.0<L>  |  0.49<L>    Ca    8.7      07 May 2024 05:40    TPro  5.5<L>  /  Alb  3.3  /  TBili  1.1  /  DBili  x   /  AST  18  /  ALT  16  /  AlkPhos  58  05-07        Lipid Profile in AM (04.29.24 @ 06:00)   Cholesterol: 151 mg/dL  Triglycerides, Serum: 62 mg/dL  HDL Cholesterol: 79 mg/dL  Non HDL Cholesterol: 72  LDL Cholesterol Calculated: 60 mg/dL  A1C with Estimated Average Glucose in AM (04.29.24 @ 06:00): 5.3%    RADIOLOGY IMAGING & ADDITIONAL STUDIES (Independently reviewed unless otherwise stated):  CT Head No Cont (05.06.24 @ 17:30)   IMPRESSION: Stable acute left basal ganglia/caudate head hemorrhage   extending into the lateral ventricles. Mildly improved intraventricular   hemorrhage in the occipital horns. Stable mild-to-moderate hydrocephalus.   No new hemorrhage.    MR Head w/wo IV Cont (05.05.24 @ 15:54)   IMPRESSION: Stable left basal ganglia hemorrhage with extension to the lateral ventricle. Stable mild-to-moderate hydrocephalus. No abnormal enhancement to suggest underlying lesion.    CT Head No Cont (05.03.24 @ 02:57)   IMPRESSION: Left caudate nucleus intraparenchymal hemorrhage with intraventricular extension, grossly stable overall. Mild hydrocephalus is grossly stable.    CT Head No Cont (05.01.24 @ 08:30)   IMPRESSION: Stable exam.    TTE W or WO Ultrasound Enhancing Agent (04.30.24 @ 14:03)   CONCLUSIONS:    1. Limited study f/u for LV function.   2. Left ventricular cavity is normal in size. Left ventricular systolic function is normal with an ejection fraction visually estimated at 55 to 60 %.   3. Normal right ventricular cavity size and normal systolic function.   4. Mild to moderate tricuspid regurgitation.   5. No pericardial effusion seen.   6. Estimated pulmonary artery systolic pressure is 60 mmHg, consistent with severe pulmonary hypertension.    CT Head No Cont (04.29.24 @ 20:33)   IMPRESSION: Stable acute small left caudate head intraparenchymal hemorrhage measuring 6 mm. Moderate intraventricular hemorrhage   throughout the left lateral ventricle, third ventricle and fourth ventricle. Mild intraventricular hemorrhage in the right lateral ventricle. Mild to moderate hydrocephalus, unchanged..    US Duplex Venous Lower Ext Complete, Bilateral (04.29.24 @ 08:33) >  IMPRESSION: No evidence of deep venous thrombosis in either lower extremity. Right Baker's cyst.      CT Head No Cont (04.28.24 @ 22:53) >  IMPRESSION: No change in intraventricular hemorrhage and dilatation of the lateral and third ventricles compared with prior exam. Ill-defined hemorrhage again seen in the left caudate body and left caudate head.    CT Angio Brain Stroke Protocol  w/ IV Cont (04.28.24 @ 20:02) >IMPRESSION:  CTA NECK:  No significant stenosis of the cervical carotid arteries based on NASCET criteria. Patent cervical vertebral arteries.  CTA HEAD: No high-grade stenosis or occlusion of the major proximal arterial branches. No evidence of an intracranial arterial aneurysm or   arteriovenous malformation on CTA. No evidence of active bleeding within the left caudate and intraventricular hemorrhage.      CT Brain Stroke Protocol (04.28.24 @ 20:02) >  IMPRESSION:Intraventricular hemorrhage, as described above. Ill-defined parenchymal hemorrhage within the left caudate body and caudate head. Mild dilatation of the lateral and third ventricles.  Critical findings were discussed with Dr. Elizabeth at 8:08 PM on 4/28/2024.

## 2024-05-07 NOTE — DIETITIAN NUTRITION RISK NOTIFICATION - ADDITIONAL COMMENTS/DIETITIAN RECOMMENDATIONS
Continue diet as tolerated.  Will trial Magic Cup BID (290 kcal, 9g protein per serving).  Rx: MVI daily.  Encourage po intake, monitor diet tolerance, and provide assistance at meals as needed.  Obtain daily weights to monitor trends.

## 2024-05-07 NOTE — PROGRESS NOTE ADULT - ASSESSMENT
INCOMPLETE  ASSESSMENT:   82 y/o female with PMHx HTN and A Fib (reportedly only started taking Eliquis x 1 week prior to admission, last dose 4/28 AM), hx of Breast CA? on Anastrazole, who presented to [place] ED complaining of headache on 4/28/24. Patient is a poor historian so hx obtained from chart. Per notes from [place], EMS reported SBP > 200 upon arrival to ED. Upon arrival to [place], CTH was performed and showed an intraventricular hemorrhage. Patient was reversed w/ Andexxa and started on cardene gtt, transferred to [facility] for further workup and care.    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 Patient has a hx of noncompliance to meds.    PLAN:  NEURO:   -Neurologically with right sided drift/weakness on today's exam, not noted on prior evaluations yesterday. Repeat head CT was stable.   -Continue close monitoring for neurologic deterioration    -STAT head CT for any neurologic change, especially given patient has hydrocephalus  -Stroke neuro checks q2hrs  -Na goal 140-150 in setting of IPH  -MRI brain w/wo noted above. Will need MRI brain w/ and w/out in 4-6 weeks to rule out any underlying lesion   -TTE noted above  -Transferred to medicine service 5/4/24 for ongoing management of hyponatremia, UTI, afib w/ RVR, hypertension    -DVT ppx: Heparin s.c [x] LMWH [] SCD[]    -Dysphagia screen: pass  -Physical therapy/OT/Speech eval/treatment.     #Stroke prevention  -SBP goal 130-150mmHg, maintain BP under 150/90 mmHg  -ANTITHROMBOTIC THERAPY: Not indicated at this time given IPH/IVH  -AFIB - recommend hold anticoagulation x 4 weeks post-stroke(admitted 4/28/24), then obtain repeat CTH to evaluate for stability prior to resuming Eliquis (Anticoagulation only to be restarted if BP remains stable and if she can demonstrate compliance. Per cardio note: CHADSVasc 4, but currently with acute cerebral bleeding and with recent fall injury/trauma not ideal candidate for long term AC use, consider elective outpatient LAAO)   -Statin therapy not indicated at this time given LDL=60  -HA1C 5.3  -patient should have all age and risk appropriate malignancy screenings with PCP or sooner if clinically suspected   -Stroke education     OTHER:    -condition and plan of care d/w patient, medicine team; questions and concerns addressed.     DISPOSITION: Rehab or home depending on PT eval once stable and workup is complete per primary team    -Further care per primary team.     CORE MEASURES:        Admission NIHSS: 0     Tenecteplase : [] YES [x] NO      LDL/HDL/A1C: 60/79/5.3     Depression Screen- if depression hx and/or present      Statin Therapy: Not indicated at this time, IPH/IVH     Dysphagia Screen: [x] PASS [] FAIL     Smoking [] YES [x] NO      Afib [x] YES [] NO     Stroke Education [x] YES [] NO   ASSESSMENT:   82 y/o female with PMHx HTN and A Fib (reportedly only started taking Eliquis x 1 week prior to admission, last dose 4/28 AM), hx of Breast CA? on Anastrazole, who presented to [facility] ED complaining of headache on 4/28/24. Patient is a poor historian so hx obtained from chart. Per notes from [place], EMS reported SBP > 200 upon arrival to ED. Upon arrival to [place], CTH was performed and showed left caudate IPH with intraventricular extension. Patient was reversed w/ Andexxa and started on cardene gtt, transferred to [facility] for further workup and care.    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 Patient has a hx of noncompliance to meds.    PLAN:  NEURO:   -Neurologically with right sided drift/weakness, again seen. Repeat head CT was stable. Symptoms correlate to where hemorrhage is, and could be in setting of hyponatremia/metabolic cause.   -Continue close monitoring for neurologic deterioration    -STAT head CT for any neurologic change, especially given patient has hydrocephalus  -Stroke neuro checks q2hrs  -Na goal normonatremia. Avoid hyponatremia and rapid fluctuations   -MRI brain w/wo noted above. Will need MRI brain w/ and w/out in 4-6 weeks to rule out any underlying lesion   -TTE noted above  -Transferred to medicine service 5/4/24 for ongoing management of hyponatremia, UTI, afib w/ RVR, hypertension    -DVT ppx: Heparin s.c [x] LMWH [] SCD[]    -Dysphagia screen: pass  -Physical therapy/OT/Speech eval/treatment.     #Stroke prevention  -SBP goal 130-150mmHg, maintain BP under 150/90 mmHg  -ANTITHROMBOTIC THERAPY: Not indicated at this time given IPH/IVH  -AFIB - recommend hold anticoagulation x 4 weeks post-stroke(admitted 4/28/24), then obtain repeat CTH to evaluate for stability prior to resuming Eliquis (Anticoagulation only to be restarted if BP remains stable and if she can demonstrate compliance. Per cardio note: CHADSVasc 4, but currently with acute cerebral bleeding and with recent fall injury/trauma not ideal candidate for long term AC use, consider elective outpatient LAAO)   -Statin therapy not indicated at this time given LDL=60  -HA1C 5.3  -patient should have all age and risk appropriate malignancy screenings with PCP or sooner if clinically suspected   -Stroke education     OTHER:    -condition and plan of care d/w patient, medicine team; questions and concerns addressed.     DISPOSITION: Rehab or home depending on PT eval once stable and workup is complete per primary team    -Further care per primary team.     CORE MEASURES:        Admission NIHSS: 0     Tenecteplase : [] YES [x] NO      LDL/HDL/A1C: 60/79/5.3     Depression Screen- if depression hx and/or present      Statin Therapy: Not indicated at this time, IPH/IVH     Dysphagia Screen: [x] PASS [] FAIL     Smoking [] YES [x] NO      Afib [x] YES [] NO     Stroke Education [x] YES [] NO

## 2024-05-07 NOTE — PROGRESS NOTE ADULT - PROBLEM SELECTOR PLAN 2
- EMS reported SBP > 200 upon arrival to ED  - In -151  - SBP goal per neuro is 130-150  - BP goal per neuro < 150/90  - at goal

## 2024-05-07 NOTE — PROGRESS NOTE ADULT - ASSESSMENT
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    P) Po meds same to have  trial tolvaptan follow up labs.

## 2024-05-07 NOTE — PROGRESS NOTE ADULT - ASSESSMENT
82 y/o female with PMHx HTN, Breast Ca (dx 2020, surgery, no chemo/radiation) and A Fib (started taking  Eliquis 1 week ago, last dose 4/28 AM) BIBA to [facility]  from home due to headache. Patient had recently been admitted due to HTN after running out of her coreg. CT head found with intraventricular bleed and was transferred to [facility] ED for further evaluation. Pt was initially admitted to NICU team then downgraded to step down under neurology team. She was given Andexxa for Eliquis reversal and BP was controlled with nicardipine for SBP > 200s. Course was complicated by Afib rvr, hyponatremia and uti w/leukocytosis .Pt was started IV Rocephin today. Repeat CTH on 05/03 stable. TTE ef 55-60%,mild to mod TR. Cardiology and nephrology are following Pt will be transfer to medicine team from neurology team.   Of note patient endorses that she had fallen and hit her head a couple of days before initiation of Eliquis but did not communicate to team at that time.  AC on hold for at least 14 days per neuro. Pend MRI  brain pending    1-IVH/IPH  Suspect secondary to HTN emergency with underlying medication induced coagulopathy from Eliquis,  S/P recent fall  -repeat cth stable   BP controlled   cont neurocheck   Mr of brain stable bleed extention to ventricule   repeat CT of head 5/6 improving      2-Hypertensive urgency  -s/p cardene drip  --SBP goal per neuro is 130-150  -continue hydralazine, metoprolol, valsartan  -Monitor BP      3-Hyponatremia  - started on sodium bicarb TID and urea BID  - on 800mL/day fluid restriction  - f/u Nephrology rec    4-UTI /e coli  cont iv abx rocephin   trend leukocytosis     5-Sepsis due to UTI   resolved   - IDconsult appreciated    6-A fib with RVR   rate control  cont metoprolol adjust dose   -TSH normal  - [id number]  -Hold AC for ICH  -F/U cardiology rec      7-HX Breast cancer   -dx 2020, surgery, no chemo/radiation  -on Anastrozole    8-Hypothyroidism  -c/w synthroid  -tsh stable    9- Severe protein lupillo malnutrition   cont diet   add ensure with meals   MVI     dvt ppx sq heparin  gi ppx PPI    discharge planing to [facility]

## 2024-05-07 NOTE — PROGRESS NOTE ADULT - SUBJECTIVE AND OBJECTIVE BOX
INFECTIOUS DISEASES AND INTERNAL MEDICINE at [facility]  =======================================================  Tim Huertas MD  Diplomates American Board of Internal Medicine and Infectious Diseases  Telephone [phone]  Fax            [phone]  =======================================================    JUMA DOMINGUEZ [id number]    Follow up: UTI     Allergies:  No Known Allergies      Medications:  acetaminophen     Tablet .. 650 milliGRAM(s) Oral every 6 hours PRN  anastrozole 1 milliGRAM(s) Oral daily  cefTRIAXone Injectable. 1000 milliGRAM(s) IV Push every 24 hours  heparin   Injectable 5000 Unit(s) SubCutaneous every 8 hours  hydrALAZINE 25 milliGRAM(s) Oral three times a day  hydrALAZINE Injectable 10 milliGRAM(s) IV Push every 2 hours PRN  influenza  Vaccine (HIGH DOSE) 0.7 milliLiter(s) IntraMuscular once  labetalol Injectable 10 milliGRAM(s) IV Push every 2 hours PRN  latanoprost 0.005% Ophthalmic Solution 1 Drop(s) Both EYES at bedtime  levothyroxine 75 MICROGram(s) Oral daily  metoprolol tartrate 75 milliGRAM(s) Oral every 6 hours  pantoprazole    Tablet 40 milliGRAM(s) Oral before breakfast  potassium chloride    Tablet ER 20 milliEquivalent(s) Oral daily  senna 2 Tablet(s) Oral at bedtime PRN  sodium chloride 2 Gram(s) Oral three times a day  tolvaptan 15 milliGRAM(s) Oral daily  urea Oral Powder 15 Gram(s) Oral two times a day  valsartan 80 milliGRAM(s) Oral <User Schedule>    SOCIAL       FAMILY   FAMILY HISTORY:    REVIEW OF SYSTEMS:  CONSTITUTIONAL:  No Fever or chills  HEENT:   No diplopia or blurred vision.  No earache, sore throat or runny nose.  CARDIOVASCULAR:  No pressure, squeezing, strangling, tightness, heaviness or aching about the chest, neck, axilla or epigastrium.  RESPIRATORY:  No cough, shortness of breath, PND or orthopnea.  GASTROINTESTINAL:  No nausea, vomiting or diarrhea.  GENITOURINARY:  No dysuria, frequency or urgency. No Blood in urine  MUSCULOSKELETAL:   moves all joints  SKIN:  No change in skin, hair or nails.  NEUROLOGIC:  No paresthesias, fasciculations, seizures or weakness.  PSYCHIATRIC:  No disorder of thought or mood.  ENDOCRINE:  No heat or cold intolerance, polyuria or polydipsia.  HEMATOLOGICAL:  No easy bruising or bleeding.            Physical Exam:  ICU Vital Signs Last 24 Hrs  T(C): 37 (07 May 2024 08:22), Max: 37.3 (07 May 2024 00:09)  T(F): 98.6 (07 May 2024 08:22), Max: 99.1 (07 May 2024 00:09)  HR: 101 (07 May 2024 14:00) (101 - 120)  BP: 127/103 (07 May 2024 14:00) (127/103 - 166/115)  BP(mean): 113 (07 May 2024 14:00) (102 - 129)  ABP: --  ABP(mean): --  RR: 17 (07 May 2024 14:00) (15 - 20)  SpO2: 100% (07 May 2024 14:00) (97% - 100%)    O2 Parameters below as of 07 May 2024 14:00  Patient On (Oxygen Delivery Method): room air          GEN: NAD,   HEENT: normocephalic and atraumatic. EOMI. BRANDI.    NECK: Supple. No carotid bruits.  No lymphadenopathy or thyromegaly.  LUNGS: Clear to auscultation.  HEART: Regular rate and rhythm without murmur.  ABDOMEN: Soft, nontender, and nondistended.  Positive bowel sounds.    : No CVA tenderness  EXTREMITIES: Without any cyanosis, clubbing, rash, lesions or edema.  MSK: no joint swelling  NEUROLOGIC: Cranial nerves II through XII are grossly intact.  PSYCHIATRIC: Appropriate affect .  SKIN: No ulceration or induration present.        Labs:  Vitals:  ============  T(F): 98.6 (07 May 2024 08:22), Max: 99.1 (07 May 2024 00:09)  HR: 101 (07 May 2024 14:00)  BP: 127/103 (07 May 2024 14:00)  RR: 17 (07 May 2024 14:00)  SpO2: 100% (07 May 2024 14:00) (97% - 100%)  temp max in last 48H T(F): , Max: 99.1 (05-07-24 @ 00:09)    =======================================================  Current Antibiotics:  cefTRIAXone Injectable. 1000 milliGRAM(s) IV Push every 24 hours    Other medications:  anastrozole 1 milliGRAM(s) Oral daily  heparin   Injectable 5000 Unit(s) SubCutaneous every 8 hours  hydrALAZINE 25 milliGRAM(s) Oral three times a day  influenza  Vaccine (HIGH DOSE) 0.7 milliLiter(s) IntraMuscular once  latanoprost 0.005% Ophthalmic Solution 1 Drop(s) Both EYES at bedtime  levothyroxine 75 MICROGram(s) Oral daily  metoprolol tartrate 75 milliGRAM(s) Oral every 6 hours  pantoprazole    Tablet 40 milliGRAM(s) Oral before breakfast  potassium chloride    Tablet ER 20 milliEquivalent(s) Oral daily  sodium chloride 2 Gram(s) Oral three times a day  tolvaptan 15 milliGRAM(s) Oral daily  urea Oral Powder 15 Gram(s) Oral two times a day  valsartan 80 milliGRAM(s) Oral <User Schedule>      =======================================================  Labs:    05-07    128<L>  |  95<L>  |  44.8<H>  ----------------------------<  130<H>  3.9   |  20.0<L>  |  0.49<L>    Ca    8.7      07 May 2024 05:40    TPro  5.5<L>  /  Alb  3.3  /  TBili  1.1  /  DBili  x   /  AST  18  /  ALT  16  /  AlkPhos  58  05-07      Culture - Blood (collected 05-05-24 @ 09:10)  Source: .Blood Blood  Preliminary Report (05-06-24 @ 17:02):    No growth at 24 hours    Culture - Blood (collected 05-04-24 @ 12:08)  Source: .Blood Blood-Peripheral  Preliminary Report (05-06-24 @ 22:01):    No growth at 48 Hours    Culture - Blood (collected 05-04-24 @ 11:42)  Source: .Blood Blood-Peripheral  Preliminary Report (05-06-24 @ 22:01):    No growth at 48 Hours    Culture - Urine (collected 05-04-24 @ 03:15)  Source: Clean Catch Clean Catch (Midstream)  Final Report (05-06-24 @ 13:20):    >100,000 CFU/ml Escherichia coli  Organism: Escherichia coli (05-06-24 @ 13:20)  Organism: Escherichia coli (05-06-24 @ 13:20)    Sensitivities:      Method Type: LEXI      -  Amoxicillin/Clavulanic Acid: S <=8/4      -  Ampicillin: R >16 These ampicillin results predict results for amoxicillin      -  Ampicillin/Sulbactam: I 16/8      -  Aztreonam: S <=4      -  Cefazolin: S <=2 For uncomplicated UTI with K. pneumoniae, E. coli, or P. mirablis: LEXI <=16 is sensitive and LEXI >=32 is resistant. This also predicts results for oral agents cefaclor, cefdinir, cefpodoxime, cefprozil, cefuroxime axetil, cephalexin and locarbef for uncomplicated UTI. Note that some isolates may be susceptible to these agents while testing resistant to cefazolin.      -  Cefepime: S <=2      -  Cefoxitin: S <=8      -  Ceftriaxone: S <=1      -  Cefuroxime: S <=4      -  Ciprofloxacin: S <=0.25      -  Ertapenem: S <=0.5      -  Gentamicin: S <=2      -  Imipenem: S <=1      -  Levofloxacin: S <=0.5      -  Meropenem: S <=1      -  Nitrofurantoin: S <=32 Should not be used to treat pyelonephritis      -  Piperacillin/Tazobactam: S <=8      -  Tobramycin: S <=2      -  Trimethoprim/Sulfamethoxazole: S <=0.5/9.5      Creatinine: 0.49 mg/dL (05-07-24 @ 05:40)  Creatinine: 0.37 mg/dL (05-06-24 @ 06:44)  Creatinine: 0.49 mg/dL (05-05-24 @ 09:10)  Creatinine: 0.59 mg/dL (05-04-24 @ 12:08)  Creatinine: 0.38 mg/dL (05-04-24 @ 02:25)  Creatinine: 0.44 mg/dL (05-03-24 @ 20:57)  Creatinine: 0.42 mg/dL (05-03-24 @ 15:33)  Creatinine: 0.45 mg/dL (05-03-24 @ 09:21)  Creatinine: <0.20 mg/dL (05-03-24 @ 04:33)  Creatinine: 0.38 mg/dL (05-02-24 @ 19:20)            WBC Count: 17.18 K/uL (05-05-24 @ 09:10)  WBC Count: 25.36 K/uL (05-04-24 @ 02:25)  WBC Count: 14.01 K/uL (05-03-24 @ 04:41)        Alkaline Phosphatase: 58 U/L (05-07-24 @ 05:40)  Alkaline Phosphatase: 57 U/L (05-06-24 @ 06:44)  Alkaline Phosphatase: 66 U/L (05-05-24 @ 09:10)  Alanine Aminotransferase (ALT/SGPT): 16 U/L (05-07-24 @ 05:40)  Alanine Aminotransferase (ALT/SGPT): 16 U/L (05-06-24 @ 06:44)  Alanine Aminotransferase (ALT/SGPT): 18 U/L (05-05-24 @ 09:10)  Aspartate Aminotransferase (AST/SGOT): 18 U/L (05-07-24 @ 05:40)  Aspartate Aminotransferase (AST/SGOT): 17 U/L (05-06-24 @ 06:44)  Aspartate Aminotransferase (AST/SGOT): 18 U/L (05-05-24 @ 09:10)  Bilirubin Total: 1.1 mg/dL (05-07-24 @ 05:40)  Bilirubin Total: 0.9 mg/dL (05-06-24 @ 06:44)  Bilirubin Total: 0.8 mg/dL (05-05-24 @ 09:10)

## 2024-05-07 NOTE — CHART NOTE - NSCHARTNOTEFT_GEN_A_CORE
Source: Patient [ x]  Family [ ]   other [ x]    Current Diet: Diet, Regular:   1000mL Fluid Restriction ([id number]) (05-06-24 @ 10:32)      Patient reports [ ] nausea  [ ] vomiting [ ] diarrhea [ ] constipation  [ ]chewing problems [ ] swallowing issues  [ ] other:     PO intake:  < 50% [x ]   50-75%  [ ]   %  [ ]  other :    Current Weight:   (4/8) 154.9 lbs  No new weight; unable to take bedscale weight as pt OOB in chair  Noted with 1+ b/l leg edema, continue to trend     Pertinent Medications: MEDICATIONS  (STANDING):  anastrozole 1 milliGRAM(s) Oral daily  levothyroxine 75 MICROGram(s) Oral daily  pantoprazole    Tablet 40 milliGRAM(s) Oral before breakfast  piperacillin/tazobactam IVPB.. 3.375 Gram(s) IV Intermittent every 8 hours  sodium chloride 2 Gram(s) Oral three times a day    MEDICATIONS  (PRN):  senna 2 Tablet(s) Oral at bedtime PRN Constipation    Pertinent Labs: 05-07 Na128 mmol/L<L> Glu 130 mg/dL<H> K+ 3.9 mmol/L Cr  0.49 mg/dL<L> BUN 44.8 mg/dL<H> Phos n/a   Alb 3.3 g/dL PAB n/a       Skin: Surgical incision to right flank per documentation  Sebastián: 14    Nutrition focused physical exam conducted - found signs of malnutrition [ ]absent [ x]present    Subcutaneous fat loss: [x ] Orbital fat pads region, [ ]Buccal fat region, [x ]Triceps region,  [ ]Ribs region    Muscle wasting: [ x]Temples region, [ x]Clavicle region, [x ]Shoulder region, [ ]Scapula region, [ ]Interosseous region,  [ ]thigh region, [ ]Calf region    Estimated Needs:   [x ] no change since previous assessment  [ ] recalculated:     Current Nutrition Diagnosis: Pt remains at high nutrition risk secondary to malnutrition (severe, acute) related to inability to meet sufficient protein-energy in setting of poor appetite, IVH/IPH, UTI as evidenced by meeting <50% nutrient needs >5 days, moderate muscle loss of temples, clavicles, shoulders, moderate fat loss of orbitals and triceps. Pt reports poor appetite/po intake continues. Breakfast tray observed at bedside, entree basically untouched. Pt consumed some fruit and orange juice; states she does not want anything else to eat. Fluid restriction in place for hyponatremia. RD to follow up as feasible.     Recommendations:   1) Continue diet as tolerated.  2) Will trial Magic Cup BID (290 kcal, 9g protein per serving).  3) Rx: MVI daily.  4) Encourage po intake, monitor diet tolerance, and provide assistance at meals as needed.  5) Obtain daily weights to monitor trends.     Monitoring and Evaluation:   [ x] PO intake [x ] Tolerance to diet prescription [X] Weights  [X] Follow up per protocol [X] Labs: chem 8, mg, phos, H/H

## 2024-05-08 LAB
ALBUMIN SERPL ELPH-MCNC: 3.4 G/DL — SIGNIFICANT CHANGE UP (ref 3.3–5.2)
ALP SERPL-CCNC: 60 U/L — SIGNIFICANT CHANGE UP (ref 40–120)
ALT FLD-CCNC: 19 U/L — SIGNIFICANT CHANGE UP
ANION GAP SERPL CALC-SCNC: 10 MMOL/L — SIGNIFICANT CHANGE UP (ref 5–17)
AST SERPL-CCNC: 21 U/L — SIGNIFICANT CHANGE UP
BILIRUB SERPL-MCNC: 0.8 MG/DL — SIGNIFICANT CHANGE UP (ref 0.4–2)
BUN SERPL-MCNC: 35.9 MG/DL — HIGH (ref 8–20)
CALCIUM SERPL-MCNC: 8.6 MG/DL — SIGNIFICANT CHANGE UP (ref 8.4–10.5)
CHLORIDE SERPL-SCNC: 98 MMOL/L — SIGNIFICANT CHANGE UP (ref 96–108)
CO2 SERPL-SCNC: 23 MMOL/L — SIGNIFICANT CHANGE UP (ref 22–29)
CREAT SERPL-MCNC: 0.45 MG/DL — LOW (ref 0.5–1.3)
EGFR: 94 ML/MIN/1.73M2 — SIGNIFICANT CHANGE UP
GLUCOSE SERPL-MCNC: 124 MG/DL — HIGH (ref 70–99)
HCT VFR BLD CALC: 33.6 % — LOW (ref 34.5–45)
HGB BLD-MCNC: 11.6 G/DL — SIGNIFICANT CHANGE UP (ref 11.5–15.5)
MCHC RBC-ENTMCNC: 30.1 PG — SIGNIFICANT CHANGE UP (ref 27–34)
MCHC RBC-ENTMCNC: 34.5 GM/DL — SIGNIFICANT CHANGE UP (ref 32–36)
MCV RBC AUTO: 87 FL — SIGNIFICANT CHANGE UP (ref 80–100)
NT-PROBNP SERPL-SCNC: 5346 PG/ML — HIGH (ref 0–300)
PHOSPHATE SERPL-MCNC: 2.9 MG/DL — SIGNIFICANT CHANGE UP (ref 2.4–4.7)
PLATELET # BLD AUTO: 305 K/UL — SIGNIFICANT CHANGE UP (ref 150–400)
POTASSIUM SERPL-MCNC: 3.8 MMOL/L — SIGNIFICANT CHANGE UP (ref 3.5–5.3)
POTASSIUM SERPL-SCNC: 3.8 MMOL/L — SIGNIFICANT CHANGE UP (ref 3.5–5.3)
PROT SERPL-MCNC: 5.5 G/DL — LOW (ref 6.6–8.7)
RBC # BLD: 3.86 M/UL — SIGNIFICANT CHANGE UP (ref 3.8–5.2)
RBC # FLD: 14.1 % — SIGNIFICANT CHANGE UP (ref 10.3–14.5)
SODIUM SERPL-SCNC: 131 MMOL/L — LOW (ref 135–145)
WBC # BLD: 13.18 K/UL — HIGH (ref 3.8–10.5)
WBC # FLD AUTO: 13.18 K/UL — HIGH (ref 3.8–10.5)

## 2024-05-08 PROCEDURE — 99222 1ST HOSP IP/OBS MODERATE 55: CPT

## 2024-05-08 PROCEDURE — 99232 SBSQ HOSP IP/OBS MODERATE 35: CPT | Mod: 25

## 2024-05-08 PROCEDURE — 99232 SBSQ HOSP IP/OBS MODERATE 35: CPT

## 2024-05-08 PROCEDURE — 71275 CT ANGIOGRAPHY CHEST: CPT | Mod: 26

## 2024-05-08 PROCEDURE — 93010 ELECTROCARDIOGRAM REPORT: CPT

## 2024-05-08 RX ORDER — TRIAMTERENE/HYDROCHLOROTHIAZID 75 MG-50MG
1 TABLET ORAL DAILY
Refills: 0 | Status: DISCONTINUED | OUTPATIENT
Start: 2024-05-08 | End: 2024-05-09

## 2024-05-08 RX ORDER — DILTIAZEM HCL 120 MG
60 CAPSULE, EXT RELEASE 24 HR ORAL EVERY 6 HOURS
Refills: 0 | Status: DISCONTINUED | OUTPATIENT
Start: 2024-05-08 | End: 2024-05-08

## 2024-05-08 RX ORDER — METOPROLOL TARTRATE 50 MG
5 TABLET ORAL ONCE
Refills: 0 | Status: COMPLETED | OUTPATIENT
Start: 2024-05-08 | End: 2024-05-08

## 2024-05-08 RX ORDER — DILTIAZEM HCL 120 MG
30 CAPSULE, EXT RELEASE 24 HR ORAL EVERY 6 HOURS
Refills: 0 | Status: COMPLETED | OUTPATIENT
Start: 2024-05-08 | End: 2024-05-09

## 2024-05-08 RX ORDER — VALSARTAN 80 MG/1
160 TABLET ORAL EVERY 12 HOURS
Refills: 0 | Status: DISCONTINUED | OUTPATIENT
Start: 2024-05-08 | End: 2024-05-13

## 2024-05-08 RX ORDER — DILTIAZEM HCL 120 MG
40 CAPSULE, EXT RELEASE 24 HR ORAL EVERY 6 HOURS
Refills: 0 | Status: DISCONTINUED | OUTPATIENT
Start: 2024-05-08 | End: 2024-05-08

## 2024-05-08 RX ADMIN — Medication 75 MILLIGRAM(S): at 05:27

## 2024-05-08 RX ADMIN — Medication 75 MILLIGRAM(S): at 00:06

## 2024-05-08 RX ADMIN — SODIUM CHLORIDE 2 GRAM(S): 9 INJECTION INTRAMUSCULAR; INTRAVENOUS; SUBCUTANEOUS at 13:09

## 2024-05-08 RX ADMIN — Medication 75 MICROGRAM(S): at 05:27

## 2024-05-08 RX ADMIN — Medication 30 MILLIGRAM(S): at 19:35

## 2024-05-08 RX ADMIN — LATANOPROST 1 DROP(S): 0.05 SOLUTION/ DROPS OPHTHALMIC; TOPICAL at 22:11

## 2024-05-08 RX ADMIN — TOLVAPTAN 15 MILLIGRAM(S): 15 TABLET ORAL at 11:48

## 2024-05-08 RX ADMIN — Medication 10 MILLIGRAM(S): at 08:04

## 2024-05-08 RX ADMIN — Medication 10 MILLIGRAM(S): at 06:20

## 2024-05-08 RX ADMIN — CEFTRIAXONE 1000 MILLIGRAM(S): 500 INJECTION, POWDER, FOR SOLUTION INTRAMUSCULAR; INTRAVENOUS at 11:46

## 2024-05-08 RX ADMIN — HEPARIN SODIUM 5000 UNIT(S): 5000 INJECTION INTRAVENOUS; SUBCUTANEOUS at 05:26

## 2024-05-08 RX ADMIN — Medication 25 MILLIGRAM(S): at 13:09

## 2024-05-08 RX ADMIN — SODIUM CHLORIDE 2 GRAM(S): 9 INJECTION INTRAMUSCULAR; INTRAVENOUS; SUBCUTANEOUS at 22:12

## 2024-05-08 RX ADMIN — VALSARTAN 160 MILLIGRAM(S): 80 TABLET ORAL at 17:51

## 2024-05-08 RX ADMIN — Medication 5 MILLIGRAM(S): at 01:56

## 2024-05-08 RX ADMIN — HEPARIN SODIUM 5000 UNIT(S): 5000 INJECTION INTRAVENOUS; SUBCUTANEOUS at 13:09

## 2024-05-08 RX ADMIN — Medication 1 TABLET(S): at 14:23

## 2024-05-08 RX ADMIN — VALSARTAN 80 MILLIGRAM(S): 80 TABLET ORAL at 05:27

## 2024-05-08 RX ADMIN — Medication 75 MILLIGRAM(S): at 17:51

## 2024-05-08 RX ADMIN — Medication 20 MILLIEQUIVALENT(S): at 11:46

## 2024-05-08 RX ADMIN — ANASTROZOLE 1 MILLIGRAM(S): 1 TABLET ORAL at 11:48

## 2024-05-08 RX ADMIN — HEPARIN SODIUM 5000 UNIT(S): 5000 INJECTION INTRAVENOUS; SUBCUTANEOUS at 22:11

## 2024-05-08 RX ADMIN — Medication 25 MILLIGRAM(S): at 22:12

## 2024-05-08 RX ADMIN — Medication 25 MILLIGRAM(S): at 05:27

## 2024-05-08 RX ADMIN — Medication 15 GRAM(S): at 05:26

## 2024-05-08 RX ADMIN — Medication 75 MILLIGRAM(S): at 11:46

## 2024-05-08 RX ADMIN — SODIUM CHLORIDE 2 GRAM(S): 9 INJECTION INTRAMUSCULAR; INTRAVENOUS; SUBCUTANEOUS at 05:27

## 2024-05-08 RX ADMIN — Medication 10 MILLIGRAM(S): at 10:29

## 2024-05-08 RX ADMIN — PANTOPRAZOLE SODIUM 40 MILLIGRAM(S): 20 TABLET, DELAYED RELEASE ORAL at 05:40

## 2024-05-08 RX ADMIN — Medication 15 GRAM(S): at 17:51

## 2024-05-08 NOTE — PROGRESS NOTE ADULT - ASSESSMENT
82 y/o female with PMHx HTN, Breast Ca (dx 2020, surgery, no chemo/radiation) and A Fib (started taking  Eliquisx1 week ago, last dose 4/28 AM) BIBA to [facility]  from home due to headache. Patient had recently been admitted due to HTN after running out of her coreg. CT head found with intraventricular bleed and was transferred to [facility] ED for further evaluation. She was given Andexxa for Eliquis reversal and BP was controlled with nicardipine for SBP > 200s. She recently lost her  and has not been compliant with medications. She is now weaned off nicardipine. She denies chest pain, back pain, SOB, LOVELACE, diaphoresis, syncope or N/V. Of note patient endorses that she had fallen and hit her head a couple of days before initiation of Eliquis but did not communicate to team at that time.

## 2024-05-08 NOTE — PROGRESS NOTE ADULT - SUBJECTIVE AND OBJECTIVE BOX
[facility]                                                         CARDIOLOGY AT [facility]                                                                  [address] [facility] [place] [place] [address]                                                         Telephone: [phone]. Fax:[phone]                                                                             PROGRESS NOTE    Reason for follow up: ICH, Afib with RVR, Hypertension  Update: patient Afib RVR still in spite of increased medications. Plan CTA to rule out PE       Review of symptoms:   Cardiac:  No chest pain. No dyspnea. No palpitations.  Respiratory: no cough. No dyspnea  Gastrointestinal: No diarrhea. No abdominal pain. No bleeding.   Neuro: No focal neuro complaints.    Vitals:  T(C): 37.6 (05-08-24 @ 07:46), Max: 37.6 (05-08-24 @ 07:46)  HR: 97 (05-08-24 @ 09:15) (97 - 129)  BP: 163/86 (05-08-24 @ 09:15) (106/75 - 166/121)  RR: 18 (05-08-24 @ 09:15) (16 - 20)  SpO2: 97% (05-08-24 @ 09:15) (96% - 100%)  Wt(kg): --  I&O's Summary    07 May 2024 07:01  -  08 May 2024 07:00  --------------------------------------------------------  IN: 1200 mL / OUT: 2720 mL / NET: -1520 mL          PHYSICAL EXAM:  Appearance: Comfortable. No acute distress  HEENT:  Atraumatic. Normocephalic.  Normal oral mucosa  Neurologic: A & O x 3, no gross focal deficits.  Cardiovascular: irreg irregular S1 S2, No murmur, no rubs/gallops. No JVD  Respiratory: Lungs clear to auscultation, unlabored   Gastrointestinal:  Soft, Non-tender, + BS  Lower Extremities: 2+ Peripheral Pulses, No clubbing, cyanosis, or edema  Psychiatry: Patient is calm. No agitation.   Skin: warm and dry.    CURRENT CARDIAC MEDICATIONS:  hydrALAZINE 25 milliGRAM(s) Oral three times a day  hydrALAZINE Injectable 10 milliGRAM(s) IV Push every 2 hours PRN  labetalol Injectable 10 milliGRAM(s) IV Push every 2 hours PRN  metoprolol tartrate 75 milliGRAM(s) Oral every 6 hours  tolvaptan 15 milliGRAM(s) Oral daily  urea Oral Powder 15 Gram(s) Oral two times a day  valsartan 80 milliGRAM(s) Oral <User Schedule>      CURRENT OTHER MEDICATIONS:  cefTRIAXone Injectable. 1000 milliGRAM(s) IV Push every 24 hours  acetaminophen     Tablet .. 650 milliGRAM(s) Oral every 6 hours PRN Mild Pain (1 - 3)  pantoprazole    Tablet 40 milliGRAM(s) Oral before breakfast  senna 2 Tablet(s) Oral at bedtime PRN Constipation  levothyroxine 75 MICROGram(s) Oral daily  anastrozole 1 milliGRAM(s) Oral daily  heparin   Injectable 5000 Unit(s) SubCutaneous every 8 hours  influenza  Vaccine (HIGH DOSE) 0.7 milliLiter(s) IntraMuscular once  latanoprost 0.005% Ophthalmic Solution 1 Drop(s) Both EYES at bedtime  potassium chloride    Tablet ER 20 milliEquivalent(s) Oral daily  sodium chloride 2 Gram(s) Oral three times a day      LABS:	 	                            11.6   13.18 )-----------( 305      ( 08 May 2024 05:49 )             33.6     05-08    131<L>  |  98  |  35.9<H>  ----------------------------<  124<H>  3.8   |  23.0  |  0.45<L>    Ca    8.6      08 May 2024 05:49  Phos  2.9     05-08    TPro  5.5<L>  /  Alb  3.4  /  TBili  0.8  /  DBili  x   /  AST  21  /  ALT  19  /  AlkPhos  60  05-08    PT/INR/PTT ( 29 Apr 2024 06:00 )                       :                       :      13.8         :       27.9                  .        .                   .              .           .       1.25        .                                       Lipid Profile: Date: 04-29 @ 06:00  Total cholesterol 151; Direct LDL: --; HDL: 79; Triglycerides:62    HgA1c:   TSH:     TELEMETRY: Afib -150s  ECG:    DIAGNOSTIC TESTING:  [ ] Echocardiogram: < from: TTE W or WO Ultrasound Enhancing Agent (04.30.24 @ 14:03) >  TRANSTHORACIC ECHOCARDIOGRAM REPORT  ________________________________________________________________________________                                      _______       Pt. Name:       JUMA DOMINGUEZ Study Date:    4/30/2024  MRN:            [id number]    YOB: 1940  Accession #:    [id number]    Age:           83 years  Account#:       [id number]   Gender:        F  Visit ID#  Heart Rate:                  Height:        64.96 in (165.00 cm)  Rhythm:                      Weight:132.28 lb (60.00 kg)  Blood Pressure: 132/93 mmHg  BSA/BMI:       1.66 m² / 22.04 kg/m²  ________________________________________________________________________________________  Referring Physician:    Surendra Smart  Interpreting Physician: Bryan Millan MD  Primary Sonographer:    Immanuel Pate    CPT:               ECHO TTE W/O CON F/U LTD - 37063.m;LIMITED SPECTRAL -                     40716.m;DOPPL ECHO COLOR FLOW - 39768.m  Indication(s):     Abnormal electrocardiogram ECG/EKG - R94.31  Procedure:         Limited transthoracic echocardiogram.  Ordering Location: [facility]  Admission Status:  Inpatient  UEA Reaction:      Patient had a rash after injection of Ultrasound Enhancing                     Agent.  Study Information: Image quality for this study is fair.    _______________________________________________________________________________________     CONCLUSIONS:      1. Limited study f/u for LV function.   2. Left ventricular cavity is normal in size. Left ventricular systolic function is normal with an ejection fraction visually estimated at 55 to 60 %.   3. Normal right ventricular cavity size and normal systolic function.   4. Mild to moderate tricuspid regurgitation.   5. No pericardial effusion seen.   6. Estimated pulmonary artery systolic pressure is 60 mmHg, consistent with severe pulmonary hypertension.    ________________________________________________________________________________________  FINDINGS:    < end of copied text >    [ ]  Catheterization:   [ ] Stress Test:    OTHER:

## 2024-05-08 NOTE — CONSULT NOTE ADULT - ASSESSMENT
84yo female with HTN, HLD, Breast CA s/p surgery 2020 (no chemo/radiation) and AF recently prescribed Eliquis who was BIBA to [facility] from home due to headache found to be HTN with SBP>200 with an intraventricular bleed. He was transferred to [facility] ED for further evaluation. She reports hitting her head a couple of days before initiation of Eliquis. Hospital course complicated by AF w/ RVR, hyponatremia, and UTI w/leukocytosis. TTE with preserved EF 55-60%, mild-mod TR. Pt was started on Metoprolol (currently on 75mg every 6 hours) with difficult to control rates for which EP was consulted for.  84yo female with HTN, HLD, Breast CA s/p surgery 2020 (no chemo/radiation) and AF recently prescribed Eliquis who was BIBA to [facility] from home due to headache found to be HTN with SBP>200 with an intraventricular bleed. He was transferred to [facility] ED for further evaluation. She reports hitting her head a couple of days before initiation of Eliquis. Hospital course complicated by AF w/ RVR, hyponatremia, and UTI w/leukocytosis. TTE with preserved EF 55-60%, mild-mod TR. Pt was started on Metoprolol (currently on 75mg every 6 hours) with difficult to control rates for which EP was consulted for.       Recommendations:    #Atrial fibrillation  - Continue to monitor on telemetry   - [id number] = 4; age, sex, HTN. Recommend restarting therapeutic AC once cleared by Neurology team. Would benefit from LAAO evaluation, which can be done in the outpatient setting. Would obtain CT HARLAN while in house to expedite if patient is agreeable.   - Heart rate poorly controlled on Metoprolol 75mg every 6 hours. Recommend starting Cardizem at 60mg every 6hours.   - TSH normal  - TTE with preserved EF    INCOMPLETE NOTE; PATIENT NOT SEEN    82yo female with HTN, HLD, Breast CA s/p surgery 2020 (no chemo/radiation) and AF recently prescribed Eliquis who was BIBA to [facility] from home due to headache found to be HTN with SBP>200 with an intraventricular bleed. He was transferred to [facility] ED for further evaluation. She reports hitting her head a couple of days before initiation of Eliquis. Hospital course complicated by AF w/ RVR, hyponatremia, and UTI w/leukocytosis. TTE with preserved EF 55-60%, mild-mod TR. Pt was started on Metoprolol (currently on 75mg every 6 hours) with difficult to control rates for which EP was consulted for.       Recommendations:    #Atrial fibrillation  - Continue to monitor on telemetry   - [id number] = 4; age, sex, HTN. Recommend restarting therapeutic AC once cleared by Neurology team. Would benefit from LAAO evaluation, which can be done in the outpatient setting. Would obtain CT HARLAN while in house to expedite if patient is agreeable.   - Heart rate poorly controlled on Metoprolol 75mg every 6 hours. Recommend starting Cardizem at 30mg every 6hours.   - TSH normal  - TTE with preserved EF    INCOMPLETE NOTE; PATIENT NOT SEEN    84yo female with HTN, HLD, Breast CA s/p surgery 2020 (no chemo/radiation) and AF recently prescribed Eliquis who was BIBA to [facility] from home due to headache found to be HTN with SBP>200 with an intraventricular bleed. He was transferred to [facility] ED for further evaluation. She reports hitting her head a couple of days before initiation of Eliquis. Hospital course complicated by AF w/ RVR, hyponatremia, and UTI w/leukocytosis. TTE with preserved EF 55-60%, mild-mod TR. Pt was started on Metoprolol (currently on 75mg every 6 hours) with difficult to control rates for which EP was consulted for.       Recommendations:    #Atrial fibrillation difficult to rate controlled  - Continue to monitor on telemetry   - [id number] = 4; age, sex, HTN. Recommend restarting therapeutic AC once cleared by Neurology team. Would benefit from LAAO evaluation, which can be done in the outpatient setting. Would obtain CT HARLAN while in house to expedite if patient is agreeable.   - Heart rate poorly controlled on Metoprolol 75mg every 6 hours. Recommend starting Cardizem at 30mg every 6hours.   - TSH normal  - TTE with preserved EF    INCOMPLETE NOTE; PATIENT NOT SEEN    82yo female with HTN, HLD, Breast CA s/p surgery 2020 (no chemo/radiation) and AF recently prescribed Eliquis who was BIBA to [facility] from home due to headache found to be HTN with SBP>200 with an intraventricular bleed. He was transferred to [facility] ED for further evaluation. She reports hitting her head a couple of days before initiation of Eliquis. Hospital course complicated by AF w/ RVR, hyponatremia, and UTI w/leukocytosis. TTE with preserved EF 55-60%, mild-mod TR. Pt was started on Metoprolol (currently on 75mg every 6 hours) with difficult to control rates for which EP was consulted for.       Recommendations:    #Atrial fibrillation difficult to rate controlled  - Continue to monitor on telemetry   - [id number] = 4; age, sex, HTN. Recommend restarting therapeutic AC once cleared by Neurology team. Would benefit from LAAO evaluation, which can be done in the outpatient setting pending patients clinical course.   - Heart rate poorly controlled on Metoprolol 75mg every 6 hours. Recommend starting Cardizem at 30mg every 6hours.   - TSH normal  - TTE with preserved EF  - Full recommendations to follow      84yo female with HTN, HLD, Breast CA s/p surgery 2020 (no chemo/radiation) and AF recently prescribed Eliquis who was BIBA to [facility] from home due to headache found to be HTN with SBP>200 with an intraventricular bleed. He was transferred to [facility] ED for further evaluation. She reports hitting her head a couple of days before initiation of Eliquis. Hospital course complicated by AF w/ RVR, hyponatremia, and UTI w/leukocytosis. TTE with preserved EF 55-60%, mild-mod TR. Pt was started on Metoprolol (currently on 75mg every 6 hours) with difficult to control rates for which EP was consulted for.       Recommendations:    #Atrial fibrillation difficult to rate controlled  - Continue to monitor on telemetry   - [id number] = 4; age, sex, HTN. Recommend restarting therapeutic AC once cleared by Neurology team. Would benefit from LAAO evaluation, which can be done in the outpatient setting pending patients clinical course.   - Heart rate poorly controlled on Metoprolol 75mg every 6 hours. Recommend starting Cardizem at 30mg every 6hours.   - TSH normal  - TTE with preserved EF  - Full recommendations to follow

## 2024-05-08 NOTE — CONSULT NOTE ADULT - SUBJECTIVE AND OBJECTIVE BOX
[place] [facility]ARDI[facility]LECTROPHYSIOLOGY  [facility] Faculty Practice   Office: [address] [place] [address]  Telephone: [phone] Fax:[phone]      HPI:  84yo female with HTN, HLD, Breast CA s/p surgery 2020 (no chemo/radiation) and AF recently prescribed Eliquis who was BIBA to [facility] from home due to headache found to be HTN with SBP>200 with an intraventricular bleed. He was transferred to [facility] ED for further evaluation. She reports hitting her head a couple of days before initiation of Eliquis. Hospital course complicated by AF w/ RVR, hyponatremia, and UTI w/leukocytosis. TTE with preserved EF 55-60%, mild-mod TR. Pt was started on Metoprolol (currently on 75mg every 6 hours) with difficult to control rates for which EP was consulted for.     TELE:  EKG:    PAST MEDICAL & SURGICAL HISTORY:  HTN (hypertension)  HLD (hyperlipidemia)  Breast Cancer  AF      REVIEW OF SYSTEMS:    CONSTITUTIONAL: No fever, weight loss, or fatigue  EYES: No visual disturbances  NECK: No pain or stiffness  RESPIRATORY: No cough, wheezing, chills or hemoptysis; No shortness of breath  CARDIOVASCULAR: see HPI  GASTROINTESTINAL: No abdominal or epigastric pain. No nausea, vomiting, or hematemesis; No diarrhea or constipation. No melena or hematochezia.  NEUROLOGICAL: No headaches, memory loss, loss of strength, numbness, or tremors  SKIN: No itching, burning, rashes, or lesions   LYMPH NODES: No enlarged glands  ENDOCRINE: No heat or cold intolerance; No hair loss  PSYCHIATRIC: No depression, anxiety, mood swings, or difficulty sleeping  HEME/LYMPH: No easy bruising, or bleeding gums      MEDICATIONS  (STANDING):  anastrozole 1 milliGRAM(s) Oral daily  cefTRIAXone Injectable. 1000 milliGRAM(s) IV Push every 24 hours  heparin   Injectable 5000 Unit(s) SubCutaneous every 8 hours  hydrALAZINE 25 milliGRAM(s) Oral three times a day  influenza  Vaccine (HIGH DOSE) 0.7 milliLiter(s) IntraMuscular once  latanoprost 0.005% Ophthalmic Solution 1 Drop(s) Both EYES at bedtime  levothyroxine 75 MICROGram(s) Oral daily  metoprolol tartrate 75 milliGRAM(s) Oral every 6 hours  pantoprazole    Tablet 40 milliGRAM(s) Oral before breakfast  potassium chloride    Tablet ER 20 milliEquivalent(s) Oral daily  sodium chloride 2 Gram(s) Oral three times a day  triamterene 37.5 mG/hydrochlorothiazide 25 mG Tablet 1 Tablet(s) Oral daily  urea Oral Powder 15 Gram(s) Oral two times a day  valsartan 160 milliGRAM(s) Oral every 12 hours    MEDICATIONS  (PRN):  acetaminophen     Tablet .. 650 milliGRAM(s) Oral every 6 hours PRN Mild Pain (1 - 3)  hydrALAZINE Injectable 10 milliGRAM(s) IV Push every 2 hours PRN SBP> 150 and DBP> 90  labetalol Injectable 10 milliGRAM(s) IV Push every 2 hours PRN SBP> 150 and DBP> 90  senna 2 Tablet(s) Oral at bedtime PRN Constipation      Allergies  No Known Allergies      SOCIAL HISTORY:    FAMILY HISTORY:      Vital Signs Last 24 Hrs  T(C): 36.6 (08 May 2024 16:20), Max: 37.6 (08 May 2024 07:46)  T(F): 97.9 (08 May 2024 16:20), Max: 99.7 (08 May 2024 07:46)  HR: 113 (08 May 2024 16:00) (95 - 129)  BP: 141/89 (08 May 2024 16:00) (106/75 - 166/121)  BP(mean): 102 (08 May 2024 16:00) (87 - 134)  RR: 20 (08 May 2024 16:00) (15 - 20)  SpO2: 96% (08 May 2024 16:00) (95% - 98%)    Parameters below as of 08 May 2024 16:00  Patient On (Oxygen Delivery Method): room air        Physical Exam:  Constitutional: AAOx3, NAD  Neck: supple, No JVD  Cardiovascular: +S1S2 RRR, no murmurs, rubs, gallops   Pulmonary: CTA b/l, unlabored, no wheezes, rales. rhonci  Abdomen: +BS, soft NTND  Extremities: no edema b/l, +distal pulses b/l  Neuro: non focal, speech clear, CRANDALL x 4    LABS:                        11.6   13.18 )-----------( 305      ( 08 May 2024 05:49 )             33.6   05-08    131<L>  |  98  |  35.9<H>  ----------------------------<  124<H>  3.8   |  23.0  |  0.45<L>    Ca    8.6      08 May 2024 05:49  Phos  2.9     05-08    TPro  5.5<L>  /  Alb  3.4  /  TBili  0.8  /  DBili  x   /  AST  21  /  ALT  19  /  AlkPhos  60  05-08  LIVER FUNCTIONS - ( 08 May 2024 05:49 )  Alb: 3.4 g/dL / Pro: 5.5 g/dL / ALK PHOS: 60 U/L / ALT: 19 U/L / AST: 21 U/L / GGT: x               Urinalysis Basic - ( 08 May 2024 05:49 )    Color: x / Appearance: x / SG: x / pH: x  Gluc: 124 mg/dL / Ketone: x  / Bili: x / Urobili: x   Blood: x / Protein: x / Nitrite: x   Leuk Esterase: x / RBC: x / WBC x   Sq Epi: x / Non Sq Epi: x / Bacteria: x        RADIOLOGY & ADDITIONAL STUDIES:  TTE 4/30/2023  CONCLUSIONS:     1. Limited study f/u for LV function.   2. Left ventricular cavity is normal in size. Left ventricular systolic function is normal with an ejection fraction visually estimated at 55 to 60 %.   3. Normal right ventricular cavity size and normal systolic function.   4. Mild to moderate tricuspid regurgitation.   5. No pericardial effusion seen.   6. Estimated pulmonary artery systolic pressure is 60 mmHg, consistent with severe pulmonary hypertension.      ACC: [id number] EXAM:  CT BRAIN     PROCEDURE DATE:  04/28/2024    The study is correlated with a prior exam from 4/28/2024 at 8:00 PM.    FINDINGS:    There is again noted intraventricular hemorrhage with blood casting the   left lateral ventricle, third ventricle, and fourth ventricle and small   volume of hemorrhage in the anterior body of the left lateral ventricle.   There is layering hemorrhage in the occipital horns. There is dilatation   of the lateral and third ventricles which is unchanged compared with the   prior exam. There is ill-defined hemorrhage in the left caudate body and   left caudate head. There is no shift of the midlineor central   herniation. There is generalized cerebral and cerebellar volume loss.   There are chronic ischemic changes in the frontoparietal white matter.   There are atherosclerotic calcifications of the intracranial carotid   arteries.    There isagain noted scattered calcified sebaceous cysts. There is   evidence of bilateral lens surgery. The visualized paranasal sinuses and   tympanic/mastoid cavities are clear apart from minimal ethmoid mucosal   thickening.    IMPRESSION:    No change inintraventricular hemorrhage and dilatation of the lateral   and third ventricles compared with prior exam. Ill-defined hemorrhage   again seen in the left caudate body and left caudate head.    --- End of Report ---      ACC: [id number] EXAM:  CT ANGIO BRAIN STROKE PROT IC       ACC: [id number] EXAM:  CT ANGIO NECK STROKE PROT IC       PROCEDURE DATE:  04/28/2024     FINDINGS:    There is a three-vessel aortic arch. There are atherosclerotic   calcifications at the aortic arch. The common carotid arteries are patent   from the origins to the bifurcations. There is no significant   atherosclerotic disease at the carotid bifurcations. The cervical   internal carotid arteries are patent from the origins to the skullbase.   The vertebral arteries are codominant. There is no evidence of cervical   carotid or vertebral artery dissection.    The skull base and intracranial carotid arteries are patent without   significant stenosis. The proximal MCAs and ACAs are patent without   significant stenosis. The anterior communicating artery is visualized.   Distal AGUSTIN and MCA branches are grossly symmetric.    The skull base and intradural vertebral arteries and basilar artery are   patent without significant stenosis. The proximal PCAs are patent without   significant stenosis. The posterior communicating arteries are not   visualized. The superior cerebellar artery origins, a right AICA/PICA, a   left AICA, and a left PICA are identified.    There is no evidenceof an intracranial arterial aneurysm or   arteriovenous malformation on CTA. No evidence of active extravasation of   contrast into the left caudate or intraventricular hemorrhage.    Intracranial superficial and deep venous sinus system are grossly   unremarkable; few filling defects in the superior sagittal sinus most   likely represent arachnoid granulations.    The regional soft tissues of the neck are otherwise unremarkable. The   lung apices demonstrate mild biapical parenchymal scarring. There are   multilevel degenerative changes of the spine.    IMPRESSION:    CTA NECK:  No significant stenosis of the cervical carotid arteries based   on NASCET criteria. Patent cervical vertebral arteries.    CTA HEAD: No high-grade stenosis or occlusion of the major proximal   arterial branches. No evidence of an intracranial arterial aneurysm or   arteriovenous malformation on CTA. No evidence of active bleeding within   the left caudate and intraventricular hemorrhage.    --- End of Report ---     [facility] [place] [facility]ARDIAC ELECTROPHYSIOLOGY  [facility]/Health system Faculty Practice   Office: [address] [place] [address]  Telephone: [phone] Fax:[phone]      HPI:  82yo female with HTN, HLD, Breast CA s/p surgery 2020 (no chemo/radiation) and AF recently prescribed Eliquis who was BIBA to [facility] from home due to headache found to be HTN with SBP>200 with an intraventricular bleed. He was transferred to [facility] ED for further evaluation. She reports hitting her head a couple of days before initiation of Eliquis. Hospital course complicated by AF w/ RVR, hyponatremia, and UTI w/leukocytosis. TTE with preserved EF 55-60%, mild-mod TR. Pt was started on Metoprolol (currently on 75mg every 6 hours) with difficult to control rates for which EP was consulted for.     TELE:  EKG:    PAST MEDICAL & SURGICAL HISTORY:  HTN (hypertension)  HLD (hyperlipidemia)  Breast Cancer  AF      REVIEW OF SYSTEMS:    CONSTITUTIONAL: No fever, weight loss, or fatigue  EYES: No visual disturbances  NECK: No pain or stiffness  RESPIRATORY: No cough, wheezing, chills or hemoptysis; No shortness of breath  CARDIOVASCULAR: see HPI  GASTROINTESTINAL: No abdominal or epigastric pain. No nausea, vomiting, or hematemesis; No diarrhea or constipation. No melena or hematochezia.  NEUROLOGICAL: No headaches, memory loss, loss of strength, numbness, or tremors  SKIN: No itching, burning, rashes, or lesions   LYMPH NODES: No enlarged glands  ENDOCRINE: No heat or cold intolerance; No hair loss  PSYCHIATRIC: No depression, anxiety, mood swings, or difficulty sleeping  HEME/LYMPH: No easy bruising, or bleeding gums      MEDICATIONS  (STANDING):  anastrozole 1 milliGRAM(s) Oral daily  cefTRIAXone Injectable. 1000 milliGRAM(s) IV Push every 24 hours  heparin   Injectable 5000 Unit(s) SubCutaneous every 8 hours  hydrALAZINE 25 milliGRAM(s) Oral three times a day  influenza  Vaccine (HIGH DOSE) 0.7 milliLiter(s) IntraMuscular once  latanoprost 0.005% Ophthalmic Solution 1 Drop(s) Both EYES at bedtime  levothyroxine 75 MICROGram(s) Oral daily  metoprolol tartrate 75 milliGRAM(s) Oral every 6 hours  pantoprazole    Tablet 40 milliGRAM(s) Oral before breakfast  potassium chloride    Tablet ER 20 milliEquivalent(s) Oral daily  sodium chloride 2 Gram(s) Oral three times a day  triamterene 37.5 mG/hydrochlorothiazide 25 mG Tablet 1 Tablet(s) Oral daily  urea Oral Powder 15 Gram(s) Oral two times a day  valsartan 160 milliGRAM(s) Oral every 12 hours    MEDICATIONS  (PRN):  acetaminophen     Tablet .. 650 milliGRAM(s) Oral every 6 hours PRN Mild Pain (1 - 3)  hydrALAZINE Injectable 10 milliGRAM(s) IV Push every 2 hours PRN SBP> 150 and DBP> 90  labetalol Injectable 10 milliGRAM(s) IV Push every 2 hours PRN SBP> 150 and DBP> 90  senna 2 Tablet(s) Oral at bedtime PRN Constipation      Allergies  No Known Allergies      SOCIAL HISTORY:    FAMILY HISTORY:      Vital Signs Last 24 Hrs  T(C): 36.6 (08 May 2024 16:20), Max: 37.6 (08 May 2024 07:46)  T(F): 97.9 (08 May 2024 16:20), Max: 99.7 (08 May 2024 07:46)  HR: 113 (08 May 2024 16:00) (95 - 129)  BP: 141/89 (08 May 2024 16:00) (106/75 - 166/121)  BP(mean): 102 (08 May 2024 16:00) (87 - 134)  RR: 20 (08 May 2024 16:00) (15 - 20)  SpO2: 96% (08 May 2024 16:00) (95% - 98%)    Parameters below as of 08 May 2024 16:00  Patient On (Oxygen Delivery Method): room air        Physical Exam:  Constitutional: AAOx3, NAD  Neck: supple, No JVD  Cardiovascular: +S1S2 RRR, no murmurs, rubs, gallops   Pulmonary: CTA b/l, unlabored, no wheezes, rales. rhonci  Abdomen: +BS, soft NTND  Extremities: no edema b/l, +distal pulses b/l  Neuro: non focal, speech clear, CRANDALL x 4    LABS:                        11.6   13.18 )-----------( 305      ( 08 May 2024 05:49 )             33.6   05-08    131<L>  |  98  |  35.9<H>  ----------------------------<  124<H>  3.8   |  23.0  |  0.45<L>    Ca    8.6      08 May 2024 05:49  Phos  2.9     05-08    TPro  5.5<L>  /  Alb  3.4  /  TBili  0.8  /  DBili  x   /  AST  21  /  ALT  19  /  AlkPhos  60  05-08  LIVER FUNCTIONS - ( 08 May 2024 05:49 )  Alb: 3.4 g/dL / Pro: 5.5 g/dL / ALK PHOS: 60 U/L / ALT: 19 U/L / AST: 21 U/L / GGT: x               Urinalysis Basic - ( 08 May 2024 05:49 )    Color: x / Appearance: x / SG: x / pH: x  Gluc: 124 mg/dL / Ketone: x  / Bili: x / Urobili: x   Blood: x / Protein: x / Nitrite: x   Leuk Esterase: x / RBC: x / WBC x   Sq Epi: x / Non Sq Epi: x / Bacteria: x        RADIOLOGY & ADDITIONAL STUDIES:  TTE 4/30/2023  CONCLUSIONS:     1. Limited study f/u for LV function.   2. Left ventricular cavity is normal in size. Left ventricular systolic function is normal with an ejection fraction visually estimated at 55 to 60 %.   3. Normal right ventricular cavity size and normal systolic function.   4. Mild to moderate tricuspid regurgitation.   5. No pericardial effusion seen.   6. Estimated pulmonary artery systolic pressure is 60 mmHg, consistent with severe pulmonary hypertension.    CT Head No Cont (05.06.24 @ 17:30)   IMPRESSION: Stable acute left basal ganglia/caudate head hemorrhage   extending into the lateral ventricles. Mildly improved intraventricular   hemorrhage in the occipital horns. Stable mild-to-moderate hydrocephalus.   No new hemorrhage.    MR Head w/wo IV Cont (05.05.24 @ 15:54)   IMPRESSION: Stable left basal ganglia hemorrhage with extension to the lateral ventricle. Stable mild-to-moderate hydrocephalus. No abnormal enhancement to suggest underlying lesion.    CT Head No Cont (05.03.24 @ 02:57)   IMPRESSION: Left caudate nucleus intraparenchymal hemorrhage with intraventricular extension, grossly stable overall. Mild hydrocephalus is grossly stable.    CT Head No Cont (05.01.24 @ 08:30)   IMPRESSION: Stable exam.    CT Head No Cont (04.29.24 @ 20:33)   IMPRESSION: Stable acute small left caudate head intraparenchymal hemorrhage measuring 6 mm. Moderate intraventricular hemorrhage   throughout the left lateral ventricle, third ventricle and fourth ventricle. Mild intraventricular hemorrhage in the right lateral ventricle. Mild to moderate hydrocephalus, unchanged..    CT Head No Cont (04.28.24 @ 22:53) >  IMPRESSION: No change in intraventricular hemorrhage and dilatation of the lateral and third ventricles compared with prior exam. Ill-defined hemorrhage again seen in the left caudate body and left caudate head.    CT Angio Brain Stroke Protocol  w/ IV Cont (04.28.24 @ 20:02) >IMPRESSION:  CTA NECK:  No significant stenosis of the cervical carotid arteries based on NASCET criteria. Patent cervical vertebral arteries.  CTA HEAD: No high-grade stenosis or occlusion of the major proximal arterial branches. No evidence of an intracranial arterial aneurysm or   arteriovenous malformation on CTA. No evidence of active bleeding within the left caudate and intraventricular hemorrhage.                 [place] [facility]ARDIAC ELECTROPHYSIOLOGY  [facility] Faculty Practice   Office: [address] [place] [address]  Telephone: [phone] Fax:[phone]      HPI:  84yo female with HTN, HLD, Breast CA s/p surgery  (no chemo/radiation) and AF recently prescribed Eliquis who was BIBA to [facility] from home due to headache found to be HTN with SBP>200 with an intraventricular bleed. He was transferred to [facility] ED for further evaluation. She reports hitting her head a couple of days before initiation of Eliquis. Hospital course complicated by AF w/ RVR, hyponatremia, and UTI w/leukocytosis. TTE with preserved EF 55-60%, mild-mod TR. Pt was started on Metoprolol (currently on 75mg every 6 hours) with difficult to control rates for which EP was consulted for.     TELE:  EK2024; SR @ 58bpm. CT 176ms. QRSD 92ms    PAST MEDICAL & SURGICAL HISTORY:  HTN (hypertension)  HLD (hyperlipidemia)  Breast Cancer  AF      REVIEW OF SYSTEMS:    CONSTITUTIONAL: No fever, weight loss, or fatigue  EYES: No visual disturbances  NECK: No pain or stiffness  RESPIRATORY: No cough, wheezing, chills or hemoptysis; No shortness of breath  CARDIOVASCULAR: see HPI  GASTROINTESTINAL: No abdominal or epigastric pain. No nausea, vomiting, or hematemesis; No diarrhea or constipation. No melena or hematochezia.  NEUROLOGICAL: No headaches, memory loss, loss of strength, numbness, or tremors  SKIN: No itching, burning, rashes, or lesions   LYMPH NODES: No enlarged glands  ENDOCRINE: No heat or cold intolerance; No hair loss  PSYCHIATRIC: No depression, anxiety, mood swings, or difficulty sleeping  HEME/LYMPH: No easy bruising, or bleeding gums      MEDICATIONS  (STANDING):  anastrozole 1 milliGRAM(s) Oral daily  cefTRIAXone Injectable. 1000 milliGRAM(s) IV Push every 24 hours  heparin   Injectable 5000 Unit(s) SubCutaneous every 8 hours  hydrALAZINE 25 milliGRAM(s) Oral three times a day  influenza  Vaccine (HIGH DOSE) 0.7 milliLiter(s) IntraMuscular once  latanoprost 0.005% Ophthalmic Solution 1 Drop(s) Both EYES at bedtime  levothyroxine 75 MICROGram(s) Oral daily  metoprolol tartrate 75 milliGRAM(s) Oral every 6 hours  pantoprazole    Tablet 40 milliGRAM(s) Oral before breakfast  potassium chloride    Tablet ER 20 milliEquivalent(s) Oral daily  sodium chloride 2 Gram(s) Oral three times a day  triamterene 37.5 mG/hydrochlorothiazide 25 mG Tablet 1 Tablet(s) Oral daily  urea Oral Powder 15 Gram(s) Oral two times a day  valsartan 160 milliGRAM(s) Oral every 12 hours    MEDICATIONS  (PRN):  acetaminophen     Tablet .. 650 milliGRAM(s) Oral every 6 hours PRN Mild Pain (1 - 3)  hydrALAZINE Injectable 10 milliGRAM(s) IV Push every 2 hours PRN SBP> 150 and DBP> 90  labetalol Injectable 10 milliGRAM(s) IV Push every 2 hours PRN SBP> 150 and DBP> 90  senna 2 Tablet(s) Oral at bedtime PRN Constipation      Allergies  No Known Allergies      SOCIAL HISTORY:    FAMILY HISTORY:      Vital Signs Last 24 Hrs  T(C): 36.6 (08 May 2024 16:20), Max: 37.6 (08 May 2024 07:46)  T(F): 97.9 (08 May 2024 16:20), Max: 99.7 (08 May 2024 07:46)  HR: 113 (08 May 2024 16:00) (95 - 129)  BP: 141/89 (08 May 2024 16:00) (106/75 - 166/121)  BP(mean): 102 (08 May 2024 16:00) (87 - 134)  RR: 20 (08 May 2024 16:00) (15 - 20)  SpO2: 96% (08 May 2024 16:00) (95% - 98%)    Parameters below as of 08 May 2024 16:00  Patient On (Oxygen Delivery Method): room air        Physical Exam:  Constitutional: AAOx3, NAD  Neck: supple, No JVD  Cardiovascular: +S1S2 RRR, no murmurs, rubs, gallops   Pulmonary: CTA b/l, unlabored, no wheezes, rales. rhonci  Abdomen: +BS, soft NTND  Extremities: no edema b/l, +distal pulses b/l  Neuro: non focal, speech clear, CRANDALL x 4    LABS:                        11.6   13.18 )-----------( 305      ( 08 May 2024 05:49 )             33.6   05-08    131<L>  |  98  |  35.9<H>  ----------------------------<  124<H>  3.8   |  23.0  |  0.45<L>    Ca    8.6      08 May 2024 05:49  Phos  2.9     05-08    TPro  5.5<L>  /  Alb  3.4  /  TBili  0.8  /  DBili  x   /  AST  21  /  ALT  19  /  AlkPhos  60  05-08  LIVER FUNCTIONS - ( 08 May 2024 05:49 )  Alb: 3.4 g/dL / Pro: 5.5 g/dL / ALK PHOS: 60 U/L / ALT: 19 U/L / AST: 21 U/L / GGT: x               Urinalysis Basic - ( 08 May 2024 05:49 )    Color: x / Appearance: x / SG: x / pH: x  Gluc: 124 mg/dL / Ketone: x  / Bili: x / Urobili: x   Blood: x / Protein: x / Nitrite: x   Leuk Esterase: x / RBC: x / WBC x   Sq Epi: x / Non Sq Epi: x / Bacteria: x        RADIOLOGY & ADDITIONAL STUDIES:  TTE 2023  CONCLUSIONS:     1. Limited study f/u for LV function.   2. Left ventricular cavity is normal in size. Left ventricular systolic function is normal with an ejection fraction visually estimated at 55 to 60 %.   3. Normal right ventricular cavity size and normal systolic function.   4. Mild to moderate tricuspid regurgitation.   5. No pericardial effusion seen.   6. Estimated pulmonary artery systolic pressure is 60 mmHg, consistent with severe pulmonary hypertension.    CT Head No Cont (24 @ 17:30)   IMPRESSION: Stable acute left basal ganglia/caudate head hemorrhage   extending into the lateral ventricles. Mildly improved intraventricular   hemorrhage in the occipital horns. Stable mild-to-moderate hydrocephalus.   No new hemorrhage.    MR Head w/wo IV Cont (24 @ 15:54)   IMPRESSION: Stable left basal ganglia hemorrhage with extension to the lateral ventricle. Stable mild-to-moderate hydrocephalus. No abnormal enhancement to suggest underlying lesion.    CT Head No Cont (24 @ 02:57)   IMPRESSION: Left caudate nucleus intraparenchymal hemorrhage with intraventricular extension, grossly stable overall. Mild hydrocephalus is grossly stable.    CT Head No Cont (24 @ 08:30)   IMPRESSION: Stable exam.    CT Head No Cont (24 @ 20:33)   IMPRESSION: Stable acute small left caudate head intraparenchymal hemorrhage measuring 6 mm. Moderate intraventricular hemorrhage   throughout the left lateral ventricle, third ventricle and fourth ventricle. Mild intraventricular hemorrhage in the right lateral ventricle. Mild to moderate hydrocephalus, unchanged..    CT Head No Cont (24 @ 22:53) >  IMPRESSION: No change in intraventricular hemorrhage and dilatation of the lateral and third ventricles compared with prior exam. Ill-defined hemorrhage again seen in the left caudate body and left caudate head.    CT Angio Brain Stroke Protocol  w/ IV Cont (24 @ 20:02) >IMPRESSION:  CTA NECK:  No significant stenosis of the cervical carotid arteries based on NASCET criteria. Patent cervical vertebral arteries.  CTA HEAD: No high-grade stenosis or occlusion of the major proximal arterial branches. No evidence of an intracranial arterial aneurysm or   arteriovenous malformation on CTA. No evidence of active bleeding within the left caudate and intraventricular hemorrhage.                 [facility] CARDIAC ELECTROPHYSIOLOGY  [facility] Faculty Practice   Office: [address] [place] [address]  Telephone: [phone] Fax:[phone]      HPI:  82yo female with HTN, HLD, Breast CA s/p surgery  (no chemo/radiation) and AF recently prescribed Eliquis who was BIBA to [facility] from home due to headache found to be HTN with SBP>200 with an intraventricular bleed. He was transferred to [facility] ED for further evaluation. She reports hitting her head a couple of days before initiation of Eliquis. Hospital course complicated by AF w/ RVR, hyponatremia, and UTI w/leukocytosis. TTE with preserved EF 55-60%, mild-mod TR. Pt was started on Metoprolol (currently on 75mg every 6 hours) with difficult to control rates for which EP was consulted for.     TELE: AF ~110-120 bpm with faster rates up to 180bpm   EK2024; SR @ 58bpm. RI 176ms. QRSD 92ms    PAST MEDICAL & SURGICAL HISTORY:  HTN (hypertension)  HLD (hyperlipidemia)  Breast Cancer  AF      REVIEW OF SYSTEMS:    CONSTITUTIONAL: No fever, weight loss, or fatigue  EYES: No visual disturbances  NECK: No pain or stiffness  RESPIRATORY: No cough, wheezing, chills or hemoptysis; No shortness of breath  CARDIOVASCULAR: see HPI  GASTROINTESTINAL: No abdominal or epigastric pain. No nausea, vomiting, or hematemesis; No diarrhea or constipation. No melena or hematochezia.  NEUROLOGICAL: No headaches, memory loss, loss of strength, numbness, or tremors  SKIN: No itching, burning, rashes, or lesions   LYMPH NODES: No enlarged glands  ENDOCRINE: No heat or cold intolerance; No hair loss  PSYCHIATRIC: No depression, anxiety, mood swings, or difficulty sleeping  HEME/LYMPH: No easy bruising, or bleeding gums      MEDICATIONS  (STANDING):  anastrozole 1 milliGRAM(s) Oral daily  cefTRIAXone Injectable. 1000 milliGRAM(s) IV Push every 24 hours  heparin   Injectable 5000 Unit(s) SubCutaneous every 8 hours  hydrALAZINE 25 milliGRAM(s) Oral three times a day  influenza  Vaccine (HIGH DOSE) 0.7 milliLiter(s) IntraMuscular once  latanoprost 0.005% Ophthalmic Solution 1 Drop(s) Both EYES at bedtime  levothyroxine 75 MICROGram(s) Oral daily  metoprolol tartrate 75 milliGRAM(s) Oral every 6 hours  pantoprazole    Tablet 40 milliGRAM(s) Oral before breakfast  potassium chloride    Tablet ER 20 milliEquivalent(s) Oral daily  sodium chloride 2 Gram(s) Oral three times a day  triamterene 37.5 mG/hydrochlorothiazide 25 mG Tablet 1 Tablet(s) Oral daily  urea Oral Powder 15 Gram(s) Oral two times a day  valsartan 160 milliGRAM(s) Oral every 12 hours    MEDICATIONS  (PRN):  acetaminophen     Tablet .. 650 milliGRAM(s) Oral every 6 hours PRN Mild Pain (1 - 3)  hydrALAZINE Injectable 10 milliGRAM(s) IV Push every 2 hours PRN SBP> 150 and DBP> 90  labetalol Injectable 10 milliGRAM(s) IV Push every 2 hours PRN SBP> 150 and DBP> 90  senna 2 Tablet(s) Oral at bedtime PRN Constipation      Allergies  No Known Allergies      SOCIAL HISTORY:    FAMILY HISTORY:      Vital Signs Last 24 Hrs  T(C): 36.6 (08 May 2024 16:20), Max: 37.6 (08 May 2024 07:46)  T(F): 97.9 (08 May 2024 16:20), Max: 99.7 (08 May 2024 07:46)  HR: 113 (08 May 2024 16:00) (95 - 129)  BP: 141/89 (08 May 2024 16:00) (106/75 - 166/121)  BP(mean): 102 (08 May 2024 16:00) (87 - 134)  RR: 20 (08 May 2024 16:00) (15 - 20)  SpO2: 96% (08 May 2024 16:00) (95% - 98%)    Parameters below as of 08 May 2024 16:00  Patient On (Oxygen Delivery Method): room air        Physical Exam:  Constitutional: AAOx3, NAD  Neck: supple, No JVD  Cardiovascular: +S1S2 RRR, no murmurs, rubs, gallops   Pulmonary: CTA b/l, unlabored, no wheezes, rales. rhonci  Abdomen: +BS, soft NTND  Extremities: no edema b/l, +distal pulses b/l  Neuro: non focal, speech clear, CRANDALL x 4    LABS:                        11.6   13.18 )-----------( 305      ( 08 May 2024 05:49 )             33.6   05-08    131<L>  |  98  |  35.9<H>  ----------------------------<  124<H>  3.8   |  23.0  |  0.45<L>    Ca    8.6      08 May 2024 05:49  Phos  2.9     05-08    TPro  5.5<L>  /  Alb  3.4  /  TBili  0.8  /  DBili  x   /  AST  21  /  ALT  19  /  AlkPhos  60  05-08  LIVER FUNCTIONS - ( 08 May 2024 05:49 )  Alb: 3.4 g/dL / Pro: 5.5 g/dL / ALK PHOS: 60 U/L / ALT: 19 U/L / AST: 21 U/L / GGT: x               Urinalysis Basic - ( 08 May 2024 05:49 )    Color: x / Appearance: x / SG: x / pH: x  Gluc: 124 mg/dL / Ketone: x  / Bili: x / Urobili: x   Blood: x / Protein: x / Nitrite: x   Leuk Esterase: x / RBC: x / WBC x   Sq Epi: x / Non Sq Epi: x / Bacteria: x        RADIOLOGY & ADDITIONAL STUDIES:  TTE 2023  CONCLUSIONS:     1. Limited study f/u for LV function.   2. Left ventricular cavity is normal in size. Left ventricular systolic function is normal with an ejection fraction visually estimated at 55 to 60 %.   3. Normal right ventricular cavity size and normal systolic function.   4. Mild to moderate tricuspid regurgitation.   5. No pericardial effusion seen.   6. Estimated pulmonary artery systolic pressure is 60 mmHg, consistent with severe pulmonary hypertension.    CT Head No Cont (24 @ 17:30)   IMPRESSION: Stable acute left basal ganglia/caudate head hemorrhage   extending into the lateral ventricles. Mildly improved intraventricular   hemorrhage in the occipital horns. Stable mild-to-moderate hydrocephalus.   No new hemorrhage.    MR Head w/wo IV Cont (24 @ 15:54)   IMPRESSION: Stable left basal ganglia hemorrhage with extension to the lateral ventricle. Stable mild-to-moderate hydrocephalus. No abnormal enhancement to suggest underlying lesion.    CT Head No Cont (24 @ 02:57)   IMPRESSION: Left caudate nucleus intraparenchymal hemorrhage with intraventricular extension, grossly stable overall. Mild hydrocephalus is grossly stable.    CT Head No Cont (24 @ 08:30)   IMPRESSION: Stable exam.    CT Head No Cont (24 @ 20:33)   IMPRESSION: Stable acute small left caudate head intraparenchymal hemorrhage measuring 6 mm. Moderate intraventricular hemorrhage   throughout the left lateral ventricle, third ventricle and fourth ventricle. Mild intraventricular hemorrhage in the right lateral ventricle. Mild to moderate hydrocephalus, unchanged..    CT Head No Cont (24 @ 22:53) >  IMPRESSION: No change in intraventricular hemorrhage and dilatation of the lateral and third ventricles compared with prior exam. Ill-defined hemorrhage again seen in the left caudate body and left caudate head.    CT Angio Brain Stroke Protocol  w/ IV Cont (24 @ 20:02) >IMPRESSION:  CTA NECK:  No significant stenosis of the cervical carotid arteries based on NASCET criteria. Patent cervical vertebral arteries.  CTA HEAD: No high-grade stenosis or occlusion of the major proximal arterial branches. No evidence of an intracranial arterial aneurysm or   arteriovenous malformation on CTA. No evidence of active bleeding within the left caudate and intraventricular hemorrhage.                 [place] [facility]  [facility]   Office: [address] [address]  Telephone: [phone] Fax:[phone]      HPI:  84yo female with HTN, HLD, Breast CA s/p surgery  (no chemo/radiation) and AF recently prescribed Eliquis who was BIBA to [facility] from home due to headache found to be HTN with SBP>200 with an intraventricular bleed. He was transferred to [facility] ED for further evaluation. She reports hitting her head a couple of days before initiation of Eliquis. Hospital course complicated by AF w/ RVR, hyponatremia, and UTI w/leukocytosis. TTE with preserved EF 55-60%, mild-mod TR. Pt was started on Metoprolol (currently on 75mg every 6 hours) with difficult to control rates for which EP was consulted for.     Patient seen, difficult to obtain information. Pt is alert and oriented to person and place, but somnolent requiring constant verbal stimuli to wake.     TELE: AF ~110-120 bpm with faster rates up to 180bpm   EK2024; SR @ 58bpm. AK 176ms. QRSD 92ms    PAST MEDICAL & SURGICAL HISTORY:  HTN (hypertension)  HLD (hyperlipidemia)  Breast Cancer  AF      REVIEW OF SYSTEMS:  Unable to obtain      MEDICATIONS  (STANDING):  anastrozole 1 milliGRAM(s) Oral daily  cefTRIAXone Injectable. 1000 milliGRAM(s) IV Push every 24 hours  heparin   Injectable 5000 Unit(s) SubCutaneous every 8 hours  hydrALAZINE 25 milliGRAM(s) Oral three times a day  influenza  Vaccine (HIGH DOSE) 0.7 milliLiter(s) IntraMuscular once  latanoprost 0.005% Ophthalmic Solution 1 Drop(s) Both EYES at bedtime  levothyroxine 75 MICROGram(s) Oral daily  metoprolol tartrate 75 milliGRAM(s) Oral every 6 hours  pantoprazole    Tablet 40 milliGRAM(s) Oral before breakfast  potassium chloride    Tablet ER 20 milliEquivalent(s) Oral daily  sodium chloride 2 Gram(s) Oral three times a day  triamterene 37.5 mG/hydrochlorothiazide 25 mG Tablet 1 Tablet(s) Oral daily  urea Oral Powder 15 Gram(s) Oral two times a day  valsartan 160 milliGRAM(s) Oral every 12 hours    MEDICATIONS  (PRN):  acetaminophen     Tablet .. 650 milliGRAM(s) Oral every 6 hours PRN Mild Pain (1 - 3)  hydrALAZINE Injectable 10 milliGRAM(s) IV Push every 2 hours PRN SBP> 150 and DBP> 90  labetalol Injectable 10 milliGRAM(s) IV Push every 2 hours PRN SBP> 150 and DBP> 90  senna 2 Tablet(s) Oral at bedtime PRN Constipation      Allergies  No Known Allergies      SOCIAL HISTORY:    FAMILY HISTORY:      Vital Signs Last 24 Hrs  T(C): 36.6 (08 May 2024 16:20), Max: 37.6 (08 May 2024 07:46)  T(F): 97.9 (08 May 2024 16:20), Max: 99.7 (08 May 2024 07:46)  HR: 113 (08 May 2024 16:00) (95 - 129)  BP: 141/89 (08 May 2024 16:00) (106/75 - 166/121)  BP(mean): 102 (08 May 2024 16:00) (87 - 134)  RR: 20 (08 May 2024 16:00) (15 - 20)  SpO2: 96% (08 May 2024 16:00) (95% - 98%)    Parameters below as of 08 May 2024 16:00  Patient On (Oxygen Delivery Method): room air        Physical Exam:  Constitutional: AAO to person and place  Neck: supple, No JVD  Cardiovascular: +S1S2 irregular, tachycardic   Pulmonary: CTA b/l, unlabored, no wheezes, rales. rhonci  Abdomen: +BS, soft NTND  Extremities: no edema b/l    LABS:                        11.6   13.18 )-----------( 305      ( 08 May 2024 05:49 )             33.6   05-08    131<L>  |  98  |  35.9<H>  ----------------------------<  124<H>  3.8   |  23.0  |  0.45<L>    Ca    8.6      08 May 2024 05:49  Phos  2.9     05-08    TPro  5.5<L>  /  Alb  3.4  /  TBili  0.8  /  DBili  x   /  AST  21  /  ALT  19  /  AlkPhos  60  05-08  LIVER FUNCTIONS - ( 08 May 2024 05:49 )  Alb: 3.4 g/dL / Pro: 5.5 g/dL / ALK PHOS: 60 U/L / ALT: 19 U/L / AST: 21 U/L / GGT: x               Urinalysis Basic - ( 08 May 2024 05:49 )    Color: x / Appearance: x / SG: x / pH: x  Gluc: 124 mg/dL / Ketone: x  / Bili: x / Urobili: x   Blood: x / Protein: x / Nitrite: x   Leuk Esterase: x / RBC: x / WBC x   Sq Epi: x / Non Sq Epi: x / Bacteria: x        RADIOLOGY & ADDITIONAL STUDIES:  TTE 2023  CONCLUSIONS:     1. Limited study f/u for LV function.   2. Left ventricular cavity is normal in size. Left ventricular systolic function is normal with an ejection fraction visually estimated at 55 to 60 %.   3. Normal right ventricular cavity size and normal systolic function.   4. Mild to moderate tricuspid regurgitation.   5. No pericardial effusion seen.   6. Estimated pulmonary artery systolic pressure is 60 mmHg, consistent with severe pulmonary hypertension.    CT Head No Cont (24 @ 17:30)   IMPRESSION: Stable acute left basal ganglia/caudate head hemorrhage   extending into the lateral ventricles. Mildly improved intraventricular   hemorrhage in the occipital horns. Stable mild-to-moderate hydrocephalus.   No new hemorrhage.    MR Head w/wo IV Cont (24 @ 15:54)   IMPRESSION: Stable left basal ganglia hemorrhage with extension to the lateral ventricle. Stable mild-to-moderate hydrocephalus. No abnormal enhancement to suggest underlying lesion.    CT Head No Cont (24 @ 02:57)   IMPRESSION: Left caudate nucleus intraparenchymal hemorrhage with intraventricular extension, grossly stable overall. Mild hydrocephalus is grossly stable.    CT Head No Cont (24 @ 08:30)   IMPRESSION: Stable exam.    CT Head No Cont (24 @ 20:33)   IMPRESSION: Stable acute small left caudate head intraparenchymal hemorrhage measuring 6 mm. Moderate intraventricular hemorrhage   throughout the left lateral ventricle, third ventricle and fourth ventricle. Mild intraventricular hemorrhage in the right lateral ventricle. Mild to moderate hydrocephalus, unchanged..    CT Head No Cont (24 @ 22:53) >  IMPRESSION: No change in intraventricular hemorrhage and dilatation of the lateral and third ventricles compared with prior exam. Ill-defined hemorrhage again seen in the left caudate body and left caudate head.    CT Angio Brain Stroke Protocol  w/ IV Cont (24 @ 20:02) >IMPRESSION:  CTA NECK:  No significant stenosis of the cervical carotid arteries based on NASCET criteria. Patent cervical vertebral arteries.  CTA HEAD: No high-grade stenosis or occlusion of the major proximal arterial branches. No evidence of an intracranial arterial aneurysm or   arteriovenous malformation on CTA. No evidence of active bleeding within the left caudate and intraventricular hemorrhage.                 [facility]  [facility]   Office: [address] [address]  Telephone: [phone] Fax:[phone]      HPI:  82yo female with HTN, HLD, Breast CA s/p surgery  (no chemo/radiation) and AF recently prescribed Eliquis who was BIBA to [facility] from home due to headache found to be HTN with SBP>200 with an intraventricular bleed. He was transferred to [facility] ED for further evaluation. She reports hitting her head a couple of days before initiation of Eliquis. Hospital course complicated by AF w/ RVR, hyponatremia, and UTI w/leukocytosis. TTE with preserved EF 55-60%, mild-mod TR. Pt was started on Metoprolol (currently on 75mg every 6 hours) with difficult to control rates for which EP was consulted for.     Patient seen, difficult to obtain information. Pt is alert and oriented to person and place, but somnolent requiring constant verbal stimuli to wake (RN reports this is baseline).    TELE: AF ~110-120 bpm with faster rates up to 180bpm   EK2024; SR @ 58bpm. RI 176ms. QRSD 92ms    PAST MEDICAL & SURGICAL HISTORY:  HTN (hypertension)  HLD (hyperlipidemia)  Breast Cancer  AF      REVIEW OF SYSTEMS:  Unable to obtain      MEDICATIONS  (STANDING):  anastrozole 1 milliGRAM(s) Oral daily  cefTRIAXone Injectable. 1000 milliGRAM(s) IV Push every 24 hours  heparin   Injectable 5000 Unit(s) SubCutaneous every 8 hours  hydrALAZINE 25 milliGRAM(s) Oral three times a day  influenza  Vaccine (HIGH DOSE) 0.7 milliLiter(s) IntraMuscular once  latanoprost 0.005% Ophthalmic Solution 1 Drop(s) Both EYES at bedtime  levothyroxine 75 MICROGram(s) Oral daily  metoprolol tartrate 75 milliGRAM(s) Oral every 6 hours  pantoprazole    Tablet 40 milliGRAM(s) Oral before breakfast  potassium chloride    Tablet ER 20 milliEquivalent(s) Oral daily  sodium chloride 2 Gram(s) Oral three times a day  triamterene 37.5 mG/hydrochlorothiazide 25 mG Tablet 1 Tablet(s) Oral daily  urea Oral Powder 15 Gram(s) Oral two times a day  valsartan 160 milliGRAM(s) Oral every 12 hours    MEDICATIONS  (PRN):  acetaminophen     Tablet .. 650 milliGRAM(s) Oral every 6 hours PRN Mild Pain (1 - 3)  hydrALAZINE Injectable 10 milliGRAM(s) IV Push every 2 hours PRN SBP> 150 and DBP> 90  labetalol Injectable 10 milliGRAM(s) IV Push every 2 hours PRN SBP> 150 and DBP> 90  senna 2 Tablet(s) Oral at bedtime PRN Constipation      Allergies  No Known Allergies      SOCIAL HISTORY:    FAMILY HISTORY:      Vital Signs Last 24 Hrs  T(C): 36.6 (08 May 2024 16:20), Max: 37.6 (08 May 2024 07:46)  T(F): 97.9 (08 May 2024 16:20), Max: 99.7 (08 May 2024 07:46)  HR: 113 (08 May 2024 16:00) (95 - 129)  BP: 141/89 (08 May 2024 16:00) (106/75 - 166/121)  BP(mean): 102 (08 May 2024 16:00) (87 - 134)  RR: 20 (08 May 2024 16:00) (15 - 20)  SpO2: 96% (08 May 2024 16:00) (95% - 98%)    Parameters below as of 08 May 2024 16:00  Patient On (Oxygen Delivery Method): room air        Physical Exam:  Constitutional: AAO to person and place  Neck: supple, No JVD  Cardiovascular: +S1S2 irregular, tachycardic   Pulmonary: CTA b/l, unlabored, no wheezes, rales. rhonci  Abdomen: +BS, soft NTND  Extremities: no edema b/l    LABS:                        11.6   13.18 )-----------( 305      ( 08 May 2024 05:49 )             33.6   05-08    131<L>  |  98  |  35.9<H>  ----------------------------<  124<H>  3.8   |  23.0  |  0.45<L>    Ca    8.6      08 May 2024 05:49  Phos  2.9     05-08    TPro  5.5<L>  /  Alb  3.4  /  TBili  0.8  /  DBili  x   /  AST  21  /  ALT  19  /  AlkPhos  60  05-08  LIVER FUNCTIONS - ( 08 May 2024 05:49 )  Alb: 3.4 g/dL / Pro: 5.5 g/dL / ALK PHOS: 60 U/L / ALT: 19 U/L / AST: 21 U/L / GGT: x               Urinalysis Basic - ( 08 May 2024 05:49 )    Color: x / Appearance: x / SG: x / pH: x  Gluc: 124 mg/dL / Ketone: x  / Bili: x / Urobili: x   Blood: x / Protein: x / Nitrite: x   Leuk Esterase: x / RBC: x / WBC x   Sq Epi: x / Non Sq Epi: x / Bacteria: x        RADIOLOGY & ADDITIONAL STUDIES:  TTE 2023  CONCLUSIONS:     1. Limited study f/u for LV function.   2. Left ventricular cavity is normal in size. Left ventricular systolic function is normal with an ejection fraction visually estimated at 55 to 60 %.   3. Normal right ventricular cavity size and normal systolic function.   4. Mild to moderate tricuspid regurgitation.   5. No pericardial effusion seen.   6. Estimated pulmonary artery systolic pressure is 60 mmHg, consistent with severe pulmonary hypertension.    CT Head No Cont (24 @ 17:30)   IMPRESSION: Stable acute left basal ganglia/caudate head hemorrhage   extending into the lateral ventricles. Mildly improved intraventricular   hemorrhage in the occipital horns. Stable mild-to-moderate hydrocephalus.   No new hemorrhage.    MR Head w/wo IV Cont (24 @ 15:54)   IMPRESSION: Stable left basal ganglia hemorrhage with extension to the lateral ventricle. Stable mild-to-moderate hydrocephalus. No abnormal enhancement to suggest underlying lesion.    CT Head No Cont (24 @ 02:57)   IMPRESSION: Left caudate nucleus intraparenchymal hemorrhage with intraventricular extension, grossly stable overall. Mild hydrocephalus is grossly stable.    CT Head No Cont (24 @ 08:30)   IMPRESSION: Stable exam.    CT Head No Cont (24 @ 20:33)   IMPRESSION: Stable acute small left caudate head intraparenchymal hemorrhage measuring 6 mm. Moderate intraventricular hemorrhage   throughout the left lateral ventricle, third ventricle and fourth ventricle. Mild intraventricular hemorrhage in the right lateral ventricle. Mild to moderate hydrocephalus, unchanged..    CT Head No Cont (24 @ 22:53) >  IMPRESSION: No change in intraventricular hemorrhage and dilatation of the lateral and third ventricles compared with prior exam. Ill-defined hemorrhage again seen in the left caudate body and left caudate head.    CT Angio Brain Stroke Protocol  w/ IV Cont (24 @ 20:02) >IMPRESSION:  CTA NECK:  No significant stenosis of the cervical carotid arteries based on NASCET criteria. Patent cervical vertebral arteries.  CTA HEAD: No high-grade stenosis or occlusion of the major proximal arterial branches. No evidence of an intracranial arterial aneurysm or   arteriovenous malformation on CTA. No evidence of active bleeding within the left caudate and intraventricular hemorrhage.

## 2024-05-08 NOTE — PROGRESS NOTE ADULT - ASSESSMENT
Euvolemic hyponatremia: urine studies suggestive of SIADH  Serum Na+ improved  - PO fluid restriction  - cont NaCl tabs; responded well to Tolvaptan  - add PO Urea  - trend labs

## 2024-05-08 NOTE — PROGRESS NOTE ADULT - SUBJECTIVE AND OBJECTIVE BOX
NEPHROLOGY INTERVAL HPI/OVERNIGHT EVENTS:  pt comfortable  no acute distress noted    MEDICATIONS  (STANDING):  anastrozole 1 milliGRAM(s) Oral daily  cefTRIAXone Injectable. 1000 milliGRAM(s) IV Push every 24 hours  heparin   Injectable 5000 Unit(s) SubCutaneous every 8 hours  hydrALAZINE 25 milliGRAM(s) Oral three times a day  influenza  Vaccine (HIGH DOSE) 0.7 milliLiter(s) IntraMuscular once  latanoprost 0.005% Ophthalmic Solution 1 Drop(s) Both EYES at bedtime  levothyroxine 75 MICROGram(s) Oral daily  metoprolol tartrate 75 milliGRAM(s) Oral every 6 hours  pantoprazole    Tablet 40 milliGRAM(s) Oral before breakfast  potassium chloride    Tablet ER 20 milliEquivalent(s) Oral daily  sodium chloride 2 Gram(s) Oral three times a day  tolvaptan 15 milliGRAM(s) Oral daily  urea Oral Powder 15 Gram(s) Oral two times a day  valsartan 80 milliGRAM(s) Oral <User Schedule>    MEDICATIONS  (PRN):  acetaminophen     Tablet .. 650 milliGRAM(s) Oral every 6 hours PRN Mild Pain (1 - 3)  hydrALAZINE Injectable 10 milliGRAM(s) IV Push every 2 hours PRN SBP> 150 and DBP> 90  labetalol Injectable 10 milliGRAM(s) IV Push every 2 hours PRN SBP> 150 and DBP> 90  senna 2 Tablet(s) Oral at bedtime PRN Constipation      Allergies    No Known Allergies        Vital Signs Last 24 Hrs  T(C): 37.6 (08 May 2024 07:46), Max: 37.6 (08 May 2024 07:46)  T(F): 99.7 (08 May 2024 07:46), Max: 99.7 (08 May 2024 07:46)  HR: 97 (08 May 2024 09:15) (97 - 129)  BP: 163/86 (08 May 2024 09:15) (106/75 - 166/121)  BP(mean): 105 (08 May 2024 09:15) (87 - 134)  RR: 18 (08 May 2024 09:15) (16 - 20)  SpO2: 97% (08 May 2024 09:15) (96% - 100%)    Parameters below as of 08 May 2024 09:15  Patient On (Oxygen Delivery Method): room air        PHYSICAL EXAM:  GENERAL: NAD  HEENMT: No periorbital edema  NECK: No JVD  NERVOUS SYSTEM: Awake. alert  CHEST/LUNG: Clear bilaterally  HEART: No rub  ABDOMEN: Soft +BS  EXTREMITIES: No dependent edema   SKIN: no rash    LABS:                        11.6   13.18 )-----------( 305      ( 08 May 2024 05:49 )             33.6     05-08    131<L>  |  98  |  35.9<H>  ----------------------------<  124<H>  3.8   |  23.0  |  0.45<L>        Ca    8.6      08 May 2024 05:49  Phos  2.9     05-08    TPro  5.5<L>  /  Alb  3.4  /  TBili  0.8  /  DBili  x   /  AST  21  /  ALT  19  /  AlkPhos  60  05-08      Urinalysis Basic - ( 08 May 2024 05:49 )    Color: x / Appearance: x / SG: x / pH: x  Gluc: 124 mg/dL / Ketone: x  / Bili: x / Urobili: x   Blood: x / Protein: x / Nitrite: x   Leuk Esterase: x / RBC: x / WBC x   Sq Epi: x / Non Sq Epi: x / Bacteria: x      Phosphorus: 2.9 mg/dL (05-08 @ 05:49)      RADIOLOGY & ADDITIONAL TESTS:

## 2024-05-08 NOTE — PROGRESS NOTE ADULT - PROBLEM SELECTOR PLAN 2
- EMS reported SBP > 200 upon arrival to ED  - In -151  - SBP goal per neuro is 130-150  - BP goal per neuro < 150/90  - more or less at goal, occasional HTN  - c/w metoprolol 75 mg q6H with hold parameters  - c/w hydralazine 25mg PO TID  - will change Valsartan to 160 PO BID with hold parameters  - check urine random for metanephrine - EMS reported SBP > 200 upon arrival to ED  - In -151  - SBP goal per neuro is 130-150  - BP goal per neuro < 150/90  - more or less at goal, occasional HTN  - c/w metoprolol 75 mg q6H with hold parameters  - c/w hydralazine 25mg PO TID  - will change Valsartan to 160 PO BID with hold parameters  - elevated BNP add Dyazide 37.5/25mg for BP control   - check urine random for metanephrine

## 2024-05-08 NOTE — PROGRESS NOTE ADULT - ASSESSMENT
84 y/o female with PMHx HTN, Breast Ca (dx 2020, surgery, no chemo/radiation) and A Fib (started taking  Eliquis 1 week ago, last dose 4/28 AM) BIBA to [facility]  from home due to headache. Patient had recently been admitted due to HTN after running out of her coreg. CT head found with intraventricular bleed and was transferred to [facility] ED for further evaluation. Pt was initially admitted to NICU team then downgraded to step down under neurology team. She was given Andexxa for Eliquis reversal and BP was controlled with nicardipine for SBP > 200s. Course was complicated by Afib rvr, hyponatremia and uti w/leukocytosis .Pt was started IV Rocephin today. Repeat CTH on 05/03 stable. TTE ef 55-60%,mild to mod TR. Cardiology and nephrology are following Pt will be transfer to medicine team from neurology team.   Of note patient endorses that she had fallen and hit her head a couple of days before initiation of Eliquis but did not communicate to team at that time.  AC on hold for at least 14 days per neuro. Pend MRI  brain pending    1-IVH/IPH  Suspect secondary to HTN emergency with underlying medication induced coagulopathy from Eliquis,  S/P recent fall  -repeat cth stable   BP controlled   cont neurocheck   Mr of brain stable bleed extention to ventricule   pt to get repeat Ct of barin 4 weesk after stroke from 4/28 per stroke team        2-Hypertensive urgency  -s/p cardene drip  --SBP goal per neuro is 130-150  -continue hydralazine, metoprolol, valsartan  adjust meds for better BOP control     3-Atrial fib RVR   CTA of chest r/o PE   cont metoprolol  resume AC in few weeks if repeat CT of brain stable       4-Hyponatremia  - started on sodium bicarb TID and urea BID  - on 800mL/day fluid restriction  - f/u Nephrology rec    5-UTI /e coli  cont iv abx rocephin   trend leukocytosis     6-Sepsis due to UTI   resolved   - ID consult appreciated      7-HX Breast cancer   -dx 2020, surgery, no chemo/radiation  -on Anastrozole    8-Hypothyroidism  -c/w synthroid  -tsh stable    9- Severe protein lupillo malnutrition   cont diet    ensure with meals   MVI     dvt ppx sq heparin  gi ppx PPI    discharge planing to [facility]

## 2024-05-08 NOTE — CONSULT NOTE ADULT - CONSULT REQUESTED DATE/TIME
28-Apr-2024 21:58
03-May-2024 15:55
01-May-2024 08:58
05-May-2024
30-Apr-2024 10:59
08-May-2024 17:07

## 2024-05-08 NOTE — CONSULT NOTE ADULT - NS ATTEND AMEND GEN_ALL_CORE FT
agree with a/p as above  htn emergency with intracranial bleed following with neurology team  peAF poor rate control increase av node blocking agents as above, monitor on tele  not on oac; if pt is deemed to be ok to resume oac or dapt in the future then would consider LAAO watchman implant as alternative therapy for stroke prevention in the setting of AF  I spent a total of 55 minutes on the encounter, including chart review, evaluating and discussing treatment options with the patient, as well as counseling and coordination as stated above.
seen with above,    83F history significant for breast cancer (2020 s/p resection on anastrazole), HTN, recently dx Afib and was started on Eliquis by her outside cardiologist, but reportedly she had a fall hitting her head few days prior to Eliquis use presented to [facility] with headache found acute cerebral bleeding transferred to [facility], found with Afib -140s and also uncontrolled SBP >200s required nicardipine gtt since transferred out of neuroICU  -switch carvedilol 25mg BID to metoprolol tartrate 75mg q8hrs for better HR control  -can increase valsartan to 80mg to keep SBP <160s  -CHADSVasc 4, but currently with acute cerebral bleeding and with recent fall injury/trauma not ideal candidate for long term AC use, consider elective outpatient ROSALINE Hendricks DO, [facility]  Faculty Non-Invasive Cardiologist  [phone]

## 2024-05-08 NOTE — PROGRESS NOTE ADULT - SUBJECTIVE AND OBJECTIVE BOX
Patient is a 83y old  Female who presents with a chief complaint of IVH (08 May 2024 10:23)      Patient seen and examined at bedside in am   BP is high   tachycardic , awake alert  No overnight events reported.     ALLERGIES:  No Known Allergies    MEDICATIONS  (STANDING):  anastrozole 1 milliGRAM(s) Oral daily  cefTRIAXone Injectable. 1000 milliGRAM(s) IV Push every 24 hours  heparin   Injectable 5000 Unit(s) SubCutaneous every 8 hours  hydrALAZINE 25 milliGRAM(s) Oral three times a day  influenza  Vaccine (HIGH DOSE) 0.7 milliLiter(s) IntraMuscular once  latanoprost 0.005% Ophthalmic Solution 1 Drop(s) Both EYES at bedtime  levothyroxine 75 MICROGram(s) Oral daily  metoprolol tartrate 75 milliGRAM(s) Oral every 6 hours  pantoprazole    Tablet 40 milliGRAM(s) Oral before breakfast  potassium chloride    Tablet ER 20 milliEquivalent(s) Oral daily  sodium chloride 2 Gram(s) Oral three times a day  tolvaptan 15 milliGRAM(s) Oral daily  urea Oral Powder 15 Gram(s) Oral two times a day  valsartan 160 milliGRAM(s) Oral every 12 hours    MEDICATIONS  (PRN):  acetaminophen     Tablet .. 650 milliGRAM(s) Oral every 6 hours PRN Mild Pain (1 - 3)  hydrALAZINE Injectable 10 milliGRAM(s) IV Push every 2 hours PRN SBP> 150 and DBP> 90  labetalol Injectable 10 milliGRAM(s) IV Push every 2 hours PRN SBP> 150 and DBP> 90  senna 2 Tablet(s) Oral at bedtime PRN Constipation    Vital Signs Last 24 Hrs  T(F): 99.7 (08 May 2024 07:46), Max: 99.7 (08 May 2024 07:46)  HR: 95 (08 May 2024 10:20) (95 - 129)  BP: 152/107 (08 May 2024 10:20) (106/75 - 166/121)  RR: 16 (08 May 2024 10:20) (16 - 20)  SpO2: 95% (08 May 2024 10:20) (95% - 100%)  I&O's Summary    07 May 2024 07:01  -  08 May 2024 07:00  --------------------------------------------------------  IN: 1200 mL / OUT: 2720 mL / NET: -1520 mL        PHYSICAL EXAM:  General: awake restless in the bed   Neck: supple , no JVD   Lungs: CTA   Cardio: RRR, S1/S2, No murmur  Abdomen: Soft, Nontender, Nondistended; Bowel sounds present  Extremities: No calf tenderness, No pitting edema    LABS:                        11.6   13.18 )-----------( 305      ( 08 May 2024 05:49 )             33.6     05-08    131  |  98  |  35.9  ----------------------------<  124  3.8   |  23.0  |  0.45    Ca    8.6      08 May 2024 05:49  Phos  2.9     05-08    TPro  5.5  /  Alb  3.4  /  TBili  0.8  /  DBili  x   /  AST  21  /  ALT  19  /  AlkPhos  60  05-08                    04-29 Chol 151 mg/dL LDL -- HDL 79 mg/dL Trig 62 mg/dL                  Urinalysis Basic - ( 08 May 2024 05:49 )    Color: x / Appearance: x / SG: x / pH: x  Gluc: 124 mg/dL / Ketone: x  / Bili: x / Urobili: x   Blood: x / Protein: x / Nitrite: x   Leuk Esterase: x / RBC: x / WBC x   Sq Epi: x / Non Sq Epi: x / Bacteria: x        Culture - Blood (collected 05 May 2024 09:10)  Source: .Blood Blood  Preliminary Report (07 May 2024 17:02):    No growth at 48 Hours    Culture - Blood (collected 04 May 2024 12:08)  Source: .Blood Blood-Peripheral  Preliminary Report (07 May 2024 22:01):    No growth at 72 Hours    Culture - Blood (collected 04 May 2024 11:42)  Source: .Blood Blood-Peripheral  Preliminary Report (07 May 2024 22:01):    No growth at 72 Hours    Culture - Urine (collected 04 May 2024 03:15)  Source: Clean Catch Clean Catch (Midstream)  Final Report (06 May 2024 13:20):    >100,000 CFU/ml Escherichia coli  Organism: Escherichia coli (06 May 2024 13:20)  Organism: Escherichia coli (06 May 2024 13:20)      -  Levofloxacin: S <=0.5      -  Tobramycin: S <=2      -  Nitrofurantoin: S <=32 Should not be used to treat pyelonephritis      -  Aztreonam: S <=4      -  Gentamicin: S <=2      -  Cefazolin: S <=2 For uncomplicated UTI with K. pneumoniae, E. coli, or P. mirablis: LEXI <=16 is sensitive and LEXI >=32 is resistant. This also predicts results for oral agents cefaclor, cefdinir, cefpodoxime, cefprozil, cefuroxime axetil, cephalexin and locarbef for uncomplicated UTI. Note that some isolates may be susceptible to these agents while testing resistant to cefazolin.      -  Cefepime: S <=2      -  Piperacillin/Tazobactam: S <=8      -  Ciprofloxacin: S <=0.25      -  Imipenem: S <=1      -  Ceftriaxone: S <=1      -  Ampicillin: R >16 These ampicillin results predict results for amoxicillin      Method Type: LEXI      -  Meropenem: S <=1      -  Ampicillin/Sulbactam: I 16/8      -  Cefoxitin: S <=8      -  Cefuroxime: S <=4      -  Amoxicillin/Clavulanic Acid: S <=8/4      -  Trimethoprim/Sulfamethoxazole: S <=0.5/9.5      -  Ertapenem: S <=0.5        RADIOLOGY & ADDITIONAL TESTS:

## 2024-05-08 NOTE — PROGRESS NOTE ADULT - PROBLEM SELECTOR PLAN 1
.  - Heart rates improved, occasional tachy episodes to 130s. May be driven by infection, volume status, de-conditioning, but needs further rate control.   - still with difficult to control rates in spite of medication increases  - CTA PE study today to rule out PE  - TSH normal  - c/w metoprolol 75 mg q6H with hold parameters  - EF preserved  - [id number]   -Here with intraventricular hemorrhage 2/2 trauma. AC on hold for at least 14 days per neuro. MR with stable hemorrhage  -Outpatient LAAO evaluation.  - records requested from Dr. North office, pending

## 2024-05-09 LAB
ANION GAP SERPL CALC-SCNC: 11 MMOL/L — SIGNIFICANT CHANGE UP (ref 5–17)
BUN SERPL-MCNC: 21.7 MG/DL — HIGH (ref 8–20)
CALCIUM SERPL-MCNC: 9 MG/DL — SIGNIFICANT CHANGE UP (ref 8.4–10.5)
CHLORIDE SERPL-SCNC: 94 MMOL/L — LOW (ref 96–108)
CO2 SERPL-SCNC: 25 MMOL/L — SIGNIFICANT CHANGE UP (ref 22–29)
CREAT SERPL-MCNC: 0.47 MG/DL — LOW (ref 0.5–1.3)
CULTURE RESULTS: SIGNIFICANT CHANGE UP
CULTURE RESULTS: SIGNIFICANT CHANGE UP
EGFR: 93 ML/MIN/1.73M2 — SIGNIFICANT CHANGE UP
GLUCOSE SERPL-MCNC: 108 MG/DL — HIGH (ref 70–99)
POTASSIUM SERPL-MCNC: 3.5 MMOL/L — SIGNIFICANT CHANGE UP (ref 3.5–5.3)
POTASSIUM SERPL-SCNC: 3.5 MMOL/L — SIGNIFICANT CHANGE UP (ref 3.5–5.3)
SODIUM SERPL-SCNC: 130 MMOL/L — LOW (ref 135–145)
SPECIMEN SOURCE: SIGNIFICANT CHANGE UP
SPECIMEN SOURCE: SIGNIFICANT CHANGE UP

## 2024-05-09 PROCEDURE — 99232 SBSQ HOSP IP/OBS MODERATE 35: CPT | Mod: 25

## 2024-05-09 PROCEDURE — 99233 SBSQ HOSP IP/OBS HIGH 50: CPT

## 2024-05-09 RX ORDER — DILTIAZEM HCL 120 MG
180 CAPSULE, EXT RELEASE 24 HR ORAL DAILY
Refills: 0 | Status: DISCONTINUED | OUTPATIENT
Start: 2024-05-10 | End: 2024-05-13

## 2024-05-09 RX ORDER — METOPROLOL TARTRATE 50 MG
150 TABLET ORAL
Refills: 0 | Status: DISCONTINUED | OUTPATIENT
Start: 2024-05-10 | End: 2024-05-13

## 2024-05-09 RX ORDER — POTASSIUM CHLORIDE 20 MEQ
20 PACKET (EA) ORAL ONCE
Refills: 0 | Status: COMPLETED | OUTPATIENT
Start: 2024-05-09 | End: 2024-05-09

## 2024-05-09 RX ORDER — FUROSEMIDE 40 MG
20 TABLET ORAL ONCE
Refills: 0 | Status: COMPLETED | OUTPATIENT
Start: 2024-05-09 | End: 2024-05-09

## 2024-05-09 RX ORDER — CEFTRIAXONE 500 MG/1
1000 INJECTION, POWDER, FOR SOLUTION INTRAMUSCULAR; INTRAVENOUS ONCE
Refills: 0 | Status: COMPLETED | OUTPATIENT
Start: 2024-05-10 | End: 2024-05-11

## 2024-05-09 RX ADMIN — Medication 1 TABLET(S): at 05:07

## 2024-05-09 RX ADMIN — Medication 20 MILLIGRAM(S): at 09:04

## 2024-05-09 RX ADMIN — VALSARTAN 160 MILLIGRAM(S): 80 TABLET ORAL at 05:06

## 2024-05-09 RX ADMIN — HEPARIN SODIUM 5000 UNIT(S): 5000 INJECTION INTRAVENOUS; SUBCUTANEOUS at 13:09

## 2024-05-09 RX ADMIN — CEFTRIAXONE 1000 MILLIGRAM(S): 500 INJECTION, POWDER, FOR SOLUTION INTRAMUSCULAR; INTRAVENOUS at 11:23

## 2024-05-09 RX ADMIN — Medication 25 MILLIGRAM(S): at 05:07

## 2024-05-09 RX ADMIN — HEPARIN SODIUM 5000 UNIT(S): 5000 INJECTION INTRAVENOUS; SUBCUTANEOUS at 05:06

## 2024-05-09 RX ADMIN — Medication 30 MILLIGRAM(S): at 18:10

## 2024-05-09 RX ADMIN — Medication 75 MICROGRAM(S): at 05:06

## 2024-05-09 RX ADMIN — LATANOPROST 1 DROP(S): 0.05 SOLUTION/ DROPS OPHTHALMIC; TOPICAL at 21:14

## 2024-05-09 RX ADMIN — Medication 75 MILLIGRAM(S): at 05:06

## 2024-05-09 RX ADMIN — SODIUM CHLORIDE 2 GRAM(S): 9 INJECTION INTRAMUSCULAR; INTRAVENOUS; SUBCUTANEOUS at 13:09

## 2024-05-09 RX ADMIN — VALSARTAN 160 MILLIGRAM(S): 80 TABLET ORAL at 18:28

## 2024-05-09 RX ADMIN — Medication 20 MILLIEQUIVALENT(S): at 09:03

## 2024-05-09 RX ADMIN — Medication 25 MILLIGRAM(S): at 13:09

## 2024-05-09 RX ADMIN — Medication 30 MILLIGRAM(S): at 00:16

## 2024-05-09 RX ADMIN — Medication 20 MILLIEQUIVALENT(S): at 11:18

## 2024-05-09 RX ADMIN — Medication 30 MILLIGRAM(S): at 11:18

## 2024-05-09 RX ADMIN — Medication 15 GRAM(S): at 05:21

## 2024-05-09 RX ADMIN — Medication 75 MILLIGRAM(S): at 18:09

## 2024-05-09 RX ADMIN — PANTOPRAZOLE SODIUM 40 MILLIGRAM(S): 20 TABLET, DELAYED RELEASE ORAL at 05:07

## 2024-05-09 RX ADMIN — ANASTROZOLE 1 MILLIGRAM(S): 1 TABLET ORAL at 11:20

## 2024-05-09 RX ADMIN — Medication 15 GRAM(S): at 18:10

## 2024-05-09 RX ADMIN — SODIUM CHLORIDE 2 GRAM(S): 9 INJECTION INTRAMUSCULAR; INTRAVENOUS; SUBCUTANEOUS at 21:13

## 2024-05-09 RX ADMIN — Medication 75 MILLIGRAM(S): at 11:18

## 2024-05-09 RX ADMIN — HEPARIN SODIUM 5000 UNIT(S): 5000 INJECTION INTRAVENOUS; SUBCUTANEOUS at 21:14

## 2024-05-09 RX ADMIN — Medication 30 MILLIGRAM(S): at 05:07

## 2024-05-09 RX ADMIN — SODIUM CHLORIDE 2 GRAM(S): 9 INJECTION INTRAMUSCULAR; INTRAVENOUS; SUBCUTANEOUS at 05:07

## 2024-05-09 RX ADMIN — Medication 75 MILLIGRAM(S): at 00:16

## 2024-05-09 NOTE — PROGRESS NOTE ADULT - NS ATTEND AMEND GEN_ALL_CORE FT
agree with a/p as above  htn emergency with intracranial bleed with mental status changed still episodes of confusion, aa0x3 following with neurology team  peAF better rate control now on current meds to be continued, switch to toprolol xl and cardizem er   not on oac; if pt has clinical improvement and recovers to a better functional status as outpatient can follow with EP to consider LAAO watchman implant as alternative therapy for stroke prevention in the setting of AF  cont care per primary team  call ep prn    I spent a total of 35 minutes on the encounter, including chart review, evaluating and discussing treatment options with the patient, as well as counseling and coordination as stated above.
Seen and examined with the ACP team. Plan discussed with ACPs.
as above
Seen and examined with the ACP team. Plan discussed with ACPs.
.
as above
as above
atrial fibrillation .  rapid ventricular rate .  Hypertension and brain bleed.  ct current dose of beta-blocker . increase tomorrow if heart rate elevted. currenly heart rate elevated luikely due to UTi and uro sepsi.  Need CBC with differential.  also low sodiuum.  appears dehdyration and hypovolemic too.   dicuss with nephrology apart from free water restriction and sodium bicarb.  consider Normakl saline in setting of infectiona dn UTI  will follow up tomorrow.
Patient seen and examined by me.    T(C): 37 (05-07-24 @ 08:22), Max: 37.3 (05-07-24 @ 00:09)  HR: 108 (05-07-24 @ 10:00) (85 - 120)  BP: 134/96 (05-07-24 @ 10:00) (134/96 - 166/115)  RR: 15 (05-07-24 @ 10:00) (15 - 20)  SpO2: 99% (05-07-24 @ 10:00) (97% - 99%)  Patient alert and awake.  Chest- Bilateral Clear BS  Cardiac- S1 and S2  Abdomen- Soft    Assessment/Plan:  1. Afib with RVR    I have discussed my recommendation with the PA which are outlined above.  Will follow.
atrial fibrillation with rapid ventricular rate . on beta-blocker . now with  uro sepsis. improving.   IV fluids.   if by tomorrow after sepsis controlled, if still tachcyardic despite the IV fluid > 120. then increase lopressor by 25 mg extra .   not on anticoagulation due to bleeding.  outpiatent laao evaln.
recnt diagnosis of atrial fibrillation on anticoagulation . and fell. mecahnical.  she has a monitor on her chest placed by her primary cardiolgoist  BP control. optimzing BP meds.  current bleeding and recent fall.  outpaitent evaluation for LAAO,.  will follow up for Bp management
BP not controlled. ICH.   add hydralzine ct other meds.   atrial fibrillation rate controlled .   not on anticoagulation due to ICH  will follow up tomorro tamir
Patient seen and examined by me.      Patient alert and awake.  Chest- Bilateral Clear BS  Cardiac- S1 and S2  Abdomen- Soft    Assessment/Plan:  1. Afib with controlled ventricular rate    I have discussed my recommendation with the PA which are outlined above.  Will sign off    MEDICATIONS  (STANDING):  anastrozole 1 milliGRAM(s) Oral daily  diltiazem    Tablet 30 milliGRAM(s) Oral every 6 hours  heparin   Injectable 5000 Unit(s) SubCutaneous every 8 hours  hydrALAZINE 25 milliGRAM(s) Oral three times a day  influenza  Vaccine (HIGH DOSE) 0.7 milliLiter(s) IntraMuscular once  latanoprost 0.005% Ophthalmic Solution 1 Drop(s) Both EYES at bedtime  levothyroxine 75 MICROGram(s) Oral daily  metoprolol tartrate 75 milliGRAM(s) Oral every 6 hours  pantoprazole    Tablet 40 milliGRAM(s) Oral before breakfast  potassium chloride    Tablet ER 20 milliEquivalent(s) Oral daily  sodium chloride 2 Gram(s) Oral three times a day  urea Oral Powder 15 Gram(s) Oral two times a day  valsartan 160 milliGRAM(s) Oral every 12 hours
Patient seen and examined by me.    Vital Signs Last 24 Hrs  T(C): 36.6 (05-07-24 @ 16:00), Max: 37.3 (05-07-24 @ 00:09)  T(F): 97.9 (05-07-24 @ 16:00), Max: 99.1 (05-07-24 @ 00:09)  HR: 119 (05-07-24 @ 16:00) (101 - 120)  BP: 162/109 (05-07-24 @ 16:00) (127/103 - 166/115)  BP(mean): 122 (05-07-24 @ 16:00) (103 - 129)  RR: 17 (05-07-24 @ 16:00) (15 - 20)  SpO2: 98% (05-07-24 @ 16:00) (97% - 100%)  Patient alert and awake.  Chest- Bilateral Clear BS  Cardiac- S1 and S2  Abdomen- Soft    Assessment/Plan:  1. Afib with RVR    I have discussed my recommendation with the PA which are outlined above.  Will follow.
Patient seen and examined by me.    Vital Signs Last 24 Hrs    Patient alert and awake.  Chest- Bilateral Clear BS  Cardiac- S1 and S2  Abdomen- Soft    Assessment/Plan:  1. Afib with RVR    I have discussed my recommendation with the PA which are outlined above.  Will follow.

## 2024-05-09 NOTE — PROGRESS NOTE ADULT - PROBLEM SELECTOR PROBLEM 2
Hypertensive urgency
HTN (hypertension)

## 2024-05-09 NOTE — PROGRESS NOTE ADULT - SUBJECTIVE AND OBJECTIVE BOX
[facility]                                                         CARDIOLOGY AT [address], [place] [address]                                                         Telephone: [phone]. Fax:[phone]                                                                             PROGRESS NOTE    Reason for follow up:    ICH, Afib with RVR, Hypertension  Update:   afib 60's, occasional increases up to 130's, nonsustained.      Review of symptoms:   Cardiac:  No chest pain. No dyspnea. No palpitations.  Respiratory: no cough. No dyspnea  Gastrointestinal: No diarrhea. No abdominal pain. No bleeding.   Neuro: No focal neuro complaints.    Vitals:  T(C): 36.6 (05-09-24 @ 07:52), Max: 36.9 (05-08-24 @ 20:30)  HR: 81 (05-09-24 @ 08:00) (71 - 125)  BP: 132/76 (05-09-24 @ 08:00) (129/97 - 165/96)  RR: 21 (05-09-24 @ 08:00) (15 - 22)  SpO2: 96% (05-09-24 @ 08:00) (95% - 97%)    I&O's Summary  08 May 2024 07:01  -  09 May 2024 07:00  --------------------------------------------------------  IN: 960 mL / OUT: 3300 mL / NET: -2340 mL  09 May 2024 07:01  -  09 May 2024 09:06  --------------------------------------------------------  IN: 0 mL / OUT: 225 mL / NET: -225 mL    PHYSICAL EXAM:  Appearance: Comfortable. No acute distress  HEENT:  Atraumatic. Normocephalic.  Normal oral mucosa  Neurologic: A & O x 2, no gross focal deficits.  Cardiovascular: RRR S1 S2, No murmur, no rubs/gallops. No JVD  Respiratory: Lungs clear to auscultation, unlabored   Gastrointestinal:  Soft, Non-tender, + BS  Lower Extremities:  Peripheral Pulses, No clubbing, cyanosis, or edema  Psychiatry: Patient is calm. No agitation.   Skin: warm and dry.    CURRENT CARDIAC MEDICATIONS:  diltiazem    Tablet 30 milliGRAM(s) Oral every 6 hours  hydrALAZINE 25 milliGRAM(s) Oral three times a day  hydrALAZINE Injectable 10 milliGRAM(s) IV Push every 2 hours PRN  labetalol Injectable 10 milliGRAM(s) IV Push every 2 hours PRN  metoprolol tartrate 75 milliGRAM(s) Oral every 6 hours  triamterene 37.5 mG/hydrochlorothiazide 25 mG Tablet 1 Tablet(s) Oral daily  urea Oral Powder 15 Gram(s) Oral two times a day  valsartan 160 milliGRAM(s) Oral every 12 hours    CURRENT OTHER MEDICATIONS:  cefTRIAXone Injectable. 1000 milliGRAM(s) IV Push every 24 hours  acetaminophen     Tablet .. 650 milliGRAM(s) Oral every 6 hours PRN Mild Pain (1 - 3)  pantoprazole    Tablet 40 milliGRAM(s) Oral before breakfast  senna 2 Tablet(s) Oral at bedtime PRN Constipation  levothyroxine 75 MICROGram(s) Oral daily  anastrozole 1 milliGRAM(s) Oral daily  heparin   Injectable 5000 Unit(s) SubCutaneous every 8 hours  influenza  Vaccine (HIGH DOSE) 0.7 milliLiter(s) IntraMuscular once  latanoprost 0.005% Ophthalmic Solution 1 Drop(s) Both EYES at bedtime  potassium chloride    Tablet ER 20 milliEquivalent(s) Oral daily  sodium chloride 2 Gram(s) Oral three times a day    LABS:	 	                         11.6   13.18 )-----------( 305      ( 08 May 2024 05:49 )             33.6     05-09    130<L>  |  94<L>  |  21.7<H>  ----------------------------<  108<H>  3.5   |  25.0  |  0.47<L>    Ca    9.0      09 May 2024 06:06  Phos  2.9     05-08    TPro  5.5<L>  /  Alb  3.4  /  TBili  0.8  /  DBili  x   /  AST  21  /  ALT  19  /  AlkPhos  60  05-08    PT/INR/PTT ( 29 Apr 2024 06:00 )                       :                       :      13.8         :       27.9                  .        .                   .              .           .       1.25        .                                       Lipid Profile: Date: 04-29 @ 06:00  Total cholesterol 151; Direct LDL: --; HDL: 79; Triglycerides:62    TELEMETRY:   afib 60's, occasional increases up to 130's, nonsustained.

## 2024-05-09 NOTE — PROGRESS NOTE ADULT - NS ATTEND OPT1 GEN_ALL_CORE

## 2024-05-09 NOTE — PROGRESS NOTE ADULT - ASSESSMENT
84 y/o female with PMHx HTN, Breast Ca (dx 2020, surgery, no chemo/radiation) and A Fib (started taking  Eliquis 1 week ago, last dose 4/28 AM) BIBA to [facility]  from home due to headache. Patient had recently been admitted due to HTN after running out of her coreg. CT head found with intraventricular bleed and was transferred to [facility] ED for further evaluation. Pt was initially admitted to NICU team then downgraded to step down under neurology team. She was given Andexxa for Eliquis reversal and BP was controlled with nicardipine for SBP > 200s. Course was complicated by Afib rvr, hyponatremia and uti w/leukocytosis .Pt was started IV Rocephin today. Repeat CTH on 05/03 stable. TTE ef 55-60%,mild to mod TR. Cardiology and nephrology are following Pt will be transfer to medicine team from neurology team.   Of note patient endorses that she had fallen and hit her head a couple of days before initiation of Eliquis but did not communicate to team at that time.  AC on hold for at least 14 days per neuro. Pend MRI  brain pending    1-IVH/IPH  Suspect secondary to HTN emergency with underlying medication induced coagulopathy from Eliquis,  S/P recent fall  repeat cth stable   BP controlled   cont neurocheck   Mr of brain stable bleed extention to ventricule   d/w NS team pt to get repeat Ct of brain  4 weesk after stroke from 4/28 per stroke team and follow up in the office to resume AC    Dc planing to [facility]      2-Hypertension   s/p urgency and cardene drip   -s/p cardene drip  --SBP goal per neuro is 130-150  -continue hydralazine, metoprolol, valsartan dose increased for BP control     3-Atrial fib RVR   CTA of chest negative for PE   EP input appreciated   metoprolol dose increased ., added cardizem change to long acting   resume AC in few weeks if repeat CT of brain stable and follow up with NS team in the office     4-Hyponatremia, improving    probable SIADH   nephrology input appreciated   cont urea , NA bicarbonate   s/p tolvaptan trial   - cont fluid restriction  - f/u Nephrology rec    5-UTI /e coli  complete course of iv rocephin last dose 5/10   trend leukocytosis     6-Sepsis due to UTI   resolved   ID consult appreciated    7-HX Breast cancer   -dx 2020, surgery, no chemo/radiation  -on Anastrozole    8-Hypothyroidism  -c/w synthroid    9- Severe protein lupillo malnutrition   cont diet   ensure with meals   MVI     dvt ppx sq heparin    Dc planing to [facility]   likely in 1-2 days if lytes and BP stable   gi ppx PPI    d/w brother  on 5/7 over phone updated   Umberto Butler: Brother lives in [place]  [phone]

## 2024-05-09 NOTE — PROGRESS NOTE ADULT - PROBLEM SELECTOR PROBLEM 3
IVH (intraventricular hemorrhage)

## 2024-05-09 NOTE — PROGRESS NOTE ADULT - SUBJECTIVE AND OBJECTIVE BOX
NEPHROLOGY INTERVAL HPI/OVERNIGHT EVENTS:  pt comfortable earlier  no acute overnight events  tolerating diet    MEDICATIONS  (STANDING):  anastrozole 1 milliGRAM(s) Oral daily  diltiazem    Tablet 30 milliGRAM(s) Oral every 6 hours  heparin   Injectable 5000 Unit(s) SubCutaneous every 8 hours  hydrALAZINE 25 milliGRAM(s) Oral three times a day  influenza  Vaccine (HIGH DOSE) 0.7 milliLiter(s) IntraMuscular once  latanoprost 0.005% Ophthalmic Solution 1 Drop(s) Both EYES at bedtime  levothyroxine 75 MICROGram(s) Oral daily  metoprolol tartrate 75 milliGRAM(s) Oral every 6 hours  pantoprazole    Tablet 40 milliGRAM(s) Oral before breakfast  potassium chloride    Tablet ER 20 milliEquivalent(s) Oral daily  sodium chloride 2 Gram(s) Oral three times a day  triamterene 37.5 mG/hydrochlorothiazide 25 mG Tablet 1 Tablet(s) Oral daily  urea Oral Powder 15 Gram(s) Oral two times a day  valsartan 160 milliGRAM(s) Oral every 12 hours    MEDICATIONS  (PRN):  acetaminophen     Tablet .. 650 milliGRAM(s) Oral every 6 hours PRN Mild Pain (1 - 3)  hydrALAZINE Injectable 10 milliGRAM(s) IV Push every 2 hours PRN SBP> 150 and DBP> 90  labetalol Injectable 10 milliGRAM(s) IV Push every 2 hours PRN SBP> 150 and DBP> 90  senna 2 Tablet(s) Oral at bedtime PRN Constipation      Allergies    No Known Allergies          Vital Signs Last 24 Hrs  T(C): 36.6 (09 May 2024 07:52), Max: 36.9 (08 May 2024 20:30)  T(F): 97.9 (09 May 2024 07:52), Max: 98.5 (08 May 2024 20:30)  HR: 93 (09 May 2024 11:19) (71 - 125)  BP: 116/83 (09 May 2024 11:19) (116/83 - 165/96)  BP(mean): 92 (09 May 2024 11:19) (92 - 117)  RR: 20 (09 May 2024 10:00) (15 - 22)  SpO2: 97% (09 May 2024 10:00) (95% - 97%)    Parameters below as of 09 May 2024 10:00  Patient On (Oxygen Delivery Method): room air        PHYSICAL EXAM:  GENERAL: Weak, fatigued  HEENMT: Moist MMs  NECK: No JVD  NERVOUS SYSTEM: Awake. alert  CHEST/LUNG: Clear bilaterally  HEART: No rub  ABDOMEN: Soft +BS  EXTREMITIES: No dependent edema   SKIN: no rash    LABS:                        11.6   13.18 )-----------( 305      ( 08 May 2024 05:49 )             33.6     05-09    130<L>  |  94<L>  |  21.7<H>  ----------------------------<  108<H>  3.5   |  25.0  |  0.47<L>    Ca    9.0      09 May 2024 06:06  Phos  2.9     05-08    TPro  5.5<L>  /  Alb  3.4  /  TBili  0.8  /  DBili  x   /  AST  21  /  ALT  19  /  AlkPhos  60  05-08      Urinalysis Basic - ( 09 May 2024 06:06 )    Color: x / Appearance: x / SG: x / pH: x  Gluc: 108 mg/dL / Ketone: x  / Bili: x / Urobili: x   Blood: x / Protein: x / Nitrite: x   Leuk Esterase: x / RBC: x / WBC x   Sq Epi: x / Non Sq Epi: x / Bacteria: x          RADIOLOGY & ADDITIONAL TESTS:

## 2024-05-09 NOTE — PROGRESS NOTE ADULT - ASSESSMENT
82 y/o female with PMHx HTN, Breast Ca (dx 2020, surgery, no chemo/radiation) and A Fib (started taking  Eliquisx1 week ago, last dose 4/28 AM) BIBA to [facility]  from home due to headache. Patient had recently been admitted due to HTN after running out of her coreg. CT head found with intraventricular bleed and was transferred to [facility] ED for further evaluation. She was given Andexxa for Eliquis reversal and BP was controlled with nicardipine for SBP > 200s. She recently lost her  and has not been compliant with medications. She is now weaned off nicardipine. She denies chest pain, back pain, SOB, LOVELACE, diaphoresis, syncope or N/V. Of note patient endorses that she had fallen and hit her head a couple of days before initiation of Eliquis but did not communicate to team at that time.

## 2024-05-09 NOTE — PROGRESS NOTE ADULT - PROBLEM SELECTOR PLAN 2
EMS reported SBP > 200 upon arrival to ED  In -151  SBP goal per neuro is 130-150  BP goal per neuro < 150/90  more or less at goal, occasional HTN  c/w metoprolol 75 mg q6H with hold parameters  c/w hydralazine 25mg PO TID  will change Valsartan to 160 PO BID with hold parameters  elevated BNP add Dyazide 37.5/25mg for BP control   check urine random for metanephrine. EMS reported SBP > 200 upon arrival to ED  /68  In -151  SBP goal per neuro is 130-150  BP goal per neuro < 150/90  more or less at goal, occasional HTN  c/w metoprolol 75 mg q6H with hold parameters  c/w hydralazine 25mg PO TID  will change Valsartan to 160 PO BID with hold parameters  elevated BNP add Dyazide 37.5/25mg for BP control   check urine random for metanephrine. EMS reported SBP > 200 upon arrival to ED  /68  In -151  SBP goal per neuro is 130-150  BP goal per neuro < 150/90  more or less at goal, occasional HTN  Continue metoprolol 75 mg q6H with hold parameters  Continue hydralazine 25mg PO TID  Continue Valsartan to 160 PO BID with hold parameters  elevated BNP add Dyazide 37.5/25mg for BP control   check urine random for metanephrine.

## 2024-05-09 NOTE — PROGRESS NOTE ADULT - ASSESSMENT
84yo female with HTN, HLD, Breast CA s/p surgery 2020 (no chemo/radiation) and AF recently prescribed Eliquis who was BIBA to [facility] from home due to headache found to be HTN with SBP>200 with an intraventricular bleed. He was transferred to [facility] ED for further evaluation. She reports hitting her head a couple of days before initiation of Eliquis. Hospital course complicated by AF w/ RVR, hyponatremia, and UTI w/leukocytosis. TTE with preserved EF 55-60%, mild-mod TR. Pt was started on Metoprolol (currently on 75mg every 6 hours) with difficult to control rates for which EP was consulted for.       Recommendations:    #Atrial fibrillation   - Heart rates have now been achieved on Metoprolol 75mg every 6 hours and Cardizem 30mg every 6 hours. Recommend transitioning to long acting formulation tomorrow (Toprol 150mg twice daily and Cardizem 180mg daily). If HR increase can increase Cardizem to 240mg daily.   - Continue to monitor on telemetry   - [id number] = 4; age, sex, HTN. Recommend restarting therapeutic AC once cleared by Neurology team. Would benefit from LAAO evaluation, which can be done in the outpatient setting pending patients clinical course.   - TSH normal  - TTE with preserved EF  - EP will sign off, please reconsult as needed.

## 2024-05-09 NOTE — PROGRESS NOTE ADULT - ASSESSMENT
Euvolemic hyponatremia: urine studies suggest SIADH  Serum Na+ improved/stable post Tolvaptan trial  - reinforced PO fluid restriction  - cont NaCl tabs  - added PO Urea  - stop HCTZ  - trend labs

## 2024-05-09 NOTE — PROGRESS NOTE ADULT - PROBLEM SELECTOR PLAN 3
.  - primary management per neurosurgery team
.  - primary management per neurosurgery team
Primary management per neurosurgery team.
.  - pending MRI  - primary management per Neuro team
.  - pending MRI  - primary management per Neuro team.

## 2024-05-09 NOTE — PROGRESS NOTE ADULT - SUBJECTIVE AND OBJECTIVE BOX
Patient is a 83y old  Female who presents with a chief complaint of IVH       Patient seen and examined at bedside in am . she is awake , confused to events   no complaints , no overnight events   BP and HR is better controlled     labs reviewed       ALLERGIES:  No Known Allergies    MEDICATIONS  (STANDING):  anastrozole 1 milliGRAM(s) Oral daily  cefTRIAXone Injectable. 1000 milliGRAM(s) IV Push every 24 hours  heparin   Injectable 5000 Unit(s) SubCutaneous every 8 hours  hydrALAZINE 25 milliGRAM(s) Oral three times a day  influenza  Vaccine (HIGH DOSE) 0.7 milliLiter(s) IntraMuscular once  latanoprost 0.005% Ophthalmic Solution 1 Drop(s) Both EYES at bedtime  levothyroxine 75 MICROGram(s) Oral daily  metoprolol tartrate 75 milliGRAM(s) Oral every 6 hours  pantoprazole    Tablet 40 milliGRAM(s) Oral before breakfast  potassium chloride    Tablet ER 20 milliEquivalent(s) Oral daily  sodium chloride 2 Gram(s) Oral three times a day  tolvaptan 15 milliGRAM(s) Oral daily  urea Oral Powder 15 Gram(s) Oral two times a day  valsartan 160 milliGRAM(s) Oral every 12 hours    MEDICATIONS  (PRN):  acetaminophen     Tablet .. 650 milliGRAM(s) Oral every 6 hours PRN Mild Pain (1 - 3)  hydrALAZINE Injectable 10 milliGRAM(s) IV Push every 2 hours PRN SBP> 150 and DBP> 90  labetalol Injectable 10 milliGRAM(s) IV Push every 2 hours PRN SBP> 150 and DBP> 90  senna 2 Tablet(s) Oral at bedtime PRN Constipation    Vital Signs Last 24 Hrs  T(C): 36.3 (09 May 2024 13:50), Max: 36.9 (08 May 2024 20:30)  T(F): 97.3 (09 May 2024 13:50), Max: 98.5 (08 May 2024 20:30)  HR: 68 (09 May 2024 13:50) (68 - 125)  BP: 125/72 (09 May 2024 13:50) (113/68 - 165/96)  BP(mean): 82 (09 May 2024 13:17) (82 - 117)  RR: 18 (09 May 2024 13:50) (16 - 22)  SpO2: 96% (09 May 2024 13:50) (95% - 99%)    Parameters below as of 09 May 2024 13:17  Patient On (Oxygen Delivery Method): room air        PHYSICAL EXAM:  General: awake restless in the bed   Neck: supple , no JVD   Lungs: CTA   Cardio: RRR, S1/S2, No murmur  Abdomen: Soft, Nontender, Nondistended; Bowel sounds present  Extremities: No calf tenderness, No pitting edema    LABS:                        11.6   13.18 )-----------( 305      ( 08 May 2024 05:49 )             33.6     05-08    131  |  98  |  35.9  ----------------------------<  124  3.8   |  23.0  |  0.45    Ca    8.6      08 May 2024 05:49  Phos  2.9     05-08    TPro  5.5  /  Alb  3.4  /  TBili  0.8  /  DBili  x   /  AST  21  /  ALT  19  /  AlkPhos  60  05-08                         uCulture - Urine (05.04.24 @ 03:15)   - Amoxicillin/Clavulanic Acid: S <=8/4  - Ampicillin: R >16 These ampicillin results predict results for amoxicillin  - Ampicillin/Sulbactam: I 16/8  - Aztreonam: S <=4  - Cefazolin: S <=2 For uncomplicated UTI with K. pneumoniae, E. coli, or P. mirablis: LEXI <=16 is sensitive and LEXI >=32 is resistant. This also predicts results for oral agents cefaclor, cefdinir, cefpodoxime, cefprozil, cefuroxime axetil, cephalexin and locarbef for uncomplicated UTI. Note that some isolates may be susceptible to these agents while testing resistant to cefazolin.  - Cefepime: S <=2  - Cefoxitin: S <=8  - Ceftriaxone: S <=1  - Cefuroxime: S <=4  - Ciprofloxacin: S <=0.25  - Ertapenem: S <=0.5  - Gentamicin: S <=2  - Imipenem: S <=1  - Levofloxacin: S <=0.5  - Meropenem: S <=1  - Nitrofurantoin: S <=32 Should not be used to treat pyelonephritis  - Piperacillin/Tazobactam: S <=8  - Tobramycin: S <=2  - Trimethoprim/Sulfamethoxazole: S <=0.5/9.5  Specimen Source: Clean Catch Clean Catch (Midstream)  Culture Results:   >100,000 CFU/ml Escherichia coli  Organism Identification: Escherichia coli  Organism: Escherichia coli  Method Type: LEXI

## 2024-05-09 NOTE — PROGRESS NOTE ADULT - SUBJECTIVE AND OBJECTIVE BOX
Subjective: Pt seen and examined, appears more alert today. Denies any complaint. Telemetry reviewed- remains in AF, now with controlled rates.     TELE: AF ~80bpm     MEDICATIONS  (STANDING):  anastrozole 1 milliGRAM(s) Oral daily  cefTRIAXone Injectable. 1000 milliGRAM(s) IV Push every 24 hours  diltiazem    Tablet 30 milliGRAM(s) Oral every 6 hours  heparin   Injectable 5000 Unit(s) SubCutaneous every 8 hours  hydrALAZINE 25 milliGRAM(s) Oral three times a day  influenza  Vaccine (HIGH DOSE) 0.7 milliLiter(s) IntraMuscular once  latanoprost 0.005% Ophthalmic Solution 1 Drop(s) Both EYES at bedtime  levothyroxine 75 MICROGram(s) Oral daily  metoprolol tartrate 75 milliGRAM(s) Oral every 6 hours  pantoprazole    Tablet 40 milliGRAM(s) Oral before breakfast  potassium chloride    Tablet ER 20 milliEquivalent(s) Oral daily  sodium chloride 2 Gram(s) Oral three times a day  triamterene 37.5 mG/hydrochlorothiazide 25 mG Tablet 1 Tablet(s) Oral daily  urea Oral Powder 15 Gram(s) Oral two times a day  valsartan 160 milliGRAM(s) Oral every 12 hours    MEDICATIONS  (PRN):  acetaminophen     Tablet .. 650 milliGRAM(s) Oral every 6 hours PRN Mild Pain (1 - 3)  hydrALAZINE Injectable 10 milliGRAM(s) IV Push every 2 hours PRN SBP> 150 and DBP> 90  labetalol Injectable 10 milliGRAM(s) IV Push every 2 hours PRN SBP> 150 and DBP> 90  senna 2 Tablet(s) Oral at bedtime PRN Constipation      Allergies  No Known Allergies      Vital Signs Last 24 Hrs  T(C): 36.6 (09 May 2024 07:52), Max: 36.9 (08 May 2024 20:30)  T(F): 97.9 (09 May 2024 07:52), Max: 98.5 (08 May 2024 20:30)  HR: 81 (09 May 2024 08:00) (71 - 125)  BP: 132/76 (09 May 2024 08:00) (129/97 - 165/96)  BP(mean): 93 (09 May 2024 08:00) (93 - 124)  RR: 21 (09 May 2024 08:00) (15 - 22)  SpO2: 96% (09 May 2024 08:00) (95% - 97%)    Parameters below as of 09 May 2024 08:00  Patient On (Oxygen Delivery Method): room air        Physical Exam:  Constitutional: AAO to person, place, and ttime  Neck: supple, No JVD  Cardiovascular: +S1S2 irregular, tachycardic   Pulmonary: CTA b/l, unlabored, no wheezes, rales. rhonci  Abdomen: +BS, soft NTND  Extremities: no edema b/l    LABS:                        11.6   13.18 )-----------( 305      ( 08 May 2024 05:49 )             33.6     05-09    130<L>  |  94<L>  |  21.7<H>  ----------------------------<  108<H>  3.5   |  25.0  |  0.47<L>    Ca    9.0      09 May 2024 06:06  Phos  2.9     05-08    TPro  5.5<L>  /  Alb  3.4  /  TBili  0.8  /  DBili  x   /  AST  21  /  ALT  19  /  AlkPhos  60  05-08      Urinalysis Basic - ( 09 May 2024 06:06 )    Color: x / Appearance: x / SG: x / pH: x  Gluc: 108 mg/dL / Ketone: x  / Bili: x / Urobili: x   Blood: x / Protein: x / Nitrite: x   Leuk Esterase: x / RBC: x / WBC x   Sq Epi: x / Non Sq Epi: x / Bacteria: x        RADIOLOGY & ADDITIONAL TESTS:  RADIOLOGY & ADDITIONAL STUDIES:  TTE 4/30/2023  CONCLUSIONS:     1. Limited study f/u for LV function.   2. Left ventricular cavity is normal in size. Left ventricular systolic function is normal with an ejection fraction visually estimated at 55 to 60 %.   3. Normal right ventricular cavity size and normal systolic function.   4. Mild to moderate tricuspid regurgitation.   5. No pericardial effusion seen.   6. Estimated pulmonary artery systolic pressure is 60 mmHg, consistent with severe pulmonary hypertension.    CT Head No Cont (05.06.24 @ 17:30)   IMPRESSION: Stable acute left basal ganglia/caudate head hemorrhage   extending into the lateral ventricles. Mildly improved intraventricular   hemorrhage in the occipital horns. Stable mild-to-moderate hydrocephalus.   No new hemorrhage.    MR Head w/wo IV Cont (05.05.24 @ 15:54)   IMPRESSION: Stable left basal ganglia hemorrhage with extension to the lateral ventricle. Stable mild-to-moderate hydrocephalus. No abnormal enhancement to suggest underlying lesion.    CT Head No Cont (05.03.24 @ 02:57)   IMPRESSION: Left caudate nucleus intraparenchymal hemorrhage with intraventricular extension, grossly stable overall. Mild hydrocephalus is grossly stable.    CT Head No Cont (05.01.24 @ 08:30)   IMPRESSION: Stable exam.    CT Head No Cont (04.29.24 @ 20:33)   IMPRESSION: Stable acute small left caudate head intraparenchymal hemorrhage measuring 6 mm. Moderate intraventricular hemorrhage   throughout the left lateral ventricle, third ventricle and fourth ventricle. Mild intraventricular hemorrhage in the right lateral ventricle. Mild to moderate hydrocephalus, unchanged..    CT Head No Cont (04.28.24 @ 22:53) >  IMPRESSION: No change in intraventricular hemorrhage and dilatation of the lateral and third ventricles compared with prior exam. Ill-defined hemorrhage again seen in the left caudate body and left caudate head.    CT Angio Brain Stroke Protocol  w/ IV Cont (04.28.24 @ 20:02) >IMPRESSION:  CTA NECK:  No significant stenosis of the cervical carotid arteries based on NASCET criteria. Patent cervical vertebral arteries.  CTA HEAD: No high-grade stenosis or occlusion of the major proximal arterial branches. No evidence of an intracranial arterial aneurysm or   arteriovenous malformation on CTA. No evidence of active bleeding within the left caudate and intraventricular hemorrhage.

## 2024-05-09 NOTE — PROGRESS NOTE ADULT - PROBLEM SELECTOR PLAN 1
Afib 60's, occasional increases up to 130's, nonsustained.  CHADSVasc 4,  Seen by EP and started on Cardizem 30mg q 6 - titrate as per EP  Ct with metoprolol 75mg q 6i  Here with intraventricular hemorrhage 2/2 trauma. AC on hold per neuro.   MR with stable hemorrhage  Per neuro recommend hold anticoagulation x 4 weeks post-stroke, then obtain repeat CTH to evaluate for stability prior to resuming Eliquis (Anticoagulation only to be restarted if BP remains stable and if she can demonstrate compliance.  EP to consider elective outpatient LAAO

## 2024-05-10 LAB
ALBUMIN SERPL ELPH-MCNC: 3.4 G/DL — SIGNIFICANT CHANGE UP (ref 3.3–5.2)
ALP SERPL-CCNC: 75 U/L — SIGNIFICANT CHANGE UP (ref 40–120)
ALT FLD-CCNC: 20 U/L — SIGNIFICANT CHANGE UP
ANION GAP SERPL CALC-SCNC: 14 MMOL/L — SIGNIFICANT CHANGE UP (ref 5–17)
AST SERPL-CCNC: 21 U/L — SIGNIFICANT CHANGE UP
BILIRUB SERPL-MCNC: 0.8 MG/DL — SIGNIFICANT CHANGE UP (ref 0.4–2)
BUN SERPL-MCNC: 28.3 MG/DL — HIGH (ref 8–20)
CALCIUM SERPL-MCNC: 8.9 MG/DL — SIGNIFICANT CHANGE UP (ref 8.4–10.5)
CHLORIDE SERPL-SCNC: 93 MMOL/L — LOW (ref 96–108)
CO2 SERPL-SCNC: 21 MMOL/L — LOW (ref 22–29)
CREAT SERPL-MCNC: 0.53 MG/DL — SIGNIFICANT CHANGE UP (ref 0.5–1.3)
CULTURE RESULTS: SIGNIFICANT CHANGE UP
EGFR: 91 ML/MIN/1.73M2 — SIGNIFICANT CHANGE UP
GLUCOSE SERPL-MCNC: 99 MG/DL — SIGNIFICANT CHANGE UP (ref 70–99)
HCT VFR BLD CALC: 38.1 % — SIGNIFICANT CHANGE UP (ref 34.5–45)
HGB BLD-MCNC: 12.7 G/DL — SIGNIFICANT CHANGE UP (ref 11.5–15.5)
MAGNESIUM SERPL-MCNC: 1.8 MG/DL — SIGNIFICANT CHANGE UP (ref 1.6–2.6)
MCHC RBC-ENTMCNC: 30.1 PG — SIGNIFICANT CHANGE UP (ref 27–34)
MCHC RBC-ENTMCNC: 33.3 GM/DL — SIGNIFICANT CHANGE UP (ref 32–36)
MCV RBC AUTO: 90.3 FL — SIGNIFICANT CHANGE UP (ref 80–100)
PLATELET # BLD AUTO: 311 K/UL — SIGNIFICANT CHANGE UP (ref 150–400)
POTASSIUM SERPL-MCNC: 4 MMOL/L — SIGNIFICANT CHANGE UP (ref 3.5–5.3)
POTASSIUM SERPL-SCNC: 4 MMOL/L — SIGNIFICANT CHANGE UP (ref 3.5–5.3)
PROT SERPL-MCNC: 5.9 G/DL — LOW (ref 6.6–8.7)
RBC # BLD: 4.22 M/UL — SIGNIFICANT CHANGE UP (ref 3.8–5.2)
RBC # FLD: 13.8 % — SIGNIFICANT CHANGE UP (ref 10.3–14.5)
SODIUM SERPL-SCNC: 128 MMOL/L — LOW (ref 135–145)
SPECIMEN SOURCE: SIGNIFICANT CHANGE UP
VIT B12 SERPL-MCNC: 1807 PG/ML — HIGH (ref 232–1245)
WBC # BLD: 15.69 K/UL — HIGH (ref 3.8–10.5)
WBC # FLD AUTO: 15.69 K/UL — HIGH (ref 3.8–10.5)

## 2024-05-10 PROCEDURE — 99232 SBSQ HOSP IP/OBS MODERATE 35: CPT

## 2024-05-10 RX ADMIN — Medication 25 MILLIGRAM(S): at 13:37

## 2024-05-10 RX ADMIN — HEPARIN SODIUM 5000 UNIT(S): 5000 INJECTION INTRAVENOUS; SUBCUTANEOUS at 05:10

## 2024-05-10 RX ADMIN — SODIUM CHLORIDE 2 GRAM(S): 9 INJECTION INTRAMUSCULAR; INTRAVENOUS; SUBCUTANEOUS at 13:38

## 2024-05-10 RX ADMIN — Medication 25 MILLIGRAM(S): at 05:10

## 2024-05-10 RX ADMIN — Medication 25 MILLIGRAM(S): at 22:28

## 2024-05-10 RX ADMIN — HEPARIN SODIUM 5000 UNIT(S): 5000 INJECTION INTRAVENOUS; SUBCUTANEOUS at 22:28

## 2024-05-10 RX ADMIN — ANASTROZOLE 1 MILLIGRAM(S): 1 TABLET ORAL at 13:37

## 2024-05-10 RX ADMIN — SODIUM CHLORIDE 2 GRAM(S): 9 INJECTION INTRAMUSCULAR; INTRAVENOUS; SUBCUTANEOUS at 22:28

## 2024-05-10 RX ADMIN — PANTOPRAZOLE SODIUM 40 MILLIGRAM(S): 20 TABLET, DELAYED RELEASE ORAL at 05:13

## 2024-05-10 RX ADMIN — Medication 180 MILLIGRAM(S): at 05:11

## 2024-05-10 RX ADMIN — SODIUM CHLORIDE 2 GRAM(S): 9 INJECTION INTRAMUSCULAR; INTRAVENOUS; SUBCUTANEOUS at 05:11

## 2024-05-10 RX ADMIN — Medication 75 MICROGRAM(S): at 05:10

## 2024-05-10 RX ADMIN — VALSARTAN 160 MILLIGRAM(S): 80 TABLET ORAL at 18:18

## 2024-05-10 RX ADMIN — Medication 20 MILLIEQUIVALENT(S): at 13:37

## 2024-05-10 RX ADMIN — Medication 15 GRAM(S): at 05:11

## 2024-05-10 RX ADMIN — VALSARTAN 160 MILLIGRAM(S): 80 TABLET ORAL at 05:26

## 2024-05-10 RX ADMIN — Medication 150 MILLIGRAM(S): at 18:11

## 2024-05-10 RX ADMIN — HEPARIN SODIUM 5000 UNIT(S): 5000 INJECTION INTRAVENOUS; SUBCUTANEOUS at 13:36

## 2024-05-10 RX ADMIN — Medication 15 GRAM(S): at 18:11

## 2024-05-10 RX ADMIN — LATANOPROST 1 DROP(S): 0.05 SOLUTION/ DROPS OPHTHALMIC; TOPICAL at 22:29

## 2024-05-10 RX ADMIN — Medication 150 MILLIGRAM(S): at 05:10

## 2024-05-10 NOTE — CHART NOTE - NSCHARTNOTEFT_GEN_A_CORE
Source: Patient [x]  Family [ ]   other [ ]    Current Diet:   Diet, Regular:   1000mL Fluid Restriction ([id number]) (05-06-24 @ 10:32)    Patient reports [ ] nausea  [ ] vomiting [ ] diarrhea [ ] constipation  [ ]chewing problems [ ] swallowing issues  [ ] other:     PO intake:  < 50% [x]   50-75%  [ ]   %  [ ]  other :    Source for PO intake [ ] Patient [ ] family [ x] chart [x ] staff [ ] other    Current Weight:   (5/10) 130.3 lbs RD bed scale weight   (4/29) 133.1 lbs     % Weight Change: Possible ~3lb weight loss     Pertinent Medications: MEDICATIONS  (STANDING):  anastrozole 1 milliGRAM(s) Oral daily  cefTRIAXone Injectable. 1000 milliGRAM(s) IV Push once  diltiazem    milliGRAM(s) Oral daily  heparin   Injectable 5000 Unit(s) SubCutaneous every 8 hours  hydrALAZINE 25 milliGRAM(s) Oral three times a day  influenza  Vaccine (HIGH DOSE) 0.7 milliLiter(s) IntraMuscular once  latanoprost 0.005% Ophthalmic Solution 1 Drop(s) Both EYES at bedtime  levothyroxine 75 MICROGram(s) Oral daily  metoprolol succinate  milliGRAM(s) Oral two times a day  pantoprazole    Tablet 40 milliGRAM(s) Oral before breakfast  potassium chloride    Tablet ER 20 milliEquivalent(s) Oral daily  sodium chloride 2 Gram(s) Oral three times a day  urea Oral Powder 15 Gram(s) Oral two times a day  valsartan 160 milliGRAM(s) Oral every 12 hours    MEDICATIONS  (PRN):  acetaminophen     Tablet .. 650 milliGRAM(s) Oral every 6 hours PRN Mild Pain (1 - 3)  hydrALAZINE Injectable 10 milliGRAM(s) IV Push every 2 hours PRN SBP> 150 and DBP> 90  labetalol Injectable 10 milliGRAM(s) IV Push every 2 hours PRN SBP> 150 and DBP> 90  senna 2 Tablet(s) Oral at bedtime PRN Constipation    Pertinent Labs: CBC Full  -  ( 10 May 2024 06:41 )  WBC Count : 15.69 K/uL  RBC Count : 4.22 M/uL  Hemoglobin : 12.7 g/dL  Hematocrit : 38.1 %  Platelet Count - Automated : 311 K/uL  Mean Cell Volume : 90.3 fl  Mean Cell Hemoglobin : 30.1 pg  Mean Cell Hemoglobin Concentration : 33.3 gm/dL  05-10 Na128 mmol/L<L> Glu 99 mg/dL K+ 4.0 mmol/L Cr  0.53 mg/dL BUN 28.3 mg/dL<H> Phos n/a   Alb 3.4 g/dL PAB n/a       Skin: IAD    Nutrition focused physical exam conducted - found signs of malnutrition [ ]absent [ x]present    Subcutaneous fat loss: [x ] Orbital fat pads region, [ ]Buccal fat region, [x ]Triceps region,  [ ]Ribs region    Muscle wasting: [ x]Temples region, [ x]Clavicle region, [x ]Shoulder region, [ ]Scapula region, [ ]Interosseous region,  [ ]thigh region, [ ]Calf region    Estimated Needs:   [x ] no change since previous assessment  [ ] recalculated:     Brief Hospital Course: 82 y/o female with PMHx HTN, Breast Ca (dx 2020, surgery, no chemo/radiation) and A Fib (started taking  Eliquis 1 week ago, last dose 4/28 AM) BIBA to [facility]  from home due to headache. Patient had recently been admitted due to HTN after running out of her coreg. CT head found with intraventricular bleed and was transferred to [facility] ED for further evaluation. Pt was initially admitted to NICU team then downgraded to step down under neurology team. She was given Andexxa for Eliquis reversal and BP was controlled with nicardipine for SBP > 200s. Course was complicated by Afib rvr, hyponatremia and uti w/leukocytosis .Pt was started IV Rocephin today. Repeat CTH on 05/03 stable. TTE ef 55-60%,mild to mod TR. Cardiology and nephrology are following Pt will be transfer to medicine team from neurology team.   Of note patient endorses that she had fallen and hit her head a couple of days before initiation of Eliquis but did not communicate to team at that time.  AC on hold for at least 14 days per neuro. Pend MRI  brain pending    Current Nutrition Diagnosis: Pt remains at high nutrition risk secondary to malnutrition (severe, acute) related to inability to meet sufficient protein-energy in setting of poor appetite, IVH/IPH, UTI as evidenced by meeting <50% nutrient needs >5 days, moderate muscle loss of temples, clavicles, shoulders, moderate fat loss of orbitals and triceps.   Pt seen with breakfast at bedside <25% completed despite set up/encouragement, appears pleasantly confused. Pt documented with continued poor PO intake completing <50% of meals with assistance. Last documented BM 5/8, fecal incontinence noted.     Recommendations:   1) Add Ensure HP/Enlive TID   2) Consider appetite stimulant   3) Monitor weights daily for trend/accuracy   4) Provide encouragement/assistance as needed during mealtimes to inc PO     Monitoring and Evaluation:   [x ] PO intake [x ] Tolerance to diet prescription [X] Weights  [X] Follow up per protocol [X] Labs: Source: Patient [x]  Family [ ]   other [ ]    Current Diet:   Diet, Regular:   1000mL Fluid Restriction ([id number]) (05-06-24 @ 10:32)    Patient reports [ ] nausea  [ ] vomiting [ ] diarrhea [ ] constipation  [ ]chewing problems [ ] swallowing issues  [ ] other:     PO intake:  < 50% [x]   50-75%  [ ]   %  [ ]  other :    Source for PO intake [ ] Patient [ ] family [ x] chart [x ] staff [ ] other    Current Weight:   (5/10) 130.3 lbs RD bed scale weight   (4/29) 133.1 lbs     % Weight Change: Possible ~3lb weight loss     Pertinent Medications: MEDICATIONS  (STANDING):  anastrozole 1 milliGRAM(s) Oral daily  cefTRIAXone Injectable. 1000 milliGRAM(s) IV Push once  diltiazem    milliGRAM(s) Oral daily  heparin   Injectable 5000 Unit(s) SubCutaneous every 8 hours  hydrALAZINE 25 milliGRAM(s) Oral three times a day  influenza  Vaccine (HIGH DOSE) 0.7 milliLiter(s) IntraMuscular once  latanoprost 0.005% Ophthalmic Solution 1 Drop(s) Both EYES at bedtime  levothyroxine 75 MICROGram(s) Oral daily  metoprolol succinate  milliGRAM(s) Oral two times a day  pantoprazole    Tablet 40 milliGRAM(s) Oral before breakfast  potassium chloride    Tablet ER 20 milliEquivalent(s) Oral daily  sodium chloride 2 Gram(s) Oral three times a day  urea Oral Powder 15 Gram(s) Oral two times a day  valsartan 160 milliGRAM(s) Oral every 12 hours    MEDICATIONS  (PRN):  acetaminophen     Tablet .. 650 milliGRAM(s) Oral every 6 hours PRN Mild Pain (1 - 3)  hydrALAZINE Injectable 10 milliGRAM(s) IV Push every 2 hours PRN SBP> 150 and DBP> 90  labetalol Injectable 10 milliGRAM(s) IV Push every 2 hours PRN SBP> 150 and DBP> 90  senna 2 Tablet(s) Oral at bedtime PRN Constipation    Pertinent Labs: CBC Full  -  ( 10 May 2024 06:41 )  WBC Count : 15.69 K/uL  RBC Count : 4.22 M/uL  Hemoglobin : 12.7 g/dL  Hematocrit : 38.1 %  Platelet Count - Automated : 311 K/uL  Mean Cell Volume : 90.3 fl  Mean Cell Hemoglobin : 30.1 pg  Mean Cell Hemoglobin Concentration : 33.3 gm/dL  05-10 Na128 mmol/L<L> Glu 99 mg/dL K+ 4.0 mmol/L Cr  0.53 mg/dL BUN 28.3 mg/dL<H> Phos n/a   Alb 3.4 g/dL PAB n/a       Skin: IAD    Nutrition focused physical exam conducted - found signs of malnutrition [ ]absent [ x]present    Subcutaneous fat loss: [x ] Orbital fat pads region, [ ]Buccal fat region, [x ]Triceps region,  [ ]Ribs region    Muscle wasting: [ x]Temples region, [ x]Clavicle region, [x ]Shoulder region, [ ]Scapula region, [ ]Interosseous region,  [ ]thigh region, [ ]Calf region    Estimated Needs:   [x ] no change since previous assessment  [ ] recalculated:     Brief Hospital Course: 84 y/o female with PMHx HTN, Breast Ca (dx 2020, surgery, no chemo/radiation) and A Fib (started taking  Eliquis 1 week ago, last dose 4/28 AM) BIBA to [facility]  from home due to headache. Patient had recently been admitted due to HTN after running out of her coreg. CT head found with intraventricular bleed and was transferred to [facility] ED for further evaluation. Pt was initially admitted to NICU team then downgraded to step down under neurology team. She was given Andexxa for Eliquis reversal and BP was controlled with nicardipine for SBP > 200s. Course was complicated by Afib rvr, hyponatremia and uti w/leukocytosis .Pt was started IV Rocephin today. Repeat CTH on 05/03 stable. TTE ef 55-60%,mild to mod TR. Cardiology and nephrology are following Pt will be transfer to medicine team from neurology team.   Of note patient endorses that she had fallen and hit her head a couple of days before initiation of Eliquis but did not communicate to team at that time.  AC on hold for at least 14 days per neuro. Pend MRI  brain pending    Current Nutrition Diagnosis: Pt remains at high nutrition risk secondary to malnutrition (severe, acute) related to inability to meet sufficient protein-energy in setting of poor appetite, IVH/IPH, UTI as evidenced by meeting <50% nutrient needs >5 days, moderate muscle loss of temples, clavicles, shoulders, moderate fat loss of orbitals and triceps.   Pt seen with breakfast at bedside <25% completed despite set up/encouragement, appears pleasantly confused. Pt documented with continued poor PO intake completing <50% of meals with assistance. Last documented BM 5/8, fecal incontinence noted.     Recommendations:   1) Add Ensure HP/Enlive TID   2) Consider appetite stimulant   3) Rx MVI daily   4) Monitor weights daily for trend/accuracy   5) Provide encouragement/assistance as needed during mealtimes to inc PO     Monitoring and Evaluation:   [x ] PO intake [x ] Tolerance to diet prescription [X] Weights  [X] Follow up per protocol [X] Labs:

## 2024-05-10 NOTE — PROGRESS NOTE ADULT - ASSESSMENT
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

## 2024-05-10 NOTE — PROGRESS NOTE ADULT - ASSESSMENT
82 y/o female with PMHx HTN, Breast Ca (dx 2020, surgery, no chemo/radiation) and A Fib (started taking  Eliquis 1 week ago, last dose 4/28 AM) BIBA to [facility]  from home due to headache. Patient had recently been admitted due to HTN after running out of her coreg. CT head found with intraventricular bleed and was transferred to [facility] ED for further evaluation. Pt was initially admitted to NICU team then downgraded to step down under neurology team. She was given Andexxa for Eliquis reversal and BP was controlled with nicardipine for SBP > 200s. Course was complicated by Afib rvr, hyponatremia and uti w/leukocytosis .Pt was started IV Rocephin today. Repeat CTH on 05/03 stable. TTE ef 55-60%,mild to mod TR. Cardiology and nephrology are following Pt will be transfer to medicine team from neurology team.   Of note patient endorses that she had fallen and hit her head a couple of days before initiation of Eliquis but did not communicate to team at that time.  AC on hold for at least 14 days per neuro. Repeat MRI brain stable. To have repeat CT brain in 4 weeks prior to resumption of AC. Nephrology following for hyponatremia secondary to SIADH  To be discharged to [facility] on Monday.       1-IVH/IPH  Suspect secondary to HTN emergency with underlying medication induced coagulopathy from Eliquis,  S/P recent fall  repeat cth stable   BP controlled   Mr of brain stable bleed extension to ventricle   d/w NS team pt to get repeat Ct of brain  4 weeks after stroke from 4/28 per stroke team and follow up in the office to resume AC       2-Hypertension   s/p urgency and cardene drip   -s/p cardene drip  --SBP goal per neuro is 130-150  -continue hydralazine, metoprolol, valsartan dose increased for BP control     3-Atrial fib RVR   CTA of chest negative for PE   EP input appreciated   metoprolol dose increased ., added cardizem change to long acting   resume AC in few weeks if repeat CT of brain stable and follow up with NS team in the office     4-Hyponatremia  - Na 128 this morning  - Secondary to SIADH   - Continue UreaNA and NaCL tabs  - 1 liter fluid restriction  - Follow up CMP in AM     5-UTI /e coli  complete course of iv rocephin last dose 5/10   trend leukocytosis     6-Sepsis due to UTI   resolved   ID consult appreciated    7-HX Breast cancer   -dx 2020, surgery, no chemo/radiation  -on Anastrozole    8-Hypothyroidism  -c/w synthroid    9- Severe protein lupillo malnutrition   cont diet   ensure with meals   MVI     dvt ppx sq heparin    Auth started for JOLENE. Anticipate discharge Monday if hyponatremia improves

## 2024-05-10 NOTE — PROGRESS NOTE ADULT - SUBJECTIVE AND OBJECTIVE BOX
CC: Follow up     INTERVAL HPI/OVERNIGHT EVENTS: Patient seen and examined, sitting up comfortably. no acute complaints      Vital Signs Last 24 Hrs  T(C): 36.7 (10 May 2024 08:00), Max: 36.9 (10 May 2024 00:23)  T(F): 98.1 (10 May 2024 08:00), Max: 98.4 (10 May 2024 00:23)  HR: 79 (10 May 2024 13:34) (79 - 101)  BP: 122/73 (10 May 2024 13:34) (111/71 - 163/90)  BP(mean): --  RR: 18 (10 May 2024 08:00) (18 - 18)  SpO2: 95% (10 May 2024 08:00) (94% - 97%)    Parameters below as of 09 May 2024 19:48  Patient On (Oxygen Delivery Method): room air        PHYSICAL EXAM:    GENERAL: NAD, well-groomed, well-developed  HEAD:  Atraumatic, Normocephalic  EYES: conjunctiva and sclera clear  ENMT: Moist mucous membranes  NECK: Supple, No JVD  NERVOUS SYSTEM:  Alert & Oriented X3, Motor Strength 5/5 B/L upper and lower extremities  CHEST/LUNG: Clear to auscultation bilaterally; No rales, rhonchi, wheezing, or rubs  HEART: IRR; No murmurs, rubs, or gallops  ABDOMEN: Soft, Nontender, Nondistended; Bowel sounds present  EXTREMITIES:  2+ Peripheral Pulses, No clubbing, cyanosis, or edema        MEDICATIONS  (STANDING):  anastrozole 1 milliGRAM(s) Oral daily  cefTRIAXone Injectable. 1000 milliGRAM(s) IV Push once  diltiazem    milliGRAM(s) Oral daily  heparin   Injectable 5000 Unit(s) SubCutaneous every 8 hours  hydrALAZINE 25 milliGRAM(s) Oral three times a day  influenza  Vaccine (HIGH DOSE) 0.7 milliLiter(s) IntraMuscular once  latanoprost 0.005% Ophthalmic Solution 1 Drop(s) Both EYES at bedtime  levothyroxine 75 MICROGram(s) Oral daily  metoprolol succinate  milliGRAM(s) Oral two times a day  pantoprazole    Tablet 40 milliGRAM(s) Oral before breakfast  potassium chloride    Tablet ER 20 milliEquivalent(s) Oral daily  sodium chloride 2 Gram(s) Oral three times a day  urea Oral Powder 15 Gram(s) Oral two times a day  valsartan 160 milliGRAM(s) Oral every 12 hours    MEDICATIONS  (PRN):  acetaminophen     Tablet .. 650 milliGRAM(s) Oral every 6 hours PRN Mild Pain (1 - 3)  hydrALAZINE Injectable 10 milliGRAM(s) IV Push every 2 hours PRN SBP> 150 and DBP> 90  labetalol Injectable 10 milliGRAM(s) IV Push every 2 hours PRN SBP> 150 and DBP> 90  senna 2 Tablet(s) Oral at bedtime PRN Constipation      Allergies    No Known Allergies    Intolerances          LABS:                          12.7   15.69 )-----------( 311      ( 10 May 2024 06:41 )             38.1     05-10    128<L>  |  93<L>  |  28.3<H>  ----------------------------<  99  4.0   |  21.0<L>  |  0.53    Ca    8.9      10 May 2024 06:41  Mg     1.8     05-10    TPro  5.9<L>  /  Alb  3.4  /  TBili  0.8  /  DBili  x   /  AST  21  /  ALT  20  /  AlkPhos  75  05-10      Urinalysis Basic - ( 10 May 2024 06:41 )    Color: x / Appearance: x / SG: x / pH: x  Gluc: 99 mg/dL / Ketone: x  / Bili: x / Urobili: x   Blood: x / Protein: x / Nitrite: x   Leuk Esterase: x / RBC: x / WBC x   Sq Epi: x / Non Sq Epi: x / Bacteria: x        RADIOLOGY & ADDITIONAL TESTS:

## 2024-05-10 NOTE — CHART NOTE - NSCHARTNOTESELECT_GEN_ALL_CORE
Event Note
Transfer Note
Event Note
Event Note
Nutrition Services
Nutrition Services

## 2024-05-10 NOTE — PROGRESS NOTE ADULT - SUBJECTIVE AND OBJECTIVE BOX
NEPHROLOGY INTERVAL HPI/OVERNIGHT EVENTS:    Feels better   No complaints   Meds noted     MEDICATIONS  (STANDING):  anastrozole 1 milliGRAM(s) Oral daily  cefTRIAXone Injectable. 1000 milliGRAM(s) IV Push once  diltiazem    milliGRAM(s) Oral daily  heparin   Injectable 5000 Unit(s) SubCutaneous every 8 hours  hydrALAZINE 25 milliGRAM(s) Oral three times a day  influenza  Vaccine (HIGH DOSE) 0.7 milliLiter(s) IntraMuscular once  latanoprost 0.005% Ophthalmic Solution 1 Drop(s) Both EYES at bedtime  levothyroxine 75 MICROGram(s) Oral daily  metoprolol succinate  milliGRAM(s) Oral two times a day  pantoprazole    Tablet 40 milliGRAM(s) Oral before breakfast  potassium chloride    Tablet ER 20 milliEquivalent(s) Oral daily  sodium chloride 2 Gram(s) Oral three times a day  urea Oral Powder 15 Gram(s) Oral two times a day  valsartan 160 milliGRAM(s) Oral every 12 hours    MEDICATIONS  (PRN):  acetaminophen     Tablet .. 650 milliGRAM(s) Oral every 6 hours PRN Mild Pain (1 - 3)  hydrALAZINE Injectable 10 milliGRAM(s) IV Push every 2 hours PRN SBP> 150 and DBP> 90  labetalol Injectable 10 milliGRAM(s) IV Push every 2 hours PRN SBP> 150 and DBP> 90  senna 2 Tablet(s) Oral at bedtime PRN Constipation      Allergies    No Known Allergies    Intolerances            Vital Signs Last 24 Hrs  T(C): 36.7 (10 May 2024 08:00), Max: 36.9 (10 May 2024 00:23)  T(F): 98.1 (10 May 2024 08:00), Max: 98.4 (10 May 2024 00:23)  HR: 101 (10 May 2024 08:00) (68 - 101)  BP: 143/83 (10 May 2024 08:00) (111/71 - 163/90)  BP(mean): 82 (09 May 2024 13:17) (82 - 97)  RR: 18 (10 May 2024 08:00) (17 - 18)  SpO2: 95% (10 May 2024 08:00) (94% - 99%)    Parameters below as of 09 May 2024 19:48  Patient On (Oxygen Delivery Method): room air      Daily     Daily   I&O's Detail    09 May 2024 07:01  -  10 May 2024 07:00  --------------------------------------------------------  IN:    Oral Fluid: 460 mL  Total IN: 460 mL    OUT:    Indwelling Catheter - Urethral (mL): 1575 mL  Total OUT: 1575 mL    Total NET: -1115 mL        I&O's Summary    09 May 2024 07:01  -  10 May 2024 07:00  --------------------------------------------------------  IN: 460 mL / OUT: 1575 mL / NET: -1115 mL        PHYSICAL EXAM:      GENERAL: Weak, fatigued  HEENMT: Moist MMs  NECK: No JVD  NERVOUS SYSTEM: Awake. alert  CHEST/LUNG: Clear bilaterally  HEART: No rub  ABDOMEN: Soft +BS  EXTREMITIES: No dependent edema   LABS:                        12.7   15.69 )-----------( 311      ( 10 May 2024 06:41 )             38.1     05-10    128<L>  |  93<L>  |  28.3<H>  ----------------------------<  99  4.0   |  21.0<L>  |  0.53    Ca    8.9      10 May 2024 06:41  Mg     1.8     05-10    Uric Acid: 1.0 mg/dL (24 @ 05:40)   Osmolality, Random Urine: 415 mosm/kg (24 @ 12:30)  Sodium, Random Urine: 121:  Thyroid Stimulating Hormone, Serum: 1.24 uIU/mL       TPro  5.9<L>  /  Alb  3.4  /  TBili  0.8  /  DBili  x   /  AST  21  /  ALT  20  /  AlkPhos  75  05-10      Urinalysis Basic - ( 10 May 2024 06:41 )    Color: x / Appearance: x / SG: x / pH: x  Gluc: 99 mg/dL / Ketone: x  / Bili: x / Urobili: x   Blood: x / Protein: x / Nitrite: x   Leuk Esterase: x / RBC: x / WBC x   Sq Epi: x / Non Sq Epi: x / Bacteria: x      Magnesium: 1.8 mg/dL (05-10 @ 06:41)      < from: CT Head No Cont (24 @ 17:30) >    ACC: [id number] EXAM:  CT BRAIN   ORDERED BY: AMAIRANI LEDESMA     PROCEDURE DATE:  2024          INTERPRETATION:  CLINICAL INDICATIONS:  follow up r/o bleed    COMPARISON: Head CT dated 2024. MRI brain dated 2024    TECHNIQUE: Noncontrast CT of the head. Multiplanar reformations are   submitted.    FINDINGS:    Stable acute left basal ganglia/caudate head hemorrhage extending into   the lateral ventricles. Mildly improved intraventricular hemorrhage in   the occipital horns. Stable mild-to-moderate hydrocephalus. No new   hemorrhage.    Posterior midline scalp partially calcified sebaceous cyst measures 2.9   cm.    There is periventricular and subcortical white matter hypodensity without   mass effect, nonspecific, likely representing mild to moderate chronic   microvascular ischemic changes. There is no compelling evidence for an   acute transcortical infarction.    The orbits, mastoid air cells and visualized paranasal sinuses are   unremarkable. The calvarium is intact. Consider MRI as clinically   warranted.      IMPRESSION: Stable acute left basal ganglia/caudate head hemorrhage   extending into the lateral ventricles. Mildly improved intraventricular   hemorrhage in the occipital horns. Stable mild-to-moderate hydrocephalus.   No new hemorrhage.    --- End of Report ---            ADRIENNE LAWSON MD; Attending Radiologist  This document has been electronically signed. May  6 2024  8:39PM    < end of copied text >      < from: CT Angio Chest PE Protocol w/ IV Cont (24 @ 10:39) >    ACC: [id number] EXAM:  CT ANGIO CHEST PULM ART WAWIC   ORDERED BY: AMAIRANI LEDESMA     PROCEDURE DATE:  2024          INTERPRETATION:  .  Patient: JUMA DOMINGUEZ  : 1940  MRN: [id number]  ACC: [id number]  Order Date: 2024 10:39 AM  Exam: CT ANGIO CHEST PULMONARY ARTERY    INDICATION, CLINICAL INFORMATION: Hyponatremia, tachycardia, atrial   fibrillation, stroke  TECHNIQUE: CT pulmonary angiogram of the chest . Uneventful   administration of 61 cc Omnipaque 350. Coronal, sagittaland 3-D/MIP   images were reconstructed/reviewed.  COMPARISON: No prior chest CT.    FINDINGS:    PULMONARY VESSELS: No pulmonary embolus. Main pulmonary artery normal in   diameter.    HEART AND VASCULATURE: Cardiomegaly. No pericardial effusion. Noaortic   aneurysm.    LUNGS, AIRWAYS, PLEURA: Patent central airways. Mosaic aeration of the   lungs. Small bilateral pleural effusions.    MEDIASTINUM: No mass or lymphadenopathy. Retained ingested material   within the esophagus.    UPPER ABDOMEN: Left perinephric fat stranding.    BONES AND SOFT TISSUES: No aggressive osseous lesion. Breast implants.    LOWER NECK: Within normal limits.    IMPRESSION:    No pulmonary embolus.    Small pleural effusions.    Cardiomegaly.    --- End of Report ---            DASHA MUNIZ MD; Attending Radiologist  This document has been electronically signed. May  8 2024  1:36PM    < end of copied text >  RADIOLOGY & ADDITIONAL TESTS:

## 2024-05-11 LAB
ALBUMIN SERPL ELPH-MCNC: 3.7 G/DL — SIGNIFICANT CHANGE UP (ref 3.3–5.2)
ALP SERPL-CCNC: 84 U/L — SIGNIFICANT CHANGE UP (ref 40–120)
ALT FLD-CCNC: 20 U/L — SIGNIFICANT CHANGE UP
ANION GAP SERPL CALC-SCNC: 12 MMOL/L — SIGNIFICANT CHANGE UP (ref 5–17)
AST SERPL-CCNC: 19 U/L — SIGNIFICANT CHANGE UP
BASOPHILS # BLD AUTO: 0.28 K/UL — HIGH (ref 0–0.2)
BASOPHILS NFR BLD AUTO: 1.8 % — SIGNIFICANT CHANGE UP (ref 0–2)
BILIRUB SERPL-MCNC: 0.8 MG/DL — SIGNIFICANT CHANGE UP (ref 0.4–2)
BUN SERPL-MCNC: 45.5 MG/DL — HIGH (ref 8–20)
CALCIUM SERPL-MCNC: 9.2 MG/DL — SIGNIFICANT CHANGE UP (ref 8.4–10.5)
CHLORIDE SERPL-SCNC: 96 MMOL/L — SIGNIFICANT CHANGE UP (ref 96–108)
CO2 SERPL-SCNC: 22 MMOL/L — SIGNIFICANT CHANGE UP (ref 22–29)
CREAT SERPL-MCNC: 0.51 MG/DL — SIGNIFICANT CHANGE UP (ref 0.5–1.3)
EGFR: 91 ML/MIN/1.73M2 — SIGNIFICANT CHANGE UP
EOSINOPHIL # BLD AUTO: 0.96 K/UL — HIGH (ref 0–0.5)
EOSINOPHIL NFR BLD AUTO: 6.2 % — HIGH (ref 0–6)
GLUCOSE SERPL-MCNC: 104 MG/DL — HIGH (ref 70–99)
HCT VFR BLD CALC: 40.9 % — SIGNIFICANT CHANGE UP (ref 34.5–45)
HGB BLD-MCNC: 13.5 G/DL — SIGNIFICANT CHANGE UP (ref 11.5–15.5)
LYMPHOCYTES # BLD AUTO: 1.52 K/UL — SIGNIFICANT CHANGE UP (ref 1–3.3)
LYMPHOCYTES # BLD AUTO: 9.8 % — LOW (ref 13–44)
MCHC RBC-ENTMCNC: 29.7 PG — SIGNIFICANT CHANGE UP (ref 27–34)
MCHC RBC-ENTMCNC: 33 GM/DL — SIGNIFICANT CHANGE UP (ref 32–36)
MCV RBC AUTO: 90.1 FL — SIGNIFICANT CHANGE UP (ref 80–100)
MONOCYTES # BLD AUTO: 1.52 K/UL — HIGH (ref 0–0.9)
MONOCYTES NFR BLD AUTO: 9.8 % — SIGNIFICANT CHANGE UP (ref 2–14)
NEUTROPHILS # BLD AUTO: 10.54 K/UL — HIGH (ref 1.8–7.4)
NEUTROPHILS NFR BLD AUTO: 67.9 % — SIGNIFICANT CHANGE UP (ref 43–77)
PLATELET # BLD AUTO: 333 K/UL — SIGNIFICANT CHANGE UP (ref 150–400)
POTASSIUM SERPL-MCNC: 4.1 MMOL/L — SIGNIFICANT CHANGE UP (ref 3.5–5.3)
POTASSIUM SERPL-SCNC: 4.1 MMOL/L — SIGNIFICANT CHANGE UP (ref 3.5–5.3)
PROT SERPL-MCNC: 6.5 G/DL — LOW (ref 6.6–8.7)
RBC # BLD: 4.54 M/UL — SIGNIFICANT CHANGE UP (ref 3.8–5.2)
RBC # FLD: 14 % — SIGNIFICANT CHANGE UP (ref 10.3–14.5)
SODIUM SERPL-SCNC: 130 MMOL/L — LOW (ref 135–145)
WBC # BLD: 15.53 K/UL — HIGH (ref 3.8–10.5)
WBC # FLD AUTO: 15.53 K/UL — HIGH (ref 3.8–10.5)

## 2024-05-11 PROCEDURE — 99232 SBSQ HOSP IP/OBS MODERATE 35: CPT

## 2024-05-11 RX ADMIN — Medication 75 MICROGRAM(S): at 06:21

## 2024-05-11 RX ADMIN — VALSARTAN 160 MILLIGRAM(S): 80 TABLET ORAL at 06:21

## 2024-05-11 RX ADMIN — Medication 150 MILLIGRAM(S): at 06:21

## 2024-05-11 RX ADMIN — Medication 25 MILLIGRAM(S): at 06:21

## 2024-05-11 RX ADMIN — CEFTRIAXONE 1000 MILLIGRAM(S): 500 INJECTION, POWDER, FOR SOLUTION INTRAMUSCULAR; INTRAVENOUS at 06:22

## 2024-05-11 RX ADMIN — Medication 15 GRAM(S): at 06:20

## 2024-05-11 RX ADMIN — PANTOPRAZOLE SODIUM 40 MILLIGRAM(S): 20 TABLET, DELAYED RELEASE ORAL at 06:21

## 2024-05-11 RX ADMIN — Medication 25 MILLIGRAM(S): at 21:11

## 2024-05-11 RX ADMIN — SODIUM CHLORIDE 2 GRAM(S): 9 INJECTION INTRAMUSCULAR; INTRAVENOUS; SUBCUTANEOUS at 06:21

## 2024-05-11 RX ADMIN — HEPARIN SODIUM 5000 UNIT(S): 5000 INJECTION INTRAVENOUS; SUBCUTANEOUS at 21:11

## 2024-05-11 RX ADMIN — SODIUM CHLORIDE 2 GRAM(S): 9 INJECTION INTRAMUSCULAR; INTRAVENOUS; SUBCUTANEOUS at 13:11

## 2024-05-11 RX ADMIN — HEPARIN SODIUM 5000 UNIT(S): 5000 INJECTION INTRAVENOUS; SUBCUTANEOUS at 13:11

## 2024-05-11 RX ADMIN — Medication 15 GRAM(S): at 18:13

## 2024-05-11 RX ADMIN — Medication 20 MILLIEQUIVALENT(S): at 13:11

## 2024-05-11 RX ADMIN — Medication 150 MILLIGRAM(S): at 18:14

## 2024-05-11 RX ADMIN — VALSARTAN 160 MILLIGRAM(S): 80 TABLET ORAL at 18:14

## 2024-05-11 RX ADMIN — SODIUM CHLORIDE 2 GRAM(S): 9 INJECTION INTRAMUSCULAR; INTRAVENOUS; SUBCUTANEOUS at 21:11

## 2024-05-11 RX ADMIN — ANASTROZOLE 1 MILLIGRAM(S): 1 TABLET ORAL at 13:10

## 2024-05-11 RX ADMIN — Medication 25 MILLIGRAM(S): at 13:29

## 2024-05-11 RX ADMIN — HEPARIN SODIUM 5000 UNIT(S): 5000 INJECTION INTRAVENOUS; SUBCUTANEOUS at 06:20

## 2024-05-11 RX ADMIN — LATANOPROST 1 DROP(S): 0.05 SOLUTION/ DROPS OPHTHALMIC; TOPICAL at 21:15

## 2024-05-11 RX ADMIN — Medication 180 MILLIGRAM(S): at 06:21

## 2024-05-11 NOTE — PROGRESS NOTE ADULT - SUBJECTIVE AND OBJECTIVE BOX
SUBJECTIVE / OVERNIGHT EVENTS: Seen and examined. Feels ok. Denies chest pain, SOB, abd pain, N/V, fever, chills, dysuria or any other complaints.     MEDICATIONS  (STANDING):  anastrozole 1 milliGRAM(s) Oral daily  diltiazem   milliGRAM(s) Oral daily  heparin Injectable 5000 Unit(s) SubCutaneous every 8 hours  hydrALAZINE 25 milliGRAM(s) Oral three times a day  influenza  Vaccine (HIGH DOSE) 0.7 milliLiter(s) IntraMuscular once  latanoprost 0.005% Ophthalmic Solution 1 Drop(s) Both EYES at bedtime  levothyroxine 75 MICROGram(s) Oral daily  metoprolol succinate  milliGRAM(s) Oral two times a day  pantoprazole    Tablet 40 milliGRAM(s) Oral before breakfast  potassium chloride    Tablet ER 20 milliEquivalent(s) Oral daily  sodium chloride 2 Gram(s) Oral three times a day  urea Oral Powder 15 Gram(s) Oral two times a day  valsartan 160 milliGRAM(s) Oral every 12 hours    MEDICATIONS  (PRN):  acetaminophen     Tablet .. 650 milliGRAM(s) Oral every 6 hours PRN Mild Pain (1 - 3)  hydrALAZINE Injectable 10 milliGRAM(s) IV Push every 2 hours PRN SBP> 150 and DBP> 90  labetalol Injectable 10 milliGRAM(s) IV Push every 2 hours PRN SBP> 150 and DBP> 90  senna 2 Tablet(s) Oral at bedtime PRN Constipation        I&O's Summary    10 May 2024 07:01  -  11 May 2024 07:00  --------------------------------------------------------  IN: 720 mL / OUT: 1700 mL / NET: -980 mL    11 May 2024 07:01  -  11 May 2024 12:54  --------------------------------------------------------  IN: 0 mL / OUT: 700 mL / NET: -700 mL        PHYSICAL EXAM:  Vital Signs Last 24 Hrs  T(C): 36.7 (11 May 2024 11:22), Max: 37.4 (10 May 2024 17:13)  T(F): 98 (11 May 2024 11:22), Max: 99.4 (10 May 2024 17:13)  HR: 91 (11 May 2024 11:22) (79 - 104)  BP: 138/76 (11 May 2024 11:22) (117/78 - 150/91)  BP(mean): --  RR: 18 (11 May 2024 11:22) (18 - 18)  SpO2: 95% (11 May 2024 11:22) (94% - 100%)    Parameters below as of 11 May 2024 11:22  Patient On (Oxygen Delivery Method): room air        PHYSICAL EXAM:    GENERAL: NAD  HEAD:  Atraumatic, Normocephalic  EYES: conjunctiva and sclera clear  ENMT: Moist mucous membranes  NECK: Supple, No JVD  NERVOUS SYSTEM:  Alert & Oriented X3, Motor Strength 5/5 B/L upper and lower extremities  CHEST/LUNG: Clear to auscultation bilaterally; No rales, rhonchi, wheezing, or rubs  HEART: IRR; No murmurs, rubs, or gallops  ABDOMEN: Soft, Nontender, Nondistended; Bowel sounds present  EXTREMITIES:  2+ Peripheral Pulses, No clubbing, cyanosis, or edema    LABS:                        13.5   15.53 )-----------( 333      ( 11 May 2024 07:07 )             40.9     05-11    130<L>  |  96  |  45.5<H>  ----------------------------<  104<H>  4.1   |  22.0  |  0.51    Ca    9.2      11 May 2024 07:07  Mg     1.8     05-10    TPro  6.5<L>  /  Alb  3.7  /  TBili  0.8  /  DBili  x   /  AST  19  /  ALT  20  /  AlkPhos  84  05-11          Urinalysis Basic - ( 11 May 2024 07:07 )    Color: x / Appearance: x / SG: x / pH: x  Gluc: 104 mg/dL / Ketone: x  / Bili: x / Urobili: x   Blood: x / Protein: x / Nitrite: x   Leuk Esterase: x / RBC: x / WBC x   Sq Epi: x / Non Sq Epi: x / Bacteria: x        CAPILLARY BLOOD GLUCOSE            RADIOLOGY & ADDITIONAL TESTS:  Results Reviewed:   Imaging Personally Reviewed:  Electrocardiogram Personally Reviewed:

## 2024-05-11 NOTE — PROGRESS NOTE ADULT - SUBJECTIVE AND OBJECTIVE BOX
NEPHROLOGY INTERVAL HPI/OVERNIGHT EVENTS:    Meds the same   No new events   Seen earlier     MEDICATIONS  (STANDING):  anastrozole 1 milliGRAM(s) Oral daily  diltiazem    milliGRAM(s) Oral daily  heparin   Injectable 5000 Unit(s) SubCutaneous every 8 hours  hydrALAZINE 25 milliGRAM(s) Oral three times a day  influenza  Vaccine (HIGH DOSE) 0.7 milliLiter(s) IntraMuscular once  latanoprost 0.005% Ophthalmic Solution 1 Drop(s) Both EYES at bedtime  levothyroxine 75 MICROGram(s) Oral daily  metoprolol succinate  milliGRAM(s) Oral two times a day  pantoprazole    Tablet 40 milliGRAM(s) Oral before breakfast  potassium chloride    Tablet ER 20 milliEquivalent(s) Oral daily  sodium chloride 2 Gram(s) Oral three times a day  urea Oral Powder 15 Gram(s) Oral two times a day  valsartan 160 milliGRAM(s) Oral every 12 hours    MEDICATIONS  (PRN):  acetaminophen     Tablet .. 650 milliGRAM(s) Oral every 6 hours PRN Mild Pain (1 - 3)  hydrALAZINE Injectable 10 milliGRAM(s) IV Push every 2 hours PRN SBP> 150 and DBP> 90  labetalol Injectable 10 milliGRAM(s) IV Push every 2 hours PRN SBP> 150 and DBP> 90  senna 2 Tablet(s) Oral at bedtime PRN Constipation      Allergies    No Known Allergies    Intolerances            Vital Signs Last 24 Hrs  T(C): 36.7 (11 May 2024 17:38), Max: 36.7 (11 May 2024 04:58)  T(F): 98.1 (11 May 2024 17:38), Max: 98.1 (11 May 2024 04:58)  HR: 86 (11 May 2024 18:07) (83 - 104)  BP: 142/88 (11 May 2024 18:07) (117/78 - 150/91)  BP(mean): --  RR: 18 (11 May 2024 17:38) (18 - 18)  SpO2: 97% (11 May 2024 17:38) (94% - 98%)    Parameters below as of 11 May 2024 11:22  Patient On (Oxygen Delivery Method): room air      Daily     Daily   I&O's Detail    10 May 2024 07:01  -  11 May 2024 07:00  --------------------------------------------------------  IN:    Oral Fluid: 720 mL  Total IN: 720 mL    OUT:    Indwelling Catheter - Urethral (mL): 400 mL    Intermittent Catheterization - Urethral (mL): 1300 mL  Total OUT: 1700 mL    Total NET: -980 mL      11 May 2024 07:01  -  11 May 2024 18:47  --------------------------------------------------------  IN:  Total IN: 0 mL    OUT:    Intermittent Catheterization - Urethral (mL): 700 mL  Total OUT: 700 mL    Total NET: -700 mL        I&O's Summary    10 May 2024 07:01  -  11 May 2024 07:00  --------------------------------------------------------  IN: 720 mL / OUT: 1700 mL / NET: -980 mL    11 May 2024 07:01  -  11 May 2024 18:47  --------------------------------------------------------  IN: 0 mL / OUT: 700 mL / NET: -700 mL        PHYSICAL EXAM:        GENERAL: Weak, fatigued  HEENMT: Moist MMs  NECK: No JVD  NERVOUS SYSTEM: Awake. alert  CHEST/LUNG: Clear bilaterally  HEART: No rub  ABDOMEN: Soft +BS  EXTREMITIES: No dependent edema   LABS:                        13.5   15.53 )-----------( 333      ( 11 May 2024 07:07 )             40.9     05-11    130<L>  |  96  |  45.5<H>  ----------------------------<  104<H>  4.1   |  22.0  |  0.51    Ca    9.2      11 May 2024 07:07  Mg     1.8     05-10    TPro  6.5<L>  /  Alb  3.7  /  TBili  0.8  /  DBili  x   /  AST  19  /  ALT  20  /  AlkPhos  84  05-11      Urinalysis Basic - ( 11 May 2024 07:07 )    Color: x / Appearance: x / SG: x / pH: x  Gluc: 104 mg/dL / Ketone: x  / Bili: x / Urobili: x   Blood: x / Protein: x / Nitrite: x   Leuk Esterase: x / RBC: x / WBC x   Sq Epi: x / Non Sq Epi: x / Bacteria: x              RADIOLOGY & ADDITIONAL TESTS:

## 2024-05-11 NOTE — PROGRESS NOTE ADULT - ASSESSMENT
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

## 2024-05-11 NOTE — PROGRESS NOTE ADULT - ASSESSMENT
84 y/o female with PMHx HTN, Breast Ca (dx 2020, surgery, no chemo/radiation) and A Fib (started taking  Eliquis 1 week ago, last dose 4/28 AM) BIBA to [facility]  from home due to headache. Patient had recently been admitted due to HTN after running out of her coreg. CT head found with intraventricular bleed and was transferred to [facility] ED for further evaluation. Pt was initially admitted to NICU team then downgraded to step down under neurology team. She was given Andexxa for Eliquis reversal and BP was controlled with nicardipine for SBP > 200s. Course was complicated by Afib rvr, hyponatremia and uti w/leukocytosis .Pt was started IV Rocephin today. Repeat CTH on 05/03 stable. TTE ef 55-60%,mild to mod TR. Cardiology and nephrology are following Pt will be transfer to medicine team from neurology team.   Of note patient endorses that she had fallen and hit her head a couple of days before initiation of Eliquis but did not communicate to team at that time.  AC on hold for at least 14 days per neuro. Repeat MRI brain stable. To have repeat CT brain in 4 weeks prior to resumption of AC. Nephrology following for hyponatremia secondary to SIADH, Improving.  To be discharged to [facility] [place] on Monday.       1-IVH/IPH  Suspect secondary to HTN emergency with underlying medication induced coagulopathy from Eliquis,  S/P recent fall  repeat cth stable   BP controlled   Mr of brain stable bleed extension to ventricle   D/w NS team pt to get repeat Ct of brain 4 weeks after stroke from 4/28 per stroke team and follow up in the office to resume ac     2-Hypertension   s/p urgency and cardene drip   -s/p cardene drip  --SBP goal per neuro is 130-150  -continue hydralazine, metoprolol, valsartan dose increased for BP control     3-Atrial fib RVR   CTA of chest negative for PE   EP input appreciated   metoprolol dose increased, added cardizem change to long acting   resume AC in few weeks if repeat CT of brain stable and follow up with NS team in the office     4-Hyponatremia  - Na 128-->130 this morning  - Secondary to SIADH   - Continue UreaNA and NaCL tabs  - 1 liter fluid restriction  - Follow up CMP in AM     5-UTI /e coli  complete course of iv rocephin last dose 5/10   trend leukocytosis     6-Sepsis due to UTI   resolved   ID consult appreciated    7-HX Breast cancer   -dx 2020, surgery, no chemo/radiation  -on Anastrozole    8-Hypothyroidism  -c/w synthroid    9- Severe protein lupillo malnutrition   cont diet   ensure with meals   MVI     dvt ppx sq heparin    Auth started for JOLENE. Anticipate discharge Monday if hyponatremia improves

## 2024-05-12 LAB
ANION GAP SERPL CALC-SCNC: 10 MMOL/L — SIGNIFICANT CHANGE UP (ref 5–17)
BUN SERPL-MCNC: 51.5 MG/DL — HIGH (ref 8–20)
CALCIUM SERPL-MCNC: 9.1 MG/DL — SIGNIFICANT CHANGE UP (ref 8.4–10.5)
CHLORIDE SERPL-SCNC: 95 MMOL/L — LOW (ref 96–108)
CO2 SERPL-SCNC: 26 MMOL/L — SIGNIFICANT CHANGE UP (ref 22–29)
CREAT SERPL-MCNC: 0.59 MG/DL — SIGNIFICANT CHANGE UP (ref 0.5–1.3)
EGFR: 88 ML/MIN/1.73M2 — SIGNIFICANT CHANGE UP
GLUCOSE SERPL-MCNC: 115 MG/DL — HIGH (ref 70–99)
HCT VFR BLD CALC: 39.1 % — SIGNIFICANT CHANGE UP (ref 34.5–45)
HGB BLD-MCNC: 13 G/DL — SIGNIFICANT CHANGE UP (ref 11.5–15.5)
MCHC RBC-ENTMCNC: 29.6 PG — SIGNIFICANT CHANGE UP (ref 27–34)
MCHC RBC-ENTMCNC: 33.2 GM/DL — SIGNIFICANT CHANGE UP (ref 32–36)
MCV RBC AUTO: 89.1 FL — SIGNIFICANT CHANGE UP (ref 80–100)
PLATELET # BLD AUTO: 333 K/UL — SIGNIFICANT CHANGE UP (ref 150–400)
POTASSIUM SERPL-MCNC: 4.1 MMOL/L — SIGNIFICANT CHANGE UP (ref 3.5–5.3)
POTASSIUM SERPL-SCNC: 4.1 MMOL/L — SIGNIFICANT CHANGE UP (ref 3.5–5.3)
RAPID RVP RESULT: SIGNIFICANT CHANGE UP
RBC # BLD: 4.39 M/UL — SIGNIFICANT CHANGE UP (ref 3.8–5.2)
RBC # FLD: 14 % — SIGNIFICANT CHANGE UP (ref 10.3–14.5)
SARS-COV-2 RNA SPEC QL NAA+PROBE: SIGNIFICANT CHANGE UP
SODIUM SERPL-SCNC: 131 MMOL/L — LOW (ref 135–145)
WBC # BLD: 11.48 K/UL — HIGH (ref 3.8–10.5)
WBC # FLD AUTO: 11.48 K/UL — HIGH (ref 3.8–10.5)

## 2024-05-12 PROCEDURE — 99232 SBSQ HOSP IP/OBS MODERATE 35: CPT

## 2024-05-12 RX ORDER — TAMSULOSIN HYDROCHLORIDE 0.4 MG/1
0.4 CAPSULE ORAL AT BEDTIME
Refills: 0 | Status: DISCONTINUED | OUTPATIENT
Start: 2024-05-12 | End: 2024-05-12

## 2024-05-12 RX ORDER — TAMSULOSIN HYDROCHLORIDE 0.4 MG/1
0.4 CAPSULE ORAL AT BEDTIME
Refills: 0 | Status: DISCONTINUED | OUTPATIENT
Start: 2024-05-12 | End: 2024-05-13

## 2024-05-12 RX ADMIN — HEPARIN SODIUM 5000 UNIT(S): 5000 INJECTION INTRAVENOUS; SUBCUTANEOUS at 05:43

## 2024-05-12 RX ADMIN — Medication 25 MILLIGRAM(S): at 05:43

## 2024-05-12 RX ADMIN — Medication 150 MILLIGRAM(S): at 17:32

## 2024-05-12 RX ADMIN — SODIUM CHLORIDE 2 GRAM(S): 9 INJECTION INTRAMUSCULAR; INTRAVENOUS; SUBCUTANEOUS at 05:44

## 2024-05-12 RX ADMIN — Medication 25 MILLIGRAM(S): at 21:00

## 2024-05-12 RX ADMIN — SODIUM CHLORIDE 2 GRAM(S): 9 INJECTION INTRAMUSCULAR; INTRAVENOUS; SUBCUTANEOUS at 21:00

## 2024-05-12 RX ADMIN — LATANOPROST 1 DROP(S): 0.05 SOLUTION/ DROPS OPHTHALMIC; TOPICAL at 21:00

## 2024-05-12 RX ADMIN — Medication 180 MILLIGRAM(S): at 05:43

## 2024-05-12 RX ADMIN — HEPARIN SODIUM 5000 UNIT(S): 5000 INJECTION INTRAVENOUS; SUBCUTANEOUS at 21:00

## 2024-05-12 RX ADMIN — Medication 15 GRAM(S): at 05:44

## 2024-05-12 RX ADMIN — TAMSULOSIN HYDROCHLORIDE 0.4 MILLIGRAM(S): 0.4 CAPSULE ORAL at 21:00

## 2024-05-12 RX ADMIN — ANASTROZOLE 1 MILLIGRAM(S): 1 TABLET ORAL at 13:14

## 2024-05-12 RX ADMIN — VALSARTAN 160 MILLIGRAM(S): 80 TABLET ORAL at 05:43

## 2024-05-12 RX ADMIN — Medication 150 MILLIGRAM(S): at 05:43

## 2024-05-12 RX ADMIN — Medication 100 MILLIGRAM(S): at 03:39

## 2024-05-12 RX ADMIN — Medication 75 MICROGRAM(S): at 05:43

## 2024-05-12 RX ADMIN — PANTOPRAZOLE SODIUM 40 MILLIGRAM(S): 20 TABLET, DELAYED RELEASE ORAL at 05:44

## 2024-05-12 RX ADMIN — HEPARIN SODIUM 5000 UNIT(S): 5000 INJECTION INTRAVENOUS; SUBCUTANEOUS at 13:13

## 2024-05-12 RX ADMIN — Medication 25 MILLIGRAM(S): at 13:13

## 2024-05-12 RX ADMIN — Medication 20 MILLIEQUIVALENT(S): at 13:13

## 2024-05-12 RX ADMIN — Medication 15 GRAM(S): at 17:31

## 2024-05-12 RX ADMIN — VALSARTAN 160 MILLIGRAM(S): 80 TABLET ORAL at 17:32

## 2024-05-12 RX ADMIN — SODIUM CHLORIDE 2 GRAM(S): 9 INJECTION INTRAMUSCULAR; INTRAVENOUS; SUBCUTANEOUS at 13:13

## 2024-05-12 NOTE — PROGRESS NOTE ADULT - ASSESSMENT
84 y/o female with PMHx HTN, Breast Ca (dx 2020, surgery, no chemo/radiation) and A Fib (started taking  Eliquis 1 week ago, last dose 4/28 AM) BIBA to [facility]  from home due to headache. Patient had recently been admitted due to HTN after running out of her coreg. CT head found with intraventricular bleed and was transferred to [facility] ED for further evaluation. Pt was initially admitted to NICU team then downgraded to step down under neurology team. She was given Andexxa for Eliquis reversal and BP was controlled with nicardipine for SBP > 200s. Course was complicated by Afib rvr, hyponatremia and uti w/leukocytosis .Pt was started IV Rocephin today. Repeat CTH on 05/03 stable. TTE ef 55-60%,mild to mod TR. Cardiology and nephrology are following Pt will be transfer to medicine team from neurology team.   Of note patient endorses that she had fallen and hit her head a couple of days before initiation of Eliquis but did not communicate to team at that time.  AC on hold for at least 14 days per neuro. Repeat MRI brain stable. To have repeat CT brain in 4 weeks prior to resumption of AC. Nephrology following for hyponatremia secondary to SIADH which improved.  To be discharged to [facility] on Monday.       1-IVH/IPH  Suspect secondary to HTN emergency with underlying medication induced coagulopathy from Eliquis,  S/P recent fall  repeat cth stable   BP controlled   Mr of brain stable bleed extension to ventricle   D/w NS team pt to get repeat Ct of brain 4 weeks after stroke from 4/28 per stroke team and follow up in the office to resume ac     2-Hypertension   s/p urgency and cardene drip   -s/p cardene drip  --SBP goal per neuro is 130-150  -continue hydralazine, metoprolol, valsartan dose increased for BP control     3-Atrial fib RVR   CTA of chest negative for PE   EP input appreciated   metoprolol dose increased, added cardizem change to long acting   resume AC in few weeks if repeat CT of brain stable and follow up with NS team in the office     4-Hyponatremia  - Na 128-->131 this morning  - Secondary to SIADH   - Continue UreaNA and NaCL tabs  - 1 liter fluid restriction  - Follow up CMP in AM     5-UTI /e coli  complete course of iv rocephin last dose 5/10   trend leukocytosis     6-Sepsis due to UTI   resolved   ID consult appreciated    7-HX Breast cancer   -dx 2020, surgery, no chemo/radiation  -on Anastrozole    8-Hypothyroidism  -c/w synthroid    9- Severe protein lupillo malnutrition   cont diet   ensure with meals   MVI     dvt ppx sq heparin    Auth started for JOLENE. Anticipate discharge Monday if hyponatremia improves

## 2024-05-12 NOTE — PROGRESS NOTE ADULT - SUBJECTIVE AND OBJECTIVE BOX
NEPHROLOGY INTERVAL HPI/OVERNIGHT EVENTS:    Feels better   No complaints   Meds noted   No SOB or CP     MEDICATIONS  (STANDING):  anastrozole 1 milliGRAM(s) Oral daily  diltiazem    milliGRAM(s) Oral daily  heparin   Injectable 5000 Unit(s) SubCutaneous every 8 hours  hydrALAZINE 25 milliGRAM(s) Oral three times a day  influenza  Vaccine (HIGH DOSE) 0.7 milliLiter(s) IntraMuscular once  latanoprost 0.005% Ophthalmic Solution 1 Drop(s) Both EYES at bedtime  levothyroxine 75 MICROGram(s) Oral daily  metoprolol succinate  milliGRAM(s) Oral two times a day  pantoprazole    Tablet 40 milliGRAM(s) Oral before breakfast  potassium chloride    Tablet ER 20 milliEquivalent(s) Oral daily  sodium chloride 2 Gram(s) Oral three times a day  tamsulosin 0.4 milliGRAM(s) Oral at bedtime  urea Oral Powder 15 Gram(s) Oral two times a day  valsartan 160 milliGRAM(s) Oral every 12 hours    MEDICATIONS  (PRN):  acetaminophen     Tablet .. 650 milliGRAM(s) Oral every 6 hours PRN Mild Pain (1 - 3)  hydrALAZINE Injectable 10 milliGRAM(s) IV Push every 2 hours PRN SBP> 150 and DBP> 90  labetalol Injectable 10 milliGRAM(s) IV Push every 2 hours PRN SBP> 150 and DBP> 90  senna 2 Tablet(s) Oral at bedtime PRN Constipation      Allergies    No Known Allergies    Intolerances        Vital Signs Last 24 Hrs  T(C): 37.2 (12 May 2024 17:12), Max: 37.2 (12 May 2024 17:12)  T(F): 99 (12 May 2024 17:12), Max: 99 (12 May 2024 17:12)  HR: 93 (12 May 2024 17:12) (71 - 101)  BP: 144/79 (12 May 2024 17:12) (117/73 - 156/71)  BP(mean): --  RR: 18 (12 May 2024 17:12) (17 - 18)  SpO2: 97% (12 May 2024 17:12) (93% - 98%)    Parameters below as of 12 May 2024 11:01  Patient On (Oxygen Delivery Method): room air      Daily     Daily   I&O's Detail    11 May 2024 07:01  -  12 May 2024 07:00  --------------------------------------------------------  IN:  Total IN: 0 mL    OUT:    Intermittent Catheterization - Urethral (mL): 700 mL  Total OUT: 700 mL    Total NET: -700 mL        I&O's Summary    11 May 2024 07:01  -  12 May 2024 07:00  --------------------------------------------------------  IN: 0 mL / OUT: 700 mL / NET: -700 mL        PHYSICAL EXAM:      GENERAL: Weak, fatigued  HEENMT: Moist MMs  NECK: No JVD  NERVOUS SYSTEM: Awake. alert  CHEST/LUNG: Clear bilaterally  HEART: No rub  ABDOMEN: Soft +BS  EXTREMITIES: No dependent edema                      13.0   11.48 )-----------( 333      ( 12 May 2024 06:42 )             39.1     05-12    131<L>  |  95<L>  |  51.5<H>  ----------------------------<  115<H>  4.1   |  26.0  |  0.59    Ca    9.1      12 May 2024 06:42    TPro  6.5<L>  /  Alb  3.7  /  TBili  0.8  /  DBili  x   /  AST  19  /  ALT  20  /  AlkPhos  84  05-11      Urinalysis Basic - ( 12 May 2024 06:42 )    Color: x / Appearance: x / SG: x / pH: x  Gluc: 115 mg/dL / Ketone: x  / Bili: x / Urobili: x   Blood: x / Protein: x / Nitrite: x   Leuk Esterase: x / RBC: x / WBC x   Sq Epi: x / Non Sq Epi: x / Bacteria: x              RADIOLOGY & ADDITIONAL TESTS:

## 2024-05-12 NOTE — PROGRESS NOTE ADULT - SUBJECTIVE AND OBJECTIVE BOX
SUBJECTIVE / OVERNIGHT EVENTS: Seen and examined. Had to have no placed due to multiple episodes of urinary retention and was straight cathed. Patient denies chest pain, SOB, abd pain, N/V, fever, chills, dysuria or any other complaints. All remainder ROS negative.     MEDICATIONS  (STANDING):  anastrozole 1 milliGRAM(s) Oral daily  diltiazem   milliGRAM(s) Oral daily  heparin   Injectable 5000 Unit(s) SubCutaneous every 8 hours  hydrALAZINE 25 milliGRAM(s) Oral three times a day  influenza  Vaccine (HIGH DOSE) 0.7 milliLiter(s) IntraMuscular once  latanoprost 0.005% Ophthalmic Solution 1 Drop(s) Both EYES at bedtime  levothyroxine 75 MICROGram(s) Oral daily  metoprolol succinate  milliGRAM(s) Oral two times a day  pantoprazole    Tablet 40 milliGRAM(s) Oral before breakfast  potassium chloride    Tablet ER 20 milliEquivalent(s) Oral daily  sodium chloride 2 Gram(s) Oral three times a day  urea Oral Powder 15 Gram(s) Oral two times a day  valsartan 160 milliGRAM(s) Oral every 12 hours    MEDICATIONS  (PRN):  acetaminophen     Tablet .. 650 milliGRAM(s) Oral every 6 hours PRN Mild Pain (1 - 3)  hydrALAZINE Injectable 10 milliGRAM(s) IV Push every 2 hours PRN SBP> 150 and DBP> 90  labetalol Injectable 10 milliGRAM(s) IV Push every 2 hours PRN SBP> 150 and DBP> 90  senna 2 Tablet(s) Oral at bedtime PRN Constipation        I&O's Summary    11 May 2024 07:01  -  12 May 2024 07:00  --------------------------------------------------------  IN: 0 mL / OUT: 700 mL / NET: -700 mL        PHYSICAL EXAM:  Vital Signs Last 24 Hrs  T(C): 36.5 (12 May 2024 11:01), Max: 36.7 (11 May 2024 11:22)  T(F): 97.7 (12 May 2024 11:01), Max: 98.1 (11 May 2024 17:38)  HR: 71 (12 May 2024 11:01) (71 - 101)  BP: 117/73 (12 May 2024 11:01) (117/73 - 156/71)  BP(mean): --  RR: 18 (12 May 2024 11:01) (17 - 18)  SpO2: 98% (12 May 2024 11:01) (93% - 98%)    Parameters below as of 12 May 2024 11:01  Patient On (Oxygen Delivery Method): room air        PHYSICAL EXAM:    GENERAL: NAD  HEAD:  Atraumatic, Normocephalic  EYES: conjunctiva and sclera clear  ENMT: Moist mucous membranes  NECK: Supple, No JVD  NERVOUS SYSTEM:  Alert & Oriented X3, Motor Strength 5/5 B/L upper and lower extremities  CHEST/LUNG: Clear to auscultation bilaterally; No rales, rhonchi, wheezing, or rubs  HEART: IRR; No murmurs, rubs, or gallops  ABDOMEN: Soft, Nontender, Nondistended; Bowel sounds present  EXTREMITIES:  2+ Peripheral Pulses, No clubbing, cyanosis, or edema    LABS:                        13.0   11.48 )-----------( 333      ( 12 May 2024 06:42 )             39.1     05-12    131<L>  |  95<L>  |  51.5<H>  ----------------------------<  115<H>  4.1   |  26.0  |  0.59    Ca    9.1      12 May 2024 06:42    TPro  6.5<L>  /  Alb  3.7  /  TBili  0.8  /  DBili  x   /  AST  19  /  ALT  20  /  AlkPhos  84  05-11          Urinalysis Basic - ( 12 May 2024 06:42 )    Color: x / Appearance: x / SG: x / pH: x  Gluc: 115 mg/dL / Ketone: x  / Bili: x / Urobili: x   Blood: x / Protein: x / Nitrite: x   Leuk Esterase: x / RBC: x / WBC x   Sq Epi: x / Non Sq Epi: x / Bacteria: x        CAPILLARY BLOOD GLUCOSE            RADIOLOGY & ADDITIONAL TESTS:  Results Reviewed:   Imaging Personally Reviewed:  Electrocardiogram Personally Reviewed:

## 2024-05-13 ENCOUNTER — TRANSCRIPTION ENCOUNTER (OUTPATIENT)
Age: 84
End: 2024-05-13

## 2024-05-13 VITALS
DIASTOLIC BLOOD PRESSURE: 61 MMHG | RESPIRATION RATE: 17 BRPM | SYSTOLIC BLOOD PRESSURE: 100 MMHG | HEART RATE: 80 BPM | TEMPERATURE: 98 F | OXYGEN SATURATION: 97 %

## 2024-05-13 LAB
ANION GAP SERPL CALC-SCNC: 9 MMOL/L — SIGNIFICANT CHANGE UP (ref 5–17)
BUN SERPL-MCNC: 35.6 MG/DL — HIGH (ref 8–20)
CALCIUM SERPL-MCNC: 9 MG/DL — SIGNIFICANT CHANGE UP (ref 8.4–10.5)
CHLORIDE SERPL-SCNC: 99 MMOL/L — SIGNIFICANT CHANGE UP (ref 96–108)
CO2 SERPL-SCNC: 24 MMOL/L — SIGNIFICANT CHANGE UP (ref 22–29)
CREAT SERPL-MCNC: 0.55 MG/DL — SIGNIFICANT CHANGE UP (ref 0.5–1.3)
EGFR: 90 ML/MIN/1.73M2 — SIGNIFICANT CHANGE UP
GLUCOSE SERPL-MCNC: 108 MG/DL — HIGH (ref 70–99)
HCT VFR BLD CALC: 39.8 % — SIGNIFICANT CHANGE UP (ref 34.5–45)
HGB BLD-MCNC: 13.2 G/DL — SIGNIFICANT CHANGE UP (ref 11.5–15.5)
MCHC RBC-ENTMCNC: 29.7 PG — SIGNIFICANT CHANGE UP (ref 27–34)
MCHC RBC-ENTMCNC: 33.2 GM/DL — SIGNIFICANT CHANGE UP (ref 32–36)
MCV RBC AUTO: 89.6 FL — SIGNIFICANT CHANGE UP (ref 80–100)
PLATELET # BLD AUTO: 296 K/UL — SIGNIFICANT CHANGE UP (ref 150–400)
POTASSIUM SERPL-MCNC: 4.6 MMOL/L — SIGNIFICANT CHANGE UP (ref 3.5–5.3)
POTASSIUM SERPL-SCNC: 4.6 MMOL/L — SIGNIFICANT CHANGE UP (ref 3.5–5.3)
RBC # BLD: 4.44 M/UL — SIGNIFICANT CHANGE UP (ref 3.8–5.2)
RBC # FLD: 14.2 % — SIGNIFICANT CHANGE UP (ref 10.3–14.5)
SODIUM SERPL-SCNC: 132 MMOL/L — LOW (ref 135–145)
WBC # BLD: 8.36 K/UL — SIGNIFICANT CHANGE UP (ref 3.8–10.5)
WBC # FLD AUTO: 8.36 K/UL — SIGNIFICANT CHANGE UP (ref 3.8–10.5)

## 2024-05-13 PROCEDURE — 84443 ASSAY THYROID STIM HORMONE: CPT

## 2024-05-13 PROCEDURE — 70553 MRI BRAIN STEM W/O & W/DYE: CPT | Mod: MC

## 2024-05-13 PROCEDURE — 80053 COMPREHEN METABOLIC PANEL: CPT

## 2024-05-13 PROCEDURE — 87186 SC STD MICRODIL/AGAR DIL: CPT

## 2024-05-13 PROCEDURE — 82962 GLUCOSE BLOOD TEST: CPT

## 2024-05-13 PROCEDURE — 83880 ASSAY OF NATRIURETIC PEPTIDE: CPT

## 2024-05-13 PROCEDURE — 86850 RBC ANTIBODY SCREEN: CPT

## 2024-05-13 PROCEDURE — 93321 DOPPLER ECHO F-UP/LMTD STD: CPT

## 2024-05-13 PROCEDURE — 85730 THROMBOPLASTIN TIME PARTIAL: CPT

## 2024-05-13 PROCEDURE — 93970 EXTREMITY STUDY: CPT

## 2024-05-13 PROCEDURE — 84133 ASSAY OF URINE POTASSIUM: CPT

## 2024-05-13 PROCEDURE — 82607 VITAMIN B-12: CPT

## 2024-05-13 PROCEDURE — 82436 ASSAY OF URINE CHLORIDE: CPT

## 2024-05-13 PROCEDURE — 83735 ASSAY OF MAGNESIUM: CPT

## 2024-05-13 PROCEDURE — 81001 URINALYSIS AUTO W/SCOPE: CPT

## 2024-05-13 PROCEDURE — 99239 HOSP IP/OBS DSCHRG MGMT >30: CPT

## 2024-05-13 PROCEDURE — 86901 BLOOD TYPING SEROLOGIC RH(D): CPT

## 2024-05-13 PROCEDURE — 93308 TTE F-UP OR LMTD: CPT

## 2024-05-13 PROCEDURE — 87640 STAPH A DNA AMP PROBE: CPT

## 2024-05-13 PROCEDURE — 36415 COLL VENOUS BLD VENIPUNCTURE: CPT

## 2024-05-13 PROCEDURE — 85027 COMPLETE CBC AUTOMATED: CPT

## 2024-05-13 PROCEDURE — 70450 CT HEAD/BRAIN W/O DYE: CPT | Mod: MC

## 2024-05-13 PROCEDURE — 87040 BLOOD CULTURE FOR BACTERIA: CPT

## 2024-05-13 PROCEDURE — 93325 DOPPLER ECHO COLOR FLOW MAPG: CPT

## 2024-05-13 PROCEDURE — 71275 CT ANGIOGRAPHY CHEST: CPT | Mod: MC

## 2024-05-13 PROCEDURE — 0225U NFCT DS DNA&RNA 21 SARSCOV2: CPT

## 2024-05-13 PROCEDURE — 82570 ASSAY OF URINE CREATININE: CPT

## 2024-05-13 PROCEDURE — 83935 ASSAY OF URINE OSMOLALITY: CPT

## 2024-05-13 PROCEDURE — 84550 ASSAY OF BLOOD/URIC ACID: CPT

## 2024-05-13 PROCEDURE — 83605 ASSAY OF LACTIC ACID: CPT

## 2024-05-13 PROCEDURE — 83835 ASSAY OF METANEPHRINES: CPT

## 2024-05-13 PROCEDURE — 97530 THERAPEUTIC ACTIVITIES: CPT

## 2024-05-13 PROCEDURE — 93005 ELECTROCARDIOGRAM TRACING: CPT

## 2024-05-13 PROCEDURE — 87086 URINE CULTURE/COLONY COUNT: CPT

## 2024-05-13 PROCEDURE — 87641 MR-STAPH DNA AMP PROBE: CPT

## 2024-05-13 PROCEDURE — 83930 ASSAY OF BLOOD OSMOLALITY: CPT

## 2024-05-13 PROCEDURE — 80048 BASIC METABOLIC PNL TOTAL CA: CPT

## 2024-05-13 PROCEDURE — 84100 ASSAY OF PHOSPHORUS: CPT

## 2024-05-13 PROCEDURE — 85610 PROTHROMBIN TIME: CPT

## 2024-05-13 PROCEDURE — 71045 X-RAY EXAM CHEST 1 VIEW: CPT

## 2024-05-13 PROCEDURE — 80061 LIPID PANEL: CPT

## 2024-05-13 PROCEDURE — 83036 HEMOGLOBIN GLYCOSYLATED A1C: CPT

## 2024-05-13 PROCEDURE — 84300 ASSAY OF URINE SODIUM: CPT

## 2024-05-13 PROCEDURE — 86900 BLOOD TYPING SEROLOGIC ABO: CPT

## 2024-05-13 PROCEDURE — 85025 COMPLETE CBC W/AUTO DIFF WBC: CPT

## 2024-05-13 RX ORDER — HYDRALAZINE HCL 50 MG
1 TABLET ORAL
Qty: 0 | Refills: 0 | DISCHARGE
Start: 2024-05-13

## 2024-05-13 RX ORDER — POTASSIUM CHLORIDE 20 MEQ
1 PACKET (EA) ORAL
Qty: 0 | Refills: 0 | DISCHARGE
Start: 2024-05-13

## 2024-05-13 RX ORDER — DILTIAZEM HCL 120 MG
1 CAPSULE, EXT RELEASE 24 HR ORAL
Qty: 0 | Refills: 0 | DISCHARGE
Start: 2024-05-13

## 2024-05-13 RX ORDER — TAMSULOSIN HYDROCHLORIDE 0.4 MG/1
1 CAPSULE ORAL
Qty: 0 | Refills: 0 | DISCHARGE
Start: 2024-05-13

## 2024-05-13 RX ORDER — APIXABAN 2.5 MG/1
0 TABLET, FILM COATED ORAL
Refills: 0 | DISCHARGE

## 2024-05-13 RX ORDER — SODIUM CHLORIDE 9 MG/ML
2 INJECTION INTRAMUSCULAR; INTRAVENOUS; SUBCUTANEOUS
Qty: 0 | Refills: 0 | DISCHARGE
Start: 2024-05-13

## 2024-05-13 RX ORDER — SENNA PLUS 8.6 MG/1
2 TABLET ORAL
Qty: 0 | Refills: 0 | DISCHARGE
Start: 2024-05-13

## 2024-05-13 RX ORDER — UREA 15 G
1 POWDER IN PACKET (EA) ORAL
Qty: 0 | Refills: 0 | DISCHARGE
Start: 2024-05-13

## 2024-05-13 RX ORDER — METOPROLOL TARTRATE 50 MG
3 TABLET ORAL
Qty: 0 | Refills: 0 | DISCHARGE
Start: 2024-05-13

## 2024-05-13 RX ADMIN — Medication 15 GRAM(S): at 05:38

## 2024-05-13 RX ADMIN — HEPARIN SODIUM 5000 UNIT(S): 5000 INJECTION INTRAVENOUS; SUBCUTANEOUS at 05:38

## 2024-05-13 RX ADMIN — PANTOPRAZOLE SODIUM 40 MILLIGRAM(S): 20 TABLET, DELAYED RELEASE ORAL at 06:50

## 2024-05-13 RX ADMIN — ANASTROZOLE 1 MILLIGRAM(S): 1 TABLET ORAL at 13:13

## 2024-05-13 RX ADMIN — SODIUM CHLORIDE 2 GRAM(S): 9 INJECTION INTRAMUSCULAR; INTRAVENOUS; SUBCUTANEOUS at 05:37

## 2024-05-13 RX ADMIN — HEPARIN SODIUM 5000 UNIT(S): 5000 INJECTION INTRAVENOUS; SUBCUTANEOUS at 13:13

## 2024-05-13 RX ADMIN — Medication 180 MILLIGRAM(S): at 05:37

## 2024-05-13 RX ADMIN — Medication 150 MILLIGRAM(S): at 05:37

## 2024-05-13 RX ADMIN — Medication 20 MILLIEQUIVALENT(S): at 13:13

## 2024-05-13 RX ADMIN — SODIUM CHLORIDE 2 GRAM(S): 9 INJECTION INTRAMUSCULAR; INTRAVENOUS; SUBCUTANEOUS at 13:13

## 2024-05-13 RX ADMIN — Medication 75 MICROGRAM(S): at 05:37

## 2024-05-13 RX ADMIN — Medication 25 MILLIGRAM(S): at 05:37

## 2024-05-13 RX ADMIN — VALSARTAN 160 MILLIGRAM(S): 80 TABLET ORAL at 05:37

## 2024-05-13 NOTE — DISCHARGE NOTE PROVIDER - PROVIDER TOKENS
PROVIDER:[TOKEN:[[id number]]],PROVIDER:[TOKEN:[[id number]]],PROVIDER:[TOKEN:[[id number]]]

## 2024-05-13 NOTE — DISCHARGE NOTE PROVIDER - NSDCCPCAREPLAN_GEN_ALL_CORE_FT
PRINCIPAL DISCHARGE DIAGNOSIS  Diagnosis: Intraventricular hemorrhage  Assessment and Plan of Treatment: S/p recent falls. Repeat CTH stable. BP controlled. MR brain stable. D/w NS. Repeat CTH around last week of May 2024.     PRINCIPAL DISCHARGE DIAGNOSIS  Diagnosis: Intraventricular hemorrhage  Assessment and Plan of Treatment: S/p recent falls. Repeat CTH stable. BP controlled. MR brain stable. D/w NS. Repeat CTH in last week of May 2024. Follow up with neurosurgery outpatient.      SECONDARY DISCHARGE DIAGNOSES  Diagnosis: Hypertensive urgency  Assessment and Plan of Treatment: Controlled with medications. Follow up with PCP outpatient.    Diagnosis: Paroxysmal atrial fibrillation  Assessment and Plan of Treatment: Treated with BB and CCB. Follow up with EP outpatient. Resume AC after clearance by neurosurgery in office.    Diagnosis: Hyponatremia  Assessment and Plan of Treatment: Treated with ureaNA and salt tabs. Secondary to SIADH.    Diagnosis: Acute UTI  Assessment and Plan of Treatment: Treated with abx. follow up pcp outpatient.

## 2024-05-13 NOTE — DISCHARGE NOTE PROVIDER - CARE PROVIDER_API CALL
(1) body pink, extremities blue
Bladimir Grayson  Nephrology  [address] [place] [address]  Phone: [phone]  Fax: [phone]  Follow Up Time:     Bull Abbott  Cardiac Electrophysiology  [address] [place] [address]  Phone: [phone]  Fax: [phone]  Follow Up Time:     Rich Nair  Internal Medicine  Follow Up Time:

## 2024-05-13 NOTE — DISCHARGE NOTE PROVIDER - HOSPITAL COURSE
82 y/o female with PMHx HTN, Breast Ca (dx 2020, surgery, no chemo/radiation) and A Fib (started taking  Eliquis 1 week ago, last dose 4/28 AM) BIBA to [facility]  from home due to headache. Patient had recently been admitted due to HTN after running out of her coreg. CT head found with intraventricular bleed and was transferred to [facility] ED for further evaluation. Pt was initially admitted to NICU team then downgraded to step down under neurology team. She was given Andexxa for Eliquis reversal and BP was controlled with nicardipine for SBP > 200s. Course was complicated by Afib rvr, hyponatremia and uti w/leukocytosis .Pt was started IV Rocephin today. Repeat CTH on 05/03 stable. TTE ef 55-60%,mild to mod TR. Cardiology and nephrology are following Pt will be transfer to medicine team from neurology team. Of note patient endorses that she had fallen and hit her head a couple of days before initiation of Eliquis but did not communicate to team at that time.  AC on hold for at least 14 days per neuro. Repeat MRI brain stable. To have repeat CT brain in 4 weeks prior to resumption of AC. Nephrology following for hyponatremia secondary to SIADH which improved. Stable for discharged to [facility].

## 2024-05-13 NOTE — DISCHARGE NOTE PROVIDER - CARE PROVIDERS DIRECT ADDRESSES
,DirectAddress_Unknown,[email or web address]

## 2024-05-13 NOTE — PROGRESS NOTE ADULT - REASON FOR ADMISSION
IVH

## 2024-05-13 NOTE — PROGRESS NOTE ADULT - ASSESSMENT
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

## 2024-05-13 NOTE — PROGRESS NOTE ADULT - SUBJECTIVE AND OBJECTIVE BOX
NEPHROLOGY INTERVAL HPI/OVERNIGHT EVENTS:    No events   Meds w/o change     MEDICATIONS  (STANDING):  anastrozole 1 milliGRAM(s) Oral daily  diltiazem    milliGRAM(s) Oral daily  heparin   Injectable 5000 Unit(s) SubCutaneous every 8 hours  hydrALAZINE 25 milliGRAM(s) Oral three times a day  influenza  Vaccine (HIGH DOSE) 0.7 milliLiter(s) IntraMuscular once  latanoprost 0.005% Ophthalmic Solution 1 Drop(s) Both EYES at bedtime  levothyroxine 75 MICROGram(s) Oral daily  metoprolol succinate  milliGRAM(s) Oral two times a day  pantoprazole    Tablet 40 milliGRAM(s) Oral before breakfast  potassium chloride    Tablet ER 20 milliEquivalent(s) Oral daily  sodium chloride 2 Gram(s) Oral three times a day  tamsulosin 0.4 milliGRAM(s) Oral at bedtime  urea Oral Powder 15 Gram(s) Oral two times a day  valsartan 160 milliGRAM(s) Oral every 12 hours    MEDICATIONS  (PRN):  acetaminophen     Tablet .. 650 milliGRAM(s) Oral every 6 hours PRN Mild Pain (1 - 3)  hydrALAZINE Injectable 10 milliGRAM(s) IV Push every 2 hours PRN SBP> 150 and DBP> 90  labetalol Injectable 10 milliGRAM(s) IV Push every 2 hours PRN SBP> 150 and DBP> 90  senna 2 Tablet(s) Oral at bedtime PRN Constipation      Allergies    No Known Allergies    Intolerances    Vital Signs Last 24 Hrs  T(C): 36.7 (13 May 2024 12:18), Max: 37.2 (12 May 2024 17:12)  T(F): 98.1 (13 May 2024 12:18), Max: 99 (12 May 2024 17:12)  HR: 83 (13 May 2024 12:18) (80 - 100)  BP: 106/61 (13 May 2024 12:18) (106/61 - 144/79)  BP(mean): --  RR: 18 (13 May 2024 12:18) (18 - 18)  SpO2: 96% (13 May 2024 12:18) (92% - 97%)    Parameters below as of 13 May 2024 04:53  Patient On (Oxygen Delivery Method): room air      Daily     Daily   I&O's Detail    12 May 2024 07:01  -  13 May 2024 07:00  --------------------------------------------------------  IN:  Total IN: 0 mL    OUT:    Indwelling Catheter - Urethral (mL): 600 mL  Total OUT: 600 mL    Total NET: -600 mL        I&O's Summary    12 May 2024 07:01  -  13 May 2024 07:00  --------------------------------------------------------  IN: 0 mL / OUT: 600 mL / NET: -600 mL        PHYSICAL EXAM:          GENERAL: Weak, fatigued  HEENMT: Moist MMs  NECK: No JVD  NERVOUS SYSTEM: Awake. alert  CHEST/LUNG: Clear bilaterally  HEART: No rub  ABDOMEN: Soft +BS  EXTREMITIES: No edema     LABS:                        13.2   8.36  )-----------( 296      ( 13 May 2024 05:41 )             39.8     05-13    132<L>  |  99  |  35.6<H>  ----------------------------<  108<H>  4.6   |  24.0  |  0.55    Ca    9.0      13 May 2024 05:41        Urinalysis Basic - ( 13 May 2024 05:41 )    Color: x / Appearance: x / SG: x / pH: x  Gluc: 108 mg/dL / Ketone: x  / Bili: x / Urobili: x   Blood: x / Protein: x / Nitrite: x   Leuk Esterase: x / RBC: x / WBC x   Sq Epi: x / Non Sq Epi: x / Bacteria: x              RADIOLOGY & ADDITIONAL TESTS:

## 2024-05-13 NOTE — PROGRESS NOTE ADULT - PROVIDER SPECIALTY LIST ADULT
Cardiology
Nephrology
Neurology
Neurology
Neurosurgery
Cardiology
Hospitalist
NSICU
Nephrology
Neurology
Neurology
Neurosurgery
Electrophysiology
Hospitalist
Infectious Disease
Nephrology
Neurosurgery
Hospitalist
Nephrology
Neurology
Neurology
Hospitalist
Nephrology
Nephrology
Cardiology

## 2024-05-13 NOTE — DISCHARGE NOTE PROVIDER - ATTENDING DISCHARGE PHYSICAL EXAMINATION:
GENERAL: NAD  HEAD:  Atraumatic, Normocephalic  EYES: conjunctiva and sclera clear  ENMT: Moist mucous membranes  NECK: Supple, No JVD  NERVOUS SYSTEM:  Alert & Oriented X3, Motor Strength 5/5 B/L upper and lower extremities  CHEST/LUNG: Clear to auscultation bilaterally; No rales, rhonchi, wheezing, or rubs  HEART: IRR; No murmurs, rubs, or gallops  ABDOMEN: Soft, Nontender, Nondistended; Bowel sounds present  EXTREMITIES:  2+ Peripheral Pulses, No clubbing, cyanosis, or edema

## 2024-05-13 NOTE — DISCHARGE NOTE PROVIDER - NSDCMRMEDTOKEN_GEN_ALL_CORE_FT
anastrozole 1 mg oral tablet: 1 tab(s) orally once a day  carvedilol 80 mg oral capsule, extended release: 1 cap(s) orally once a day  eliquis:   irbesartan 150 mg oral tablet: 1 tab(s) orally 2 times a day  latanoprost 0.005% ophthalmic solution: 1 drop(s) in each eye 2 times a day  pravastatin 40 mg oral tablet: 1 tab(s) orally once a day  Synthroid 75 mcg (0.075 mg) oral tablet: 1 tab(s) orally once a day (at bedtime)   anastrozole 1 mg oral tablet: 1 tab(s) orally once a day  dilTIAZem 180 mg/24 hours oral capsule, extended release: 1 cap(s) orally once a day  hydrALAZINE 25 mg oral tablet: 1 tab(s) orally 3 times a day  irbesartan 150 mg oral tablet: 1 tab(s) orally 2 times a day  latanoprost 0.005% ophthalmic solution: 1 drop(s) in each eye 2 times a day  metoprolol succinate 50 mg oral tablet, extended release: 3 tab(s) orally 2 times a day  potassium chloride 20 mEq oral tablet, extended release: 1 tab(s) orally once a day  pravastatin 40 mg oral tablet: 1 tab(s) orally once a day  senna leaf extract oral tablet: 2 tab(s) orally once a day (at bedtime) As needed Constipation  sodium chloride 1 g oral tablet: 2 tab(s) orally 3 times a day  Synthroid 75 mcg (0.075 mg) oral tablet: 1 tab(s) orally once a day (at bedtime)  tamsulosin 0.4 mg oral capsule: 1 cap(s) orally once a day (at bedtime)  urea 15 g oral powder for reconstitution: 1 packet(s) orally 2 times a day

## 2024-05-13 NOTE — PROGRESS NOTE ADULT - NUTRITIONAL ASSESSMENT
This patient has been assessed with a concern for Malnutrition and has been determined to have a diagnosis/diagnoses of Severe protein-calorie malnutrition.    This patient is being managed with:   Diet Regular-  1000mL Fluid Restriction ([id number])  Entered: May  6 2024 10:32AM  
This patient has been assessed with a concern for Malnutrition and has been determined to have a diagnosis/diagnoses of Severe protein-calorie malnutrition.    This patient is being managed with:   Diet Regular-  1000mL Fluid Restriction ([id number])  Entered: May  6 2024 10:32AM  
This patient has been assessed with a concern for Malnutrition and has been determined to have a diagnosis/diagnoses of Severe protein-calorie malnutrition.    This patient is being managed with:   Diet Regular-  1000mL Fluid Restriction ([id number])  Entered: May  6 2024 10:32AM  
This patient has been assessed with a concern for Malnutrition and has been determined to have a diagnosis/diagnoses of Severe protein-calorie malnutrition.    This patient is being managed with:   Diet Regular-  1000mL Fluid Restriction ([id number])  Entered: May  6 2024 10:32AM  
This patient has been assessed with a concern for Malnutrition and has been determined to have a diagnosis/diagnoses of Severe protein-calorie malnutrition.    This patient is being managed with:   Diet Regular-  1000mL Fluid Restriction ([id number])  Entered: May  6 2024 10:32AM  
This patient has been assessed with a concern for Malnutrition and has been determined to have a diagnosis/diagnoses of Severe protein-calorie malnutrition.    This patient is being managed with:   Diet Regular-  1000mL Fluid Restriction ([id number])  Entered: May  6 2024 10:32AM  
This patient has been assessed with a concern for Malnutrition and has been determined to have a diagnosis/diagnoses of Severe protein-calorie malnutrition.    This patient is being managed with:   Diet Regular-  1000mL Fluid Restriction ([id number])  Entered: May  6 2024 10:32AM  
This patient has been assessed with a concern for Malnutrition and has been determined to have a diagnosis/diagnoses of Severe protein-calorie malnutrition.    This patient is being managed with:   Diet Regular-  1000mL Fluid Restriction ([id number])  Entered: May  6 2024 10:32AM  
This patient has been assessed with a concern for Malnutrition and has been determined to have a diagnosis/diagnoses of Severe protein-calorie malnutrition.    This patient is being managed with:   Diet Regular-  1000mL Fluid Restriction ([id number])  Entered: May  6 2024 10:32AM  
This patient has been assessed with a concern for Malnutrition and has been determined to have a diagnosis/diagnoses of Severe protein-calorie malnutrition.    This patient is being managed with:   Diet Regular-  1000mL Fluid Restriction ([id number])  Entered: May  6 2024 10:32AM  
This patient has been assessed with a concern for Malnutrition and has been determined to have a diagnosis/diagnoses of Severe protein-calorie malnutrition.    This patient is being managed with:   Diet Regular-  1000mL Fluid Restriction ([id number])  Entered: May  6 2024 10:32AM  
This patient has been assessed with a concern for Malnutrition and has been determined to have a diagnosis/diagnoses of Severe protein-calorie malnutrition.    This patient is being managed with:   Diet Regular-  1000mL Fluid Restriction ([id number])  Entered: May  6 2024 10:32AM  
This patient has been assessed with a concern for Malnutrition and has been determined to have a diagnosis/diagnoses of Severe protein-calorie malnutrition.    This patient is being managed with:   Diet Regular-  1000mL Fluid Restriction ([id number])  Entered: May  6 2024 10:32AM

## 2024-05-13 NOTE — DISCHARGE NOTE PROVIDER - NSDCFUSCHEDAPPT_GEN_ALL_CORE_FT
[facility]  [facility] [address] [place] [address]  Scheduled Appointment: 06/06/2024

## 2024-05-14 ENCOUNTER — OFFICE (OUTPATIENT)
Dept: URBAN - METROPOLITAN AREA CLINIC 109 | Facility: CLINIC | Age: 84
Setting detail: OPHTHALMOLOGY
End: 2024-05-14
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
## 2024-05-14 PROCEDURE — NO SHOW FE NO SHOW FEE: Performed by: OPHTHALMOLOGY

## 2024-05-14 NOTE — DISCHARGE NOTE PROVIDER - DISCHARGE SERVICE FOR PATIENT
on the discharge service for the patient. I have reviewed and made amendments to the documentation where necessary. pt has ability to asc/desc 5 steps with rail and supervision

## 2024-05-15 LAB
CREATININE, RNDM UR: 14 MG/DL — SIGNIFICANT CHANGE UP
METANEPHS 24H UR-MCNC: 1.2 — HIGH (ref 0–1)
METANEPHS 24H UR-MCNC: 54 UG/L — SIGNIFICANT CHANGE UP
NORMETANEPHRINE.: 101 UG/L — SIGNIFICANT CHANGE UP

## 2024-05-20 NOTE — CDI QUERY NOTE - NSCDIOTHERTXTBX_GEN_ALL_CORE_HH
Please clarify the likely etiology of IVH since there is conflicting documentation on chart.    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  Patient has a hx of noncompliance to meds.      5/1 cardio- Of note patient endorses that she had fallen and hit her head a couple of days before initiation of Eliquis but did not communicate to team at that time.     D/C note- PRINCIPAL DISCHARGE DIAGNOSIS  Diagnosis: Intraventricular hemorrhage  Assessment and Plan of Treatment: S/p recent falls.  SECONDARY DISCHARGE DIAGNOSES  Diagnosis: Hypertensive urgency

## 2024-06-06 ENCOUNTER — APPOINTMENT (OUTPATIENT)
Dept: ORTHOPEDIC SURGERY | Facility: CLINIC | Age: 84
End: 2024-06-06

## 2024-09-10 NOTE — PHYSICAL THERAPY INITIAL EVALUATION ADULT - MANUAL MUSCLE TESTING RESULTS, REHAB EVAL
Stable, patient compliant with Lipitor 20 mg daily.  Will continue to monitor.  
Stable, patient currently taking vitamin D 50,000 units biweekly.  Will continue to monitor.  
right LE grossly 4/5, left LE grossly 5/5

## 2024-11-17 NOTE — PHYSICAL THERAPY INITIAL EVALUATION ADULT - ADDITIONAL COMMENTS
Pt reports living in private home alone, reports her brother lives out of state is willing & able to come stay with pt if needed as well as daughter in law available. Pt has 4-5 DAVID with no handrail and 7 stairs inside with handrail. Independent prior to admission with no AD. Pt owns cane, RW, shower chair and commode.
74